# Patient Record
Sex: FEMALE | Race: WHITE | Employment: OTHER | URBAN - METROPOLITAN AREA
[De-identification: names, ages, dates, MRNs, and addresses within clinical notes are randomized per-mention and may not be internally consistent; named-entity substitution may affect disease eponyms.]

---

## 2017-01-25 ENCOUNTER — HOSPITAL ENCOUNTER (OUTPATIENT)
Dept: PREADMISSION TESTING | Age: 72
Discharge: HOME | End: 2017-01-25
Payer: SELF-PAY

## 2017-01-25 VITALS
OXYGEN SATURATION: 100 % | SYSTOLIC BLOOD PRESSURE: 153 MMHG | HEIGHT: 62 IN | TEMPERATURE: 97.6 F | BODY MASS INDEX: 24.66 KG/M2 | DIASTOLIC BLOOD PRESSURE: 83 MMHG | WEIGHT: 134 LBS

## 2017-01-25 LAB
ANION GAP BLD CALC-SCNC: 8 MMOL/L (ref 5–15)
BASOPHILS # BLD AUTO: 0 K/UL (ref 0–0.1)
BASOPHILS # BLD: 0 % (ref 0–1)
BUN SERPL-MCNC: 13 MG/DL (ref 6–20)
BUN/CREAT SERPL: 21 (ref 12–20)
CALCIUM SERPL-MCNC: 8.6 MG/DL (ref 8.5–10.1)
CHLORIDE SERPL-SCNC: 109 MMOL/L (ref 97–108)
CO2 SERPL-SCNC: 27 MMOL/L (ref 21–32)
CREAT SERPL-MCNC: 0.63 MG/DL (ref 0.55–1.02)
EOSINOPHIL # BLD: 0.1 K/UL (ref 0–0.4)
EOSINOPHIL NFR BLD: 2 % (ref 0–7)
ERYTHROCYTE [DISTWIDTH] IN BLOOD BY AUTOMATED COUNT: 12.7 % (ref 11.5–14.5)
GLUCOSE SERPL-MCNC: 83 MG/DL (ref 65–100)
HCT VFR BLD AUTO: 41.1 % (ref 35–47)
HGB BLD-MCNC: 13.7 G/DL (ref 11.5–16)
LYMPHOCYTES # BLD AUTO: 29 % (ref 12–49)
LYMPHOCYTES # BLD: 1.3 K/UL (ref 0.8–3.5)
MCH RBC QN AUTO: 31.1 PG (ref 26–34)
MCHC RBC AUTO-ENTMCNC: 33.3 G/DL (ref 30–36.5)
MCV RBC AUTO: 93.2 FL (ref 80–99)
MONOCYTES # BLD: 0.4 K/UL (ref 0–1)
MONOCYTES NFR BLD AUTO: 9 % (ref 5–13)
NEUTS SEG # BLD: 2.7 K/UL (ref 1.8–8)
NEUTS SEG NFR BLD AUTO: 60 % (ref 32–75)
PLATELET # BLD AUTO: 171 K/UL (ref 150–400)
POTASSIUM SERPL-SCNC: 4 MMOL/L (ref 3.5–5.1)
RBC # BLD AUTO: 4.41 M/UL (ref 3.8–5.2)
SODIUM SERPL-SCNC: 144 MMOL/L (ref 136–145)
WBC # BLD AUTO: 4.5 K/UL (ref 3.6–11)

## 2017-01-25 PROCEDURE — 36415 COLL VENOUS BLD VENIPUNCTURE: CPT | Performed by: SURGERY

## 2017-01-25 PROCEDURE — 80048 BASIC METABOLIC PNL TOTAL CA: CPT | Performed by: SURGERY

## 2017-01-25 PROCEDURE — 85025 COMPLETE CBC W/AUTO DIFF WBC: CPT | Performed by: SURGERY

## 2017-01-25 RX ORDER — BENZOCAINE .13; .15; .5; 2 G/100G; G/100G; G/100G; G/100G
1 GEL ORAL DAILY
COMMUNITY
End: 2020-06-09

## 2017-01-25 RX ORDER — PANTOPRAZOLE SODIUM 40 MG/1
40 TABLET, DELAYED RELEASE ORAL
COMMUNITY
End: 2017-06-21

## 2017-01-26 ENCOUNTER — ANESTHESIA EVENT (OUTPATIENT)
Dept: MEDSURG UNIT | Age: 72
End: 2017-01-26
Payer: SELF-PAY

## 2017-01-26 NOTE — H&P
Katja Garcia  : 1945  DOS: 2017  Chief Complaint: Consultation       Allergies: codeine, morphine, penicillin       Medications: Micardis, VESIcare       Medical History: Height: 5' 2\", Weight: 138 lbs    Pulmonary System: Negative  Cardiac System: Hypertension  Blood and Liver Systems: Negative  Neurologic and Endocrine Systems: Negative  GI,  and Reproductive Systems: Negative  Cancer: Negative     Surgical History: Face Lift       Family History: High Blood Pressure , Cancer       Social History: Smoking Status: Unknown if ever smoked            Mrs. Mckayla Landis is a 70-year-old female who is a previous cosmetic patient with non-surgical facial rejuvenation and injectable fillers, who also sees our Deonte Kim. She presents today for a cosmetic consultation with a desire to undergo surgical facial rejuvenation to address changes that have occurred to her face over time. She feels as though she looks more tired and aged, and is interested in a surgical correction to provide a more youthful refreshed appearance. She does have a history of a primary facelift over 20 years ago. Examination demonstrates faintly noticeable well-healed scars in the temporal hair, pre-auricular, and post-auricular and mastoid incision areas. She does not demonstrate any marked facial asymmetries, irregularities, or facial nerve dysfunction. I had a lengthy discussion with Cristal Peter about a facelift. Cristal Peter understands this is performed under a general anesthetic on an outpatient basis. I diagrammed on paper and patients face where the pre-temporal hairline, preauricular, postauricular, mastoid, and submental incisions are made.  We discussed subcutaneous dissection, permanent Mersilene sutures placed in the SMAS fascia to plicate and tighten the tissue layer, redraping the skin in a superior-lateral vector, trimming and excising the excess skin away, and closure of the wounds in layers with sutures and/or staples. A suction drain is left in place for couple of days and a compression garment is used with activity restrictions while healing which can extend beyond 4 weeks. There is no exercise for 4 weeks. The risks and complications include but are not limited to infection, pain, bleeding, hematoma's requiring re-operation, skin sensitivity changes, vascular compromise to tissues resulting in partial or complete loss of tissues, resultant open wounds, the need for long term wound care and dressing changes and scarring, irregularities and asymmetries requiring touch-up and revision surgery, an unacceptable cosmetic result, and other things including cardiac and pulmonary risks that include death. Additional risks include facial nerve injury which can be temporary or permanent, partial or complete, and require additional surgery for correction. Her questions were answered and pictures were taken. She will meet with Gadiel Moore to discuss the fees for a conservative subcutaneous secondary face lift that includes pre-temporal hairline, pre-auricular, post-auricular, mastoid, and submental incisions. She may also benefit from a limited amount of autologous fat grafting as an adjunct procedure with her facelift.

## 2017-01-27 ENCOUNTER — ANESTHESIA (OUTPATIENT)
Dept: MEDSURG UNIT | Age: 72
End: 2017-01-27
Payer: SELF-PAY

## 2017-01-27 ENCOUNTER — HOSPITAL ENCOUNTER (OUTPATIENT)
Age: 72
Setting detail: OUTPATIENT SURGERY
Discharge: HOME | End: 2017-01-27
Attending: SURGERY | Admitting: SURGERY
Payer: SELF-PAY

## 2017-01-27 VITALS
HEART RATE: 69 BPM | BODY MASS INDEX: 26.27 KG/M2 | SYSTOLIC BLOOD PRESSURE: 124 MMHG | WEIGHT: 133.82 LBS | RESPIRATION RATE: 11 BRPM | HEIGHT: 60 IN | OXYGEN SATURATION: 92 % | DIASTOLIC BLOOD PRESSURE: 66 MMHG | TEMPERATURE: 98.1 F

## 2017-01-27 PROBLEM — Z41.1 ENCOUNTER FOR COSMETIC SURGERY: Status: ACTIVE | Noted: 2017-01-27

## 2017-01-27 PROCEDURE — 74011000250 HC RX REV CODE- 250: Performed by: SURGERY

## 2017-01-27 PROCEDURE — 76210000047 HC AMBSU PH I REC 4 TO 4.5 HR: Performed by: SURGERY

## 2017-01-27 PROCEDURE — 77030011640 HC PAD GRND REM COVD -A: Performed by: SURGERY

## 2017-01-27 PROCEDURE — 77030008684 HC TU ET CUF COVD -B: Performed by: ANESTHESIOLOGY

## 2017-01-27 PROCEDURE — 77030018846 HC SOL IRR STRL H20 ICUM -A: Performed by: SURGERY

## 2017-01-27 PROCEDURE — 77030013079 HC BLNKT BAIR HGGR 3M -A: Performed by: ANESTHESIOLOGY

## 2017-01-27 PROCEDURE — 74011250636 HC RX REV CODE- 250/636: Performed by: ANESTHESIOLOGY

## 2017-01-27 PROCEDURE — 77030008467 HC STPLR SKN COVD -B: Performed by: SURGERY

## 2017-01-27 PROCEDURE — 77030012406 HC DRN WND PENRS BARD -A: Performed by: SURGERY

## 2017-01-27 PROCEDURE — 74011250636 HC RX REV CODE- 250/636

## 2017-01-27 PROCEDURE — 77030002986 HC SUT PROL J&J -A: Performed by: SURGERY

## 2017-01-27 PROCEDURE — 76060000067 HC AMB SURG ANES 3.5 TO 4 HR: Performed by: SURGERY

## 2017-01-27 PROCEDURE — 77030026438 HC STYL ET INTUB CARD -A: Performed by: ANESTHESIOLOGY

## 2017-01-27 PROCEDURE — 74011250637 HC RX REV CODE- 250/637: Performed by: ANESTHESIOLOGY

## 2017-01-27 PROCEDURE — 74011000250 HC RX REV CODE- 250

## 2017-01-27 PROCEDURE — 77030005538 HC CATH URETH FOL44 BARD -B: Performed by: SURGERY

## 2017-01-27 PROCEDURE — 77030002936 HC SUT MERS J&J -A: Performed by: SURGERY

## 2017-01-27 PROCEDURE — 77030019702 HC WRP THER MENM -C: Performed by: SURGERY

## 2017-01-27 PROCEDURE — 77030002916 HC SUT ETHLN J&J -A: Performed by: SURGERY

## 2017-01-27 PROCEDURE — 76030000007 HC AMB SURG OR TIME 3.5 TO 4: Performed by: SURGERY

## 2017-01-27 PROCEDURE — 74011000258 HC RX REV CODE- 258: Performed by: SURGERY

## 2017-01-27 PROCEDURE — 74011250636 HC RX REV CODE- 250/636: Performed by: SURGERY

## 2017-01-27 PROCEDURE — 77030020782 HC GWN BAIR PAWS FLX 3M -B

## 2017-01-27 PROCEDURE — 74011000250 HC RX REV CODE- 250: Performed by: ANESTHESIOLOGY

## 2017-01-27 PROCEDURE — 74011250637 HC RX REV CODE- 250/637: Performed by: SURGERY

## 2017-01-27 PROCEDURE — 77030013567 HC DRN WND RESERV BARD -A: Performed by: SURGERY

## 2017-01-27 PROCEDURE — 77030011264 HC ELECTRD BLD EXT COVD -A: Performed by: SURGERY

## 2017-01-27 PROCEDURE — 77030032490 HC SLV COMPR SCD KNE COVD -B: Performed by: SURGERY

## 2017-01-27 PROCEDURE — 77030031139 HC SUT VCRL2 J&J -A: Performed by: SURGERY

## 2017-01-27 RX ORDER — MIDAZOLAM HYDROCHLORIDE 1 MG/ML
0.5 INJECTION, SOLUTION INTRAMUSCULAR; INTRAVENOUS
Status: DISCONTINUED | OUTPATIENT
Start: 2017-01-27 | End: 2017-01-27 | Stop reason: HOSPADM

## 2017-01-27 RX ORDER — OXYMETAZOLINE HCL 0.05 %
2 SPRAY, NON-AEROSOL (ML) NASAL
Status: DISCONTINUED | OUTPATIENT
Start: 2017-01-27 | End: 2017-01-27 | Stop reason: HOSPADM

## 2017-01-27 RX ORDER — MIDAZOLAM HYDROCHLORIDE 1 MG/ML
1 INJECTION, SOLUTION INTRAMUSCULAR; INTRAVENOUS AS NEEDED
Status: DISCONTINUED | OUTPATIENT
Start: 2017-01-27 | End: 2017-01-27 | Stop reason: HOSPADM

## 2017-01-27 RX ORDER — FENTANYL CITRATE 50 UG/ML
25 INJECTION, SOLUTION INTRAMUSCULAR; INTRAVENOUS
Status: DISCONTINUED | OUTPATIENT
Start: 2017-01-27 | End: 2017-01-27 | Stop reason: HOSPADM

## 2017-01-27 RX ORDER — MIDAZOLAM HYDROCHLORIDE 1 MG/ML
INJECTION, SOLUTION INTRAMUSCULAR; INTRAVENOUS AS NEEDED
Status: DISCONTINUED | OUTPATIENT
Start: 2017-01-27 | End: 2017-01-27 | Stop reason: HOSPADM

## 2017-01-27 RX ORDER — SODIUM CHLORIDE 9 MG/ML
25 INJECTION, SOLUTION INTRAVENOUS CONTINUOUS
Status: DISCONTINUED | OUTPATIENT
Start: 2017-01-27 | End: 2017-01-27 | Stop reason: HOSPADM

## 2017-01-27 RX ORDER — SODIUM CHLORIDE, SODIUM LACTATE, POTASSIUM CHLORIDE, CALCIUM CHLORIDE 600; 310; 30; 20 MG/100ML; MG/100ML; MG/100ML; MG/100ML
INJECTION, SOLUTION INTRAVENOUS
Status: DISCONTINUED | OUTPATIENT
Start: 2017-01-27 | End: 2017-01-27 | Stop reason: HOSPADM

## 2017-01-27 RX ORDER — SODIUM CHLORIDE 0.9 % (FLUSH) 0.9 %
5-10 SYRINGE (ML) INJECTION EVERY 8 HOURS
Status: DISCONTINUED | OUTPATIENT
Start: 2017-01-27 | End: 2017-01-27 | Stop reason: HOSPADM

## 2017-01-27 RX ORDER — SODIUM CHLORIDE 0.9 % (FLUSH) 0.9 %
5-10 SYRINGE (ML) INJECTION AS NEEDED
Status: DISCONTINUED | OUTPATIENT
Start: 2017-01-27 | End: 2017-01-27 | Stop reason: HOSPADM

## 2017-01-27 RX ORDER — DIPHENHYDRAMINE HYDROCHLORIDE 50 MG/ML
12.5 INJECTION, SOLUTION INTRAMUSCULAR; INTRAVENOUS AS NEEDED
Status: DISCONTINUED | OUTPATIENT
Start: 2017-01-27 | End: 2017-01-27 | Stop reason: HOSPADM

## 2017-01-27 RX ORDER — FENTANYL CITRATE 50 UG/ML
50 INJECTION, SOLUTION INTRAMUSCULAR; INTRAVENOUS AS NEEDED
Status: DISCONTINUED | OUTPATIENT
Start: 2017-01-27 | End: 2017-01-27 | Stop reason: HOSPADM

## 2017-01-27 RX ORDER — MORPHINE SULFATE 10 MG/ML
2 INJECTION, SOLUTION INTRAMUSCULAR; INTRAVENOUS
Status: DISCONTINUED | OUTPATIENT
Start: 2017-01-27 | End: 2017-01-27 | Stop reason: HOSPADM

## 2017-01-27 RX ORDER — SODIUM CHLORIDE, SODIUM LACTATE, POTASSIUM CHLORIDE, CALCIUM CHLORIDE 600; 310; 30; 20 MG/100ML; MG/100ML; MG/100ML; MG/100ML
125 INJECTION, SOLUTION INTRAVENOUS CONTINUOUS
Status: DISCONTINUED | OUTPATIENT
Start: 2017-01-27 | End: 2017-01-27 | Stop reason: HOSPADM

## 2017-01-27 RX ORDER — CLINDAMYCIN PHOSPHATE 600 MG/50ML
600 INJECTION INTRAVENOUS ONCE
Status: COMPLETED | OUTPATIENT
Start: 2017-01-27 | End: 2017-01-27

## 2017-01-27 RX ORDER — ONDANSETRON 2 MG/ML
INJECTION INTRAMUSCULAR; INTRAVENOUS AS NEEDED
Status: DISCONTINUED | OUTPATIENT
Start: 2017-01-27 | End: 2017-01-27 | Stop reason: HOSPADM

## 2017-01-27 RX ORDER — FENTANYL CITRATE 50 UG/ML
INJECTION, SOLUTION INTRAMUSCULAR; INTRAVENOUS AS NEEDED
Status: DISCONTINUED | OUTPATIENT
Start: 2017-01-27 | End: 2017-01-27 | Stop reason: HOSPADM

## 2017-01-27 RX ORDER — HYDROMORPHONE HYDROCHLORIDE 2 MG/ML
INJECTION, SOLUTION INTRAMUSCULAR; INTRAVENOUS; SUBCUTANEOUS AS NEEDED
Status: DISCONTINUED | OUTPATIENT
Start: 2017-01-27 | End: 2017-01-27 | Stop reason: HOSPADM

## 2017-01-27 RX ORDER — DEXAMETHASONE SODIUM PHOSPHATE 4 MG/ML
INJECTION, SOLUTION INTRA-ARTICULAR; INTRALESIONAL; INTRAMUSCULAR; INTRAVENOUS; SOFT TISSUE AS NEEDED
Status: DISCONTINUED | OUTPATIENT
Start: 2017-01-27 | End: 2017-01-27 | Stop reason: HOSPADM

## 2017-01-27 RX ORDER — LIDOCAINE HYDROCHLORIDE 10 MG/ML
0.1 INJECTION, SOLUTION EPIDURAL; INFILTRATION; INTRACAUDAL; PERINEURAL AS NEEDED
Status: DISCONTINUED | OUTPATIENT
Start: 2017-01-27 | End: 2017-01-27 | Stop reason: HOSPADM

## 2017-01-27 RX ORDER — ROCURONIUM BROMIDE 10 MG/ML
INJECTION, SOLUTION INTRAVENOUS AS NEEDED
Status: DISCONTINUED | OUTPATIENT
Start: 2017-01-27 | End: 2017-01-27 | Stop reason: HOSPADM

## 2017-01-27 RX ORDER — SCOLOPAMINE TRANSDERMAL SYSTEM 1 MG/1
1.5 PATCH, EXTENDED RELEASE TRANSDERMAL ONCE
Status: DISCONTINUED | OUTPATIENT
Start: 2017-01-27 | End: 2017-01-27 | Stop reason: HOSPADM

## 2017-01-27 RX ORDER — OXYCODONE AND ACETAMINOPHEN 5; 325 MG/1; MG/1
1 TABLET ORAL AS NEEDED
Status: DISCONTINUED | OUTPATIENT
Start: 2017-01-27 | End: 2017-01-27 | Stop reason: HOSPADM

## 2017-01-27 RX ORDER — SODIUM CHLORIDE, SODIUM LACTATE, POTASSIUM CHLORIDE, CALCIUM CHLORIDE 600; 310; 30; 20 MG/100ML; MG/100ML; MG/100ML; MG/100ML
25 INJECTION, SOLUTION INTRAVENOUS CONTINUOUS
Status: DISCONTINUED | OUTPATIENT
Start: 2017-01-27 | End: 2017-01-27 | Stop reason: HOSPADM

## 2017-01-27 RX ORDER — GLYCOPYRROLATE 0.2 MG/ML
INJECTION INTRAMUSCULAR; INTRAVENOUS AS NEEDED
Status: DISCONTINUED | OUTPATIENT
Start: 2017-01-27 | End: 2017-01-27 | Stop reason: HOSPADM

## 2017-01-27 RX ORDER — OXYCODONE HYDROCHLORIDE 5 MG/1
5 TABLET ORAL AS NEEDED
Status: DISCONTINUED | OUTPATIENT
Start: 2017-01-27 | End: 2017-01-27 | Stop reason: HOSPADM

## 2017-01-27 RX ORDER — HYDROMORPHONE HYDROCHLORIDE 1 MG/ML
0.2 INJECTION, SOLUTION INTRAMUSCULAR; INTRAVENOUS; SUBCUTANEOUS
Status: DISCONTINUED | OUTPATIENT
Start: 2017-01-27 | End: 2017-01-27 | Stop reason: HOSPADM

## 2017-01-27 RX ORDER — ONDANSETRON 2 MG/ML
4 INJECTION INTRAMUSCULAR; INTRAVENOUS AS NEEDED
Status: DISCONTINUED | OUTPATIENT
Start: 2017-01-27 | End: 2017-01-27 | Stop reason: HOSPADM

## 2017-01-27 RX ORDER — ACETAMINOPHEN 325 MG/1
650 TABLET ORAL ONCE
Status: COMPLETED | OUTPATIENT
Start: 2017-01-27 | End: 2017-01-27

## 2017-01-27 RX ORDER — PROPOFOL 10 MG/ML
INJECTION, EMULSION INTRAVENOUS AS NEEDED
Status: DISCONTINUED | OUTPATIENT
Start: 2017-01-27 | End: 2017-01-27 | Stop reason: HOSPADM

## 2017-01-27 RX ORDER — LIDOCAINE HYDROCHLORIDE 20 MG/ML
INJECTION, SOLUTION EPIDURAL; INFILTRATION; INTRACAUDAL; PERINEURAL AS NEEDED
Status: DISCONTINUED | OUTPATIENT
Start: 2017-01-27 | End: 2017-01-27 | Stop reason: HOSPADM

## 2017-01-27 RX ORDER — SUCCINYLCHOLINE CHLORIDE 20 MG/ML
INJECTION INTRAMUSCULAR; INTRAVENOUS AS NEEDED
Status: DISCONTINUED | OUTPATIENT
Start: 2017-01-27 | End: 2017-01-27 | Stop reason: HOSPADM

## 2017-01-27 RX ADMIN — FENTANYL CITRATE 100 MCG: 50 INJECTION, SOLUTION INTRAMUSCULAR; INTRAVENOUS at 07:42

## 2017-01-27 RX ADMIN — HYDROMORPHONE HYDROCHLORIDE 0.2 MG: 2 INJECTION, SOLUTION INTRAMUSCULAR; INTRAVENOUS; SUBCUTANEOUS at 11:35

## 2017-01-27 RX ADMIN — GLYCOPYRROLATE 0.1 MG: 0.2 INJECTION INTRAMUSCULAR; INTRAVENOUS at 07:31

## 2017-01-27 RX ADMIN — PROCHLORPERAZINE EDISYLATE 5 MG: 5 INJECTION INTRAMUSCULAR; INTRAVENOUS at 12:28

## 2017-01-27 RX ADMIN — MIDAZOLAM HYDROCHLORIDE 0.5 MG: 1 INJECTION, SOLUTION INTRAMUSCULAR; INTRAVENOUS at 12:26

## 2017-01-27 RX ADMIN — FENTANYL CITRATE 100 MCG: 50 INJECTION, SOLUTION INTRAMUSCULAR; INTRAVENOUS at 07:45

## 2017-01-27 RX ADMIN — ROCURONIUM BROMIDE 25 MG: 10 INJECTION, SOLUTION INTRAVENOUS at 07:52

## 2017-01-27 RX ADMIN — OXYMETAZOLINE HYDROCHLORIDE 2 SPRAY: 5 SPRAY NASAL at 14:53

## 2017-01-27 RX ADMIN — ONDANSETRON 4 MG: 2 INJECTION INTRAMUSCULAR; INTRAVENOUS at 11:23

## 2017-01-27 RX ADMIN — MIDAZOLAM HYDROCHLORIDE 2 MG: 1 INJECTION, SOLUTION INTRAMUSCULAR; INTRAVENOUS at 07:31

## 2017-01-27 RX ADMIN — ACETAMINOPHEN 650 MG: 325 TABLET, FILM COATED ORAL at 07:23

## 2017-01-27 RX ADMIN — SUCCINYLCHOLINE CHLORIDE 140 MG: 20 INJECTION INTRAMUSCULAR; INTRAVENOUS at 07:42

## 2017-01-27 RX ADMIN — ROCURONIUM BROMIDE 5 MG: 10 INJECTION, SOLUTION INTRAVENOUS at 07:42

## 2017-01-27 RX ADMIN — HYDROMORPHONE HYDROCHLORIDE 0.6 MG: 2 INJECTION, SOLUTION INTRAMUSCULAR; INTRAVENOUS; SUBCUTANEOUS at 07:48

## 2017-01-27 RX ADMIN — CLINDAMYCIN PHOSPHATE 600 MG: 12 INJECTION, SOLUTION INTRAVENOUS at 08:00

## 2017-01-27 RX ADMIN — DEXAMETHASONE SODIUM PHOSPHATE 8 MG: 4 INJECTION, SOLUTION INTRA-ARTICULAR; INTRALESIONAL; INTRAMUSCULAR; INTRAVENOUS; SOFT TISSUE at 08:10

## 2017-01-27 RX ADMIN — FENTANYL CITRATE 50 MCG: 50 INJECTION, SOLUTION INTRAMUSCULAR; INTRAVENOUS at 07:31

## 2017-01-27 RX ADMIN — SODIUM CHLORIDE, SODIUM LACTATE, POTASSIUM CHLORIDE, CALCIUM CHLORIDE: 600; 310; 30; 20 INJECTION, SOLUTION INTRAVENOUS at 07:31

## 2017-01-27 RX ADMIN — ONDANSETRON 4 MG: 2 INJECTION INTRAMUSCULAR; INTRAVENOUS at 12:24

## 2017-01-27 RX ADMIN — SODIUM CHLORIDE, SODIUM LACTATE, POTASSIUM CHLORIDE, CALCIUM CHLORIDE: 600; 310; 30; 20 INJECTION, SOLUTION INTRAVENOUS at 11:23

## 2017-01-27 RX ADMIN — PROPOFOL 150 MG: 10 INJECTION, EMULSION INTRAVENOUS at 07:42

## 2017-01-27 RX ADMIN — BUPIVACAINE HYDROCHLORIDE AND EPINEPHRINE: 2.5; 5 INJECTION, SOLUTION EPIDURAL; INFILTRATION; INTRACAUDAL; PERINEURAL at 07:00

## 2017-01-27 RX ADMIN — SODIUM CHLORIDE, SODIUM LACTATE, POTASSIUM CHLORIDE, AND CALCIUM CHLORIDE 125 ML/HR: 600; 310; 30; 20 INJECTION, SOLUTION INTRAVENOUS at 06:45

## 2017-01-27 RX ADMIN — LIDOCAINE HYDROCHLORIDE 60 MG: 20 INJECTION, SOLUTION EPIDURAL; INFILTRATION; INTRACAUDAL; PERINEURAL at 07:42

## 2017-01-27 NOTE — INTERVAL H&P NOTE
H&P Update:  Ad Quintana was seen and examined. History and physical has been reviewed. The patient has been examined.  There have been no significant clinical changes since the completion of the originally dated History and Physical.    Signed By: Sarah Henning MD     January 27, 2017 6:12 AM

## 2017-01-27 NOTE — ANESTHESIA POSTPROCEDURE EVALUATION
Post-Anesthesia Evaluation and Assessment    Patient: Sandy Collins MRN: [id number]  SSN: [id number]    YOB: 1945  Age: 70 y.o. Sex: female       Cardiovascular Function/Vital Signs  Visit Vitals    /47    Pulse (!) 106    Temp 36.7 °C (98.1 °F)    Resp 13    Ht 5' (1.524 m)    Wt 60.7 kg (133 lb 13.1 oz)    SpO2 97%    BMI 26.13 kg/m2       Patient is status post general anesthesia for Procedure(s):  FACELIFT. Nausea/Vomiting: None    Postoperative hydration reviewed and adequate. Pain:  Pain Scale 1: Numeric (0 - 10) (01/27/17 0700)  Pain Intensity 1: 0 (01/27/17 0700)   Managed    Neurological Status:   Neuro (WDL): Within Defined Limits (01/27/17 0731)   At baseline    Mental Status and Level of Consciousness: Arousable    Pulmonary Status:   O2 Device: Nasal cannula (01/27/17 1141)   Adequate oxygenation and airway patent    Complications related to anesthesia: None    Post-anesthesia assessment completed.  No concerns    Signed By: Yoshi Alvarez MD     January 27, 2017

## 2017-01-27 NOTE — PERIOP NOTES
Patient discharged from Phase 1 of care. Dr. López Martinez instilled a second dose of eye drops and ointment after patient complained of more discomfort. Patient cleared for discharge as per MD. Received ointment and eye drops for home use. Patient had no other complaints. Friend received a copy of discharge instructions, drain care sheet and measuring container.

## 2017-01-27 NOTE — OP NOTES
[address] [facility] [place] [address], [address]   OP NOTE       Name:  Kendra Smith   MR#:  [id number]   :  1945   Account #:  [de-identified]    Surgery Date:  2017   Date of Adm:  2017       PREOPERATIVE DIAGNOSIS: Desires facial rejuvenation. POSTOPERATIVE DIAGNOSIS: Desires facial rejuvenation. PROCEDURES PERFORMED: Subcutaneous secondary facelift   with superficial musculoaponeurotic system plication. SURGEON: Arnulfo Vicente MD    ANESTHESIA: General.    INDICATIONS FOR PROCEDURE: The patient is a pleasant 67-year-  old female who was seen in the plastic surgery clinic for an evaluation   regarding changes that have occurred to her face over time and   environmental factors. She was an otherwise healthy woman, very   active, also was seeing a  for skin care. She also   has a history of undergoing a primary elective cosmetic facelift   approximately 20 years ago. On exam, she had the usual changes   associated with time and well-healed scars, with a slightly posteriorly   displaced hairline in the temporal region secondary to her first   procedure. She was felt to be an acceptable candidate for conservative   secondary subcutaneous facelift, and comes in today for that   procedure. OPERATIVE PROCEDURE: After appropriate consent was obtained,   preop markers were placed. She was taken to the operating room,   placed on the operating table in supine position. Satisfactory general   endotracheal anesthesia was obtained. SCD devices were placed per   routine. A Stanford catheter was also placed to help facilitate fluid   management. The table was rotated 180 degrees for exposure, and   her face and neck were sterilely prepped and draped. All skin incisions,   to include submental, pretemporal hairline, preauricular, postauricular   and mastoid incisions were made with a #10 blade.  In the submental   space, the subcutaneous dissection was performed to expose the   undersurface of the platysmal muscle and the medial borders. Any   superficial fat on the surface of the platysma was removed with a   spatula, liposuction cannula and wall suction. Once we identified the   medial borders, they were plicated with a 3-0 Mersilene suture in   buried interrupted fashion from the mentum to approximately the level   of the hyoid bone. Each hemifacial dissection from the pretemporal   hairline and preauricular incisions were then dissected in a medial   direction towards a curvilinear vector drawn from the lateral canthus of   the eye to the lateral commissure of the mouth, with care taken to   performed with no dissection medial to that line. The dissection of the   submental space from the left neck to the right neck was also   communicated through the submental space. The SMAS fascia was then   plicated with 3-0 Mersilenes, also in a direct vertical vector medially,   and as we extended towards the root of the helix in a more oblique   orientation in an interrupted buried fashion. The plication was also   carried in front of the ear, and down around the lobule, and then back   over the sternocleidomastoid, plicating the sternocleidomastoid fascia   with the platysma fascia. Once we were satisfied, any hemostasis was   achieved with electrocautery. The wounds were irrigated with sterile   saline. The skin was then conservatively pulled in a superolateral   vector, trimmed and cut away, and the wounds were closed in layers,   including 3-0 Vicryl and 6-0 nylon in the pretemporal hairline and   preauricular wounds, 3-0 Vicryl in the postauricular wound, 3-0 Vicryl   and staples over the mastoid, and 3-0 Vicryl and 6-0 nylon in the   submental wound. Sterile dressings, including Xeroform gauze, 4 x 4   gauze and Kerlix rolls were placed. At the end of the procedure,   sponge and needle counts were reported as correct.  Estimated blood   loss was approximately 50 mL.    ESTIMATED BLOOD LOSS: 50 mL. INTRAVENOUS FLUIDS: 1300 mL. SPECIMENS REMOVED: None. COMPLICATIONS: None. She was awakened, extubated and transferred to the recovery room in   satisfactory and stable condition. She will be discharged home today in   care of her family or friends with prescriptions and instructions, and will   follow up with me within 1 day for a postop check and dressing   change.         Brandie Royal MD      [facility]   D:  01/27/2017   11:40   T:  01/27/2017   12:36   Job #:  [id number]

## 2017-01-27 NOTE — ROUTINE PROCESS
Patient: Lory Celis MRN: [id number]  SSN: [id number]   YOB: 1945  Age: 70 y.o. Sex: female     Patient is status post Procedure(s):  FACELIFT. Surgeon(s) and Role:     * Janes Velez MD - Primary    Local/Dose/Irrigation:  See [facility]                  Peripheral IV 01/27/17 Left Hand (Active)   Site Assessment Clean, dry, & intact 1/27/2017  7:00 AM   Dressing Status Occlusive 1/27/2017  7:00 AM          Murali-Sarah Drain 01/27/17 Anterior Neck (Active)   Site Assessment Clean, dry, & intact 1/27/2017  8:00 AM   Dressing Status Clean, dry, & intact 1/27/2017  8:00 AM   Status Patent; Charged;Draining 1/27/2017  8:00 AM   Drainage Color Serosanguinous 1/27/2017  8:00 AM      Airway - Endotracheal Tube 01/27/17 Oral (Active)                   Dressing/Packing:  Wound Face Anterior; Other (comment)-DRESSING TYPE: 4 x 4;Gauze wrap (dinah); Xeroform (01/27/17 0700)  Splint/Cast:  ]    Other:

## 2017-01-27 NOTE — DISCHARGE INSTRUCTIONS
Leave the surgical dressing ON and DRY until follow up appointment tomorrow  Resume any and all pre op medications and diet  Keep head elevated at night and at all times at least 35 degrees, do not lay flat  Walk every 1-2 hours during the day in house for 5-10- minutes  NO strenuous activity or exercise for 4 weeks  Use stool softeners to prevent constipation  Do not take pain medications on an empty stomach  JOAQUINA drain (1) measure and record daily output, recharge as needed, strip tube 2-3 times per day  Call DR Yuly Zuniga on his cell phone at [phone] for questions and to arrange follow up time tomorrow morning for dressing change at his office. Anticipate a phone call tonight between 8 and 9 PM      DISCHARGE SUMMARY from Nurse    The following personal items are in your possession at time of discharge:    Dental Appliances: None              Clothing:  (clothes in bag)                PATIENT INSTRUCTIONS:    After general anesthesia or intravenous sedation, for 24 hours or while taking prescription Narcotics:  · Limit your activities  · Do not drive and operate hazardous machinery  · Do not make important personal or business decisions  · Do  not drink alcoholic beverages  · If you have not urinated within 8 hours after discharge, please contact your surgeon on call. Report the following to your surgeon:  · Excessive pain, swelling, redness or odor of or around the surgical area  · Temperature over 100.5  · Nausea and vomiting lasting longer than 4 hours or if unable to take medications  · Any signs of decreased circulation or nerve impairment to extremity: change in color, persistent  numbness, tingling, coldness or increase pain  · Any questions        What to do at Home:  Recommended activity: Activity as tolerated, as stated above    If you experience any of the following symptoms as stated above, please follow up with Dr. Yuly Zuniga.       *  Please give a list of your current medications to your Primary Care Provider. *  Please update this list whenever your medications are discontinued, doses are      changed, or new medications (including over-the-counter products) are added. *  Please carry medication information at all times in case of emergency situations. These are general instructions for a healthy lifestyle:    No smoking/ No tobacco products/ Avoid exposure to second hand smoke    Surgeon General's Warning:  Quitting smoking now greatly reduces serious risk to your health. Obesity, smoking, and sedentary lifestyle greatly increases your risk for illness    A healthy diet, regular physical exercise & weight monitoring are important for maintaining a healthy lifestyle    You may be retaining fluid if you have a history of heart failure or if you experience any of the following symptoms:  Weight gain of 3 pounds or more overnight or 5 pounds in a week, increased swelling in our hands or feet or shortness of breath while lying flat in bed. Please call your doctor as soon as you notice any of these symptoms; do not wait until your next office visit. Recognize signs and symptoms of STROKE:    F-face looks uneven    A-arms unable to move or move unevenly    S-speech slurred or non-existent    T-time-call 911 as soon as signs and symptoms begin-DO NOT go       Back to bed or wait to see if you get better-TIME IS BRAIN. Warning Signs of HEART ATTACK     Call 911 if you have these symptoms:   Chest discomfort. Most heart attacks involve discomfort in the center of the chest that lasts more than a few minutes, or that goes away and comes back. It can feel like uncomfortable pressure, squeezing, fullness, or pain.  Discomfort in other areas of the upper body. Symptoms can include pain or discomfort in one or both arms, the back, neck, jaw, or stomach.  Shortness of breath with or without chest discomfort.    Other signs may include breaking out in a cold sweat, nausea, or lightheadedness. Don't wait more than five minutes to call 911 - MINUTES MATTER! Fast action can save your life. Calling 911 is almost always the fastest way to get lifesaving treatment. Emergency Medical Services staff can begin treatment when they arrive -- up to an hour sooner than if someone gets to the hospital by car. The discharge information has been reviewed with the patient, caregiver. The patient and caregiver verbalized understanding. Discharge medications reviewed with the patient and caregiver and appropriate educational materials and side effects teaching were provided.

## 2017-01-27 NOTE — BRIEF OP NOTE
BRIEF OPERATIVE NOTE    Date of Procedure: 1/27/2017   Preoperative Diagnosis: COSMETIC  Postoperative Diagnosis: COSMETIC    Procedure(s):  FACELIFT  Surgeon(s) and Role:     * Randell Dalton MD - Primary            Surgical Staff:  Circ-1: Liz Dunbar RN  Circ-Relief: Chris Salcido RN  Registered Nurse Assistant: Sanjeev Boyle RN  Scrub Tech-1: Luigi   Surg Asst-Relief: Thad Acuna RN  Event Time In   Incision Start 0820   Incision Close 1112     Anesthesia: General   Estimated Blood Loss: 50  Specimens: * No specimens in log *   Findings: thin skin and subcutaneous tissues   Complications: none  Implants: * No implants in log *

## 2017-01-27 NOTE — ANESTHESIA PREPROCEDURE EVALUATION
Anesthetic History   No history of anesthetic complications            Review of Systems / Medical History  Patient summary reviewed, nursing notes reviewed and pertinent labs reviewed    Pulmonary  Within defined limits                 Neuro/Psych         TIA     Cardiovascular  Within defined limits                Exercise tolerance: >4 METS     GI/Hepatic/Renal     GERD: well controlled           Endo/Other        Arthritis     Other Findings              Physical Exam    Airway  Mallampati: II  TM Distance: > 6 cm  Neck ROM: normal range of motion   Mouth opening: Normal     Cardiovascular  Regular rate and rhythm,  S1 and S2 normal,  no murmur, click, rub, or gallop             Dental    Dentition: Caps/crowns     Pulmonary  Breath sounds clear to auscultation               Abdominal  GI exam deferred       Other Findings            Anesthetic Plan    ASA: 2  Anesthesia type: general          Induction: Intravenous  Anesthetic plan and risks discussed with: Patient

## 2017-01-27 NOTE — PERIOP NOTES
Patient complaining of right eye discomfort. Dr. Shirin Brice informed and has given orders for medications to instill into eye.

## 2017-01-27 NOTE — IP AVS SNAPSHOT
[address] [facility] [address] [place] [address] 
[phone] Patient: Patrick Cooper MRN: [id number] :1945 You are allergic to the following Allergen Reactions Codeine Nausea and Vomiting Morphine Hives Pcn (Penicillins) Rash Shellfish Derived Hives Angioedema Recent Documentation Height Weight BMI OB Status Smoking Status 1.524 m 60.7 kg 26.13 kg/m2 Postmenopausal Never Smoker Emergency Contacts Name Discharge Info Relation Home Work Mobile Jorge Shaw DISCHARGE CAREGIVER [3] Child [2] [phone] About your hospitalization You were admitted on:  2017 You last received care in the:  [facility] PACU You were discharged on:  2017 Unit phone number:  [phone] Why you were hospitalized Your primary diagnosis was:  Encounter For Cosmetic Surgery Providers Seen During Your Hospitalizations Provider Role Specialty Primary office phone Wilmer Parsons MD Attending Provider Plastic Surgery [phone] Your Primary Care Physician (PCP) Primary Care Physician Office Phone Office Fax [place] [phone] Follow-up Information Follow up With Details Comments Contact Info Wilmer Parsons MD In 1 day  [place] [address] 
[phone] Olivia Barakat MD   [address] [facility] [address] [place] [address] 
[phone] Current Discharge Medication List  
  
CONTINUE these medications which have NOT CHANGED Dose & Instructions Dispensing Information Comments Morning Noon Evening Bedtime  
 budesonide 32 mcg/actuation nasal spray Commonly known as:  RHINOCORT AQUA Your next dose is: Today, Tomorrow Other:  _________ Dose:  1 Spray 1 [place] by Both Nostrils route daily. Refills:  0 MYRBETRIQ 50 mg ER tablet Generic drug:  mirabegron ER Your next dose is: Today, Tomorrow Other:  _________ Dose:  50 mg Take 50 mg by mouth daily. Refills:  0 PROTONIX 40 mg tablet Generic drug:  pantoprazole Your next dose is: Today, Tomorrow Other:  _________ Dose:  40 mg Take 40 mg by mouth. TAKES ABOUT EVERY 3 DAYS Refills:  0 Discharge Instructions Leave the surgical dressing ON and DRY until follow up appointment tomorrow Resume any and all pre op medications and diet Keep head elevated at night and at all times at least 35 degrees, do not lay flat Walk every 1-2 hours during the day in house for 5-1- minutes NO strenuous activity or exercise for 4 weeks Use stool softeners to prevent constipation Do not take pain medications on an empty stomach JOAQUINA drain (1) measure and record daily output, recharge as needed, strip tube 2-3 times per day Call DR Phylicia Bobo on his cell phone at [phone] for questions and to arrange follow up time tomorrow morning for dressing change at his office. Anticipate a phone call tonight between 8 and 9 PM 
 
 
DISCHARGE SUMMARY from Nurse The following personal items are in your possession at time of discharge: 
 
Dental Appliances: None Clothing:  (clothes in bag) PATIENT INSTRUCTIONS: 
 
 
F-face looks uneven A-arms unable to move or move unevenly S-speech slurred or non-existent T-time-call 911 as soon as signs and symptoms begin-DO NOT go  
 Back to bed or wait to see if you get better-TIME IS BRAIN. Warning Signs of HEART ATTACK Call 911 if you have these symptoms: 
? Chest discomfort. Most heart attacks involve discomfort in the center of the chest that lasts more than a few minutes, or that goes away and comes back. It can feel like uncomfortable pressure, squeezing, fullness, or pain. ? Discomfort in other areas of the upper body. Symptoms can include pain or discomfort in one or both arms, the back, neck, jaw, or stomach. ? Shortness of breath with or without chest discomfort. ? Other signs may include breaking out in a cold sweat, nausea, or lightheadedness. Don't wait more than five minutes to call [address]! Fast action can save your life. Calling 911 is almost always the fastest way to get lifesaving treatment. Emergency Medical Services staff can begin treatment when they arrive  up to an hour sooner than if someone gets to the hospital by car. The discharge information has been reviewed with the patient, caregiver. The patient and caregiver verbalized understanding. Discharge medications reviewed with the patient and caregiver and appropriate educational materials and side effects teaching were provided. Discharge Orders None Introducing [facility]! Ian Ventura introduces [employer] patient portal. Now you can access parts of your medical record, email your doctor's office, and request medication refills online. 1. In your internet browser, go to [email or web address] 2. Click on the First Time User? Click Here link in the Sign In box. You will see the New Member Sign Up page. 3. Enter your [employer] Access Code exactly as it appears below. You will not need to use this code after youve completed the sign-up process. If you do not sign up before the expiration date, you must request a new code.  
 
· [employer] Access Code: [email or web address] 
 Expires: 4/24/2017 12:39 PM 
 
4. Enter the last four digits of your Social Security Number (xxxx) and Date of Birth (mm/dd/yyyy) as indicated and click Submit. You will be taken to the next sign-up page. 5. Create a [employer] ID. This will be your [employer] login ID and cannot be changed, so think of one that is secure and easy to remember. 6. Create a [employer] password. You can change your password at any time. 7. Enter your Password Reset Question and Answer. This can be used at a later time if you forget your password. 8. Enter your e-mail address. You will receive e-mail notification when new information is available in [address] 9. Click Sign Up. You can now view and download portions of your medical record. 10. Click the Download Summary menu link to download a portable copy of your medical information. If you have questions, please visit the Frequently Asked Questions section of the [employer] website. Remember, [employer] is NOT to be used for urgent needs. For medical emergencies, dial 911. Now available from your iPhone and Android! General Information Please provide this summary of care documentation to your next provider. Patient Signature:  ____________________________________________________________ Date:  ____________________________________________________________  
  
[facility] Provider Signature:  ____________________________________________________________ Date:  ____________________________________________________________

## 2017-06-20 ENCOUNTER — CLINICAL SUPPORT (OUTPATIENT)
Dept: CARDIOLOGY CLINIC | Age: 72
End: 2017-06-20

## 2017-06-20 ENCOUNTER — OFFICE VISIT (OUTPATIENT)
Dept: CARDIOLOGY CLINIC | Age: 72
End: 2017-06-20

## 2017-06-20 VITALS
OXYGEN SATURATION: 100 % | WEIGHT: 137.4 LBS | DIASTOLIC BLOOD PRESSURE: 70 MMHG | SYSTOLIC BLOOD PRESSURE: 120 MMHG | BODY MASS INDEX: 26.97 KG/M2 | HEIGHT: 60 IN | HEART RATE: 67 BPM | RESPIRATION RATE: 16 BRPM

## 2017-06-20 DIAGNOSIS — R06.02 SOB (SHORTNESS OF BREATH): Primary | ICD-10-CM

## 2017-06-20 DIAGNOSIS — E78.5 DYSLIPIDEMIA: ICD-10-CM

## 2017-06-20 DIAGNOSIS — R06.02 SHORTNESS OF BREATH: ICD-10-CM

## 2017-06-20 DIAGNOSIS — G45.9 TRANSIENT CEREBRAL ISCHEMIA, UNSPECIFIED TYPE: ICD-10-CM

## 2017-06-20 DIAGNOSIS — R07.9 CHEST PAIN, UNSPECIFIED TYPE: Primary | ICD-10-CM

## 2017-06-20 RX ORDER — CLOPIDOGREL BISULFATE 75 MG/1
TABLET ORAL
Refills: 3 | COMMUNITY
Start: 2017-05-07 | End: 2018-12-13

## 2017-06-20 RX ORDER — ROSUVASTATIN CALCIUM 10 MG/1
10 TABLET, COATED ORAL
Qty: 90 TAB | Refills: 3 | Status: ON HOLD | OUTPATIENT
Start: 2017-06-20 | End: 2017-07-05

## 2017-06-20 NOTE — PROGRESS NOTES
Explained procedure to patient, monitoring EKG/HR/BP, walking on treadmill, and risks/discomforts. All concerns and questions addressed. Consent obtained. Pt complained of chest pain 5/10 at peak exercise which subsided in recovery. See scanned report.  ordered and Dr. Eleanor Blankenship read study. ID verified per protocol. Pt  reported no symptoms at completion of protocol.

## 2017-06-20 NOTE — PROGRESS NOTES
Mike Larios MD. [facility] [place]              Patient: Comfort Child  : 1945      Today's Date: 2017      Referred by Adina Hanna (NP)      HISTORY OF PRESENT ILLNESS:     History of Present Illness:  Ms. Shalonda Ledbetter is referred here for chest pain. She has been having deep chest pain on left side - comes on with activity - better with rest.  Worse with a deep breath but also when twisting her body and also when she presses the area. No SOB. She walks/exercises without problems. She denies a history of heart problems. Did have a TIA last year thought to be from hormone therapy. Prior records obtained from PCP and chart updated. PAST MEDICAL HISTORY:     Past Medical History:   Diagnosis Date    Arthritis     Diverticulitis     HAS HAD NO EPISODES SINCE COLOSTOMY REVERSAL    GERD (gastroesophageal reflux disease)     Hypertension     not currently on meds for HTN, was in past     Stroke [facility])     TIA AUG. 2016 possible association to prempro. Started on Plavix afterwards. Past Surgical History:   Procedure Laterality Date    HX COLONOSCOPY      HX DILATION AND CURETTAGE      MISCARRIAGE IN HER 20s    HX GI  ? colostomy for diverticultis and then a reversal    HX GYN      tubes tied    HX HEENT      right eye cataract removed    HX HEENT      tonsillectomy    HX OTHER SURGICAL      cyst removed from wrist             MEDICATIONS:     Current Outpatient Prescriptions   Medication Sig Dispense Refill    clopidogrel (PLAVIX) 75 mg tab TAKE 1 TABLET BY MOUTH ONCE DAILY  3    pantoprazole (PROTONIX) 40 mg tablet Take 40 mg by mouth. TAKES ABOUT EVERY 3 DAYS      budesonide (RHINOCORT AQUA) 32 mcg/actuation nasal spray 1 [place] by Both Nostrils route daily.            Allergies   Allergen Reactions    Codeine Nausea and Vomiting    Morphine Hives    Pcn [Penicillins] Rash    Shellfish Derived Hives and Angioedema             SOCIAL HISTORY: Social History   Substance Use Topics    Smoking status: Never Smoker    Smokeless tobacco: Never Used    Alcohol use 1.8 oz/week     3 Glasses of wine per week      Comment: Occ           FAMILY HISTORY:     Family History   Problem Relation Age of Onset    Cancer Mother      lymphoma    Cancer Father      bladder    Hypertension Father     Hypertension Paternal Uncle     Hypertension Paternal Grandfather     Anesth Problems Neg Hx              REVIEW OF SYMPTOMS:     Review of Symptoms:  Constitutional: Negative for fever, chills  HEENT: Negative for nosebleeds, tinnitus, and vision changes. Respiratory: Negative for cough, wheezing  Cardiovascular: Negative for orthopnea, claudication, syncope, and PND. Gastrointestinal: Negative for abdominal pain, diarrhea, melena. Genitourinary: Negative for dysuria  Musculoskeletal: Negative for myalgias. Skin: Negative for rash  Heme: No problems bleeding. Neurological: Negative for speech change and focal weakness. PHYSICAL EXAM:     Physical Exam:  Visit Vitals    /70    Pulse 67    Resp 16    Ht 5' (1.524 m)    Wt 137 lb 6.4 oz (62.3 kg)    SpO2 100%    BMI 26.83 kg/m2     Patient appears generally well, mood and affect are appropriate and pleasant. HEENT:  Hearing intact, non-icteric, normocephalic, atraumatic. Neck Exam: Supple, No JVD or carotid bruits. Lung Exam: Clear to auscultation, even breath sounds. Cardiac Exam: Regular rate and rhythm with no murmur. + chest wall tenderness on palpation. Abdomen: Soft, non-tender, normal bowel sounds. No bruits or masses. Extremities: Moves all ext well. No lower extremity edema. Vascular: 2+ dorsalis pedis pulses bilaterally.   Psych: Appropriate affect  Neuro - Grossly intact          LABS / OTHER STUDIES:     Lab Results   Component Value Date/Time    Sodium 144 01/25/2017 03:57 PM    Potassium 4.0 01/25/2017 03:57 PM    Chloride 109 01/25/2017 03:57 PM    CO2 27 01/25/2017 03:57 PM    Anion gap 8 01/25/2017 03:57 PM    Glucose 83 01/25/2017 03:57 PM    BUN 13 01/25/2017 03:57 PM    Creatinine 0.63 01/25/2017 03:57 PM    BUN/Creatinine ratio 21 01/25/2017 03:57 PM    GFR est AA >60 01/25/2017 03:57 PM    GFR est non-AA >60 01/25/2017 03:57 PM    Calcium 8.6 01/25/2017 03:57 PM    Bilirubin, total 0.3 09/17/2015 09:15 PM    AST (SGOT) 17 09/17/2015 09:15 PM    Alk. phosphatase 84 09/17/2015 09:15 PM    Protein, total 6.9 09/17/2015 09:15 PM    Albumin 3.8 09/17/2015 09:15 PM    Globulin 3.1 09/17/2015 09:15 PM    A-G Ratio 1.2 09/17/2015 09:15 PM    ALT (SGPT) 28 09/17/2015 09:15 PM     Lab Results   Component Value Date/Time    WBC 4.5 01/25/2017 03:57 PM    HGB 13.7 01/25/2017 03:57 PM    HCT 41.1 01/25/2017 03:57 PM    PLATELET 941 33/04/3537 03:57 PM    MCV 93.2 01/25/2017 03:57 PM     Labs 6/1/17 - chol 219, TG 56, HDL 69, , CMP OK, CBC OK, D-dimer 0.49, BNP 19, Trop < 0.01,         CARDIAC DIAGNOSTICS:     Cardiac Evaluation Includes:    EKG 6/20/17 - NSR, normal     Treadmill Stress test 6/20/17 - walked 6:31 (8.5 METS), normal stress test         ASSESSMENT AND PLAN:     Assessment and Plan:  1) Chest pain   - She has been having deep chest pain on left side. Worse with a deep breath but also when twisting her body and also when she presses the area. Area is tender on palpation.   - Since she also said she had pain with activity, we proceeded with a treadmill stress test today (6/20/17) which was normal.   - Her symptoms are probably msktl in nature    2) TIA at [facility] in 2016  - Apparently this was though to be related to hormone therapy   - Will try and get prior records to review. She also said she had [facility] screening (echo, carotids) last year and I asked her to drop off copy of results for us. - She is on plavix since her TIA.   I think some anti-platelet is reasonable (ASA or plavix) for secondary prevention.   - Given her increased CV risk (see below) and prior TIA, I think a statin is reasonable for further stroke/TIA prevention and she agreed to start one (see below). 3) CV risk assessment   - Her 10 years ASCVD risk is 9.3%  - I discussed options (no change in treatment, CT heart scan, statin) and she agrees to start a statin  - Will start crestor 10 mg daily (reviewed risks) and repeat labs in 3 months (can be done with PCP)      4) See me back in one year. Patient expressed understanding of the plan - questions were answered. She has business selling peanuts. Vandana Anton MD, [address], [address] [place] [address]  Ph: [phone]   Ph [phone]      .

## 2017-06-20 NOTE — PROGRESS NOTES
Chief Complaint   Patient presents with    New Patient     Referred by Dr. Bere Lewis    Chest Pain     c/o chest pain upon excertion or period of feeling tired    Shortness of Breath   \  Visit Vitals    /70    Pulse 67    Resp 16    Ht 5' (1.524 m)    Wt 137 lb 6.4 oz (62.3 kg)    SpO2 100%    BMI 26.83 kg/m2

## 2017-06-21 ENCOUNTER — HOSPITAL ENCOUNTER (OUTPATIENT)
Dept: PREADMISSION TESTING | Age: 72
Discharge: HOME | End: 2017-06-21
Payer: SELF-PAY

## 2017-06-21 VITALS
BODY MASS INDEX: 24.84 KG/M2 | OXYGEN SATURATION: 99 % | TEMPERATURE: 98 F | SYSTOLIC BLOOD PRESSURE: 151 MMHG | WEIGHT: 135 LBS | HEIGHT: 62 IN | DIASTOLIC BLOOD PRESSURE: 76 MMHG

## 2017-06-21 LAB
ANION GAP BLD CALC-SCNC: 7 MMOL/L (ref 5–15)
BASOPHILS # BLD AUTO: 0 K/UL (ref 0–0.1)
BASOPHILS # BLD: 0 % (ref 0–1)
BUN SERPL-MCNC: 18 MG/DL (ref 6–20)
BUN/CREAT SERPL: 24 (ref 12–20)
CALCIUM SERPL-MCNC: 8.9 MG/DL (ref 8.5–10.1)
CHLORIDE SERPL-SCNC: 106 MMOL/L (ref 97–108)
CO2 SERPL-SCNC: 27 MMOL/L (ref 21–32)
CREAT SERPL-MCNC: 0.75 MG/DL (ref 0.55–1.02)
EOSINOPHIL # BLD: 0.1 K/UL (ref 0–0.4)
EOSINOPHIL NFR BLD: 2 % (ref 0–7)
ERYTHROCYTE [DISTWIDTH] IN BLOOD BY AUTOMATED COUNT: 13.1 % (ref 11.5–14.5)
GLUCOSE SERPL-MCNC: 93 MG/DL (ref 65–100)
HCT VFR BLD AUTO: 43.3 % (ref 35–47)
HGB BLD-MCNC: 14.4 G/DL (ref 11.5–16)
LYMPHOCYTES # BLD AUTO: 29 % (ref 12–49)
LYMPHOCYTES # BLD: 1.2 K/UL (ref 0.8–3.5)
MCH RBC QN AUTO: 30.8 PG (ref 26–34)
MCHC RBC AUTO-ENTMCNC: 33.3 G/DL (ref 30–36.5)
MCV RBC AUTO: 92.7 FL (ref 80–99)
MONOCYTES # BLD: 0.4 K/UL (ref 0–1)
MONOCYTES NFR BLD AUTO: 10 % (ref 5–13)
NEUTS SEG # BLD: 2.4 K/UL (ref 1.8–8)
NEUTS SEG NFR BLD AUTO: 59 % (ref 32–75)
PLATELET # BLD AUTO: 170 K/UL (ref 150–400)
POTASSIUM SERPL-SCNC: 4.2 MMOL/L (ref 3.5–5.1)
RBC # BLD AUTO: 4.67 M/UL (ref 3.8–5.2)
SODIUM SERPL-SCNC: 140 MMOL/L (ref 136–145)
WBC # BLD AUTO: 4 K/UL (ref 3.6–11)

## 2017-06-21 PROCEDURE — 85025 COMPLETE CBC W/AUTO DIFF WBC: CPT | Performed by: SURGERY

## 2017-06-21 PROCEDURE — 36415 COLL VENOUS BLD VENIPUNCTURE: CPT | Performed by: SURGERY

## 2017-06-21 PROCEDURE — 80048 BASIC METABOLIC PNL TOTAL CA: CPT | Performed by: SURGERY

## 2017-06-21 RX ORDER — OMEPRAZOLE 40 MG/1
40 CAPSULE, DELAYED RELEASE ORAL DAILY
COMMUNITY
End: 2018-12-13

## 2017-07-03 NOTE — H&P
Danna Garcia  : 1945  DOS: 2017  Chief Complaint: Consultation       Allergies: codeine, morphine, penicillin       Medications: Demerol HCl 50 mg oral tablet, Micardis, VESIcare       Medical History: Height: 5' 2\", Weight: 138 lbs    Pulmonary System: Negative  Cardiac System: Hypertension  Blood and Liver Systems: Negative  Neurologic and Endocrine Systems: TIA  GI,  and Reproductive Systems: Negative  Cancer: Negative     Surgical History: Facelift       Family History: High Blood Pressure , Cancer       Social History: Smoking Status: Unknown if ever smoked     Mrs. Cory Vicente is now at least 1 year or more postop from her facial rejuvenation surgery, and presents today with a desire to discuss liposuction to address areas of unwanted fat on her abdominal wall. She has undergone SculpSure treatments ×2 on the abdomen but did not appreciate the results she was hoping for. She is interested in addressing unwanted fat on her abdominal wall but is not interested in the abdominoplasty procedure, with the large incision and the longer recovery phase. She was hoping to obtain the best results possible through liposuction only. She also has a large vertical scar on her lower abdomen from a previous laparotomy and temporary colostomy from diverticular disease that she was interested in having improved through scar revision procedure. Examination demonstrates age-appropriate skin tone and a moderate degree of subcutaneous lipodystrophy in the subcostal upper abdomen and in the infraumbilical abdomen. She also has a moderate degree of lipodystrophy in the posterior flank or love handle region. She has a long vertical scar from the umbilicus and a periumbilical scar that has reduced the size of the umbilicus. In addition, there is another left sided paramedian scar where the actual temporary colostomy came through the abdominal wall.     I had a lengthy discussion with Nica Carbone regarding body contouring with liposuction. Tiffani Millard understands this is performed under a general anesthetic and in most cases as an outpatient. We also discussed the tumescent technique and the differences between standard suction-assisted and ultrasonic-assisted liposuction. Patient understands there is an access incision made, tumescent fluid is injected into the areas of fat to be treated, ultrasonic technology is applied first with our [employer] 3000 generator, standard suction cannulas are then used to aspirate the emulsified liquefied fat, and the access incision is closed with a dissolvable suture, and compression garments with TopiFoam are placed and used for at least 6 weeks with associated activity restrictions. There is no exercise for 4 weeks. The risks and complications include but are not limited to infection, pain, bleeding, hematoma's requiring re-operation, skin sensitivity changes, vascular compromise to tissues resulting in partial or complete loss of tissues, resultant open wounds, the need for long term wound care and dressing changes and scarring, irregularities and asymmetries requiring touch-up and revision surgery, an unacceptable cosmetic result, and other things including cardiac and pulmonary risks that include death. To address her scars, they would be directly excised and the excision wound would be closed in layers with sutures. A compression garment would be used with associated activity restrictions while she is healing which can be extended beyond 6 weeks. There is no exercise for 4 weeks.  The risks and complications include but are not limited to infection, pain, bleeding, hematomas requiring re-operation, skin sensitivity changes, vascular compromise to tissues resulting in partial or complete loss of tissues, resultant open wounds, the need for long term wound care and dressing changes and scarring, irregularities and asymmetries requiring touch-up and revision surgery, an unacceptable cosmetic result, and other things including cardiac and pulmonary risks that include death. Her questions were answered, pictures are taken, and she will meet with Chris to discuss the fees for liposuction to her flank and abdomen with an associated scar revision procedure.

## 2017-07-04 ENCOUNTER — ANESTHESIA EVENT (OUTPATIENT)
Dept: MEDSURG UNIT | Age: 72
End: 2017-07-04
Payer: SELF-PAY

## 2017-07-05 ENCOUNTER — HOSPITAL ENCOUNTER (OUTPATIENT)
Age: 72
Setting detail: OUTPATIENT SURGERY
Discharge: HOME | End: 2017-07-05
Attending: SURGERY | Admitting: SURGERY
Payer: SELF-PAY

## 2017-07-05 ENCOUNTER — ANESTHESIA (OUTPATIENT)
Dept: MEDSURG UNIT | Age: 72
End: 2017-07-05
Payer: SELF-PAY

## 2017-07-05 VITALS
WEIGHT: 135 LBS | TEMPERATURE: 98.7 F | DIASTOLIC BLOOD PRESSURE: 75 MMHG | OXYGEN SATURATION: 95 % | HEIGHT: 62 IN | HEART RATE: 83 BPM | BODY MASS INDEX: 24.84 KG/M2 | SYSTOLIC BLOOD PRESSURE: 121 MMHG | RESPIRATION RATE: 16 BRPM

## 2017-07-05 PROCEDURE — 76210000037 HC AMBSU PH I REC 2 TO 2.5 HR: Performed by: SURGERY

## 2017-07-05 PROCEDURE — 77030008467 HC STPLR SKN COVD -B: Performed by: SURGERY

## 2017-07-05 PROCEDURE — 77030019908 HC STETH ESOPH SIMS -A: Performed by: ANESTHESIOLOGY

## 2017-07-05 PROCEDURE — 76210000046 HC AMBSU PH II REC FIRST 0.5 HR: Performed by: SURGERY

## 2017-07-05 PROCEDURE — 74011250636 HC RX REV CODE- 250/636: Performed by: SURGERY

## 2017-07-05 PROCEDURE — 77030032490 HC SLV COMPR SCD KNE COVD -B: Performed by: SURGERY

## 2017-07-05 PROCEDURE — 77030026438 HC STYL ET INTUB CARD -A: Performed by: ANESTHESIOLOGY

## 2017-07-05 PROCEDURE — 74011250637 HC RX REV CODE- 250/637: Performed by: ANESTHESIOLOGY

## 2017-07-05 PROCEDURE — 77030019702 HC WRP THER MENM -C: Performed by: SURGERY

## 2017-07-05 PROCEDURE — 77030008538 HC TBNG LIPO SUC WELL -B: Performed by: SURGERY

## 2017-07-05 PROCEDURE — 77030031139 HC SUT VCRL2 J&J -A: Performed by: SURGERY

## 2017-07-05 PROCEDURE — 77030002933 HC SUT MCRYL J&J -A: Performed by: SURGERY

## 2017-07-05 PROCEDURE — 77030008534 HC TBNG LIPOSUC BYRO -B: Performed by: SURGERY

## 2017-07-05 PROCEDURE — 74011250636 HC RX REV CODE- 250/636

## 2017-07-05 PROCEDURE — 77030034850: Performed by: SURGERY

## 2017-07-05 PROCEDURE — 76030000005 HC AMB SURG OR TIME 2.5 TO 3: Performed by: SURGERY

## 2017-07-05 PROCEDURE — 77030018846 HC SOL IRR STRL H20 ICUM -A: Performed by: SURGERY

## 2017-07-05 PROCEDURE — 77030018836 HC SOL IRR NACL ICUM -A: Performed by: SURGERY

## 2017-07-05 PROCEDURE — 77030011640 HC PAD GRND REM COVD -A: Performed by: SURGERY

## 2017-07-05 PROCEDURE — 77030020782 HC GWN BAIR PAWS FLX 3M -B

## 2017-07-05 PROCEDURE — 77030008684 HC TU ET CUF COVD -B: Performed by: ANESTHESIOLOGY

## 2017-07-05 PROCEDURE — 74011000250 HC RX REV CODE- 250: Performed by: SURGERY

## 2017-07-05 PROCEDURE — 74011000250 HC RX REV CODE- 250

## 2017-07-05 PROCEDURE — 74011250636 HC RX REV CODE- 250/636: Performed by: ANESTHESIOLOGY

## 2017-07-05 PROCEDURE — 76060000065 HC AMB SURG ANES 2.5 TO 3 HR: Performed by: SURGERY

## 2017-07-05 PROCEDURE — 77030013079 HC BLNKT BAIR HGGR 3M -A: Performed by: ANESTHESIOLOGY

## 2017-07-05 RX ORDER — FENTANYL CITRATE 50 UG/ML
INJECTION, SOLUTION INTRAMUSCULAR; INTRAVENOUS AS NEEDED
Status: DISCONTINUED | OUTPATIENT
Start: 2017-07-05 | End: 2017-07-05 | Stop reason: HOSPADM

## 2017-07-05 RX ORDER — LIDOCAINE HYDROCHLORIDE 20 MG/ML
INJECTION, SOLUTION EPIDURAL; INFILTRATION; INTRACAUDAL; PERINEURAL AS NEEDED
Status: DISCONTINUED | OUTPATIENT
Start: 2017-07-05 | End: 2017-07-05 | Stop reason: HOSPADM

## 2017-07-05 RX ORDER — SUCCINYLCHOLINE CHLORIDE 20 MG/ML
INJECTION INTRAMUSCULAR; INTRAVENOUS AS NEEDED
Status: DISCONTINUED | OUTPATIENT
Start: 2017-07-05 | End: 2017-07-05 | Stop reason: HOSPADM

## 2017-07-05 RX ORDER — FENTANYL CITRATE 50 UG/ML
50 INJECTION, SOLUTION INTRAMUSCULAR; INTRAVENOUS AS NEEDED
Status: DISCONTINUED | OUTPATIENT
Start: 2017-07-05 | End: 2017-07-05 | Stop reason: HOSPADM

## 2017-07-05 RX ORDER — SODIUM CHLORIDE, SODIUM LACTATE, POTASSIUM CHLORIDE, CALCIUM CHLORIDE 600; 310; 30; 20 MG/100ML; MG/100ML; MG/100ML; MG/100ML
INJECTION, SOLUTION INTRAVENOUS
Status: DISCONTINUED | OUTPATIENT
Start: 2017-07-05 | End: 2017-07-05 | Stop reason: HOSPADM

## 2017-07-05 RX ORDER — SODIUM CHLORIDE 0.9 % (FLUSH) 0.9 %
5-10 SYRINGE (ML) INJECTION AS NEEDED
Status: DISCONTINUED | OUTPATIENT
Start: 2017-07-05 | End: 2017-07-05 | Stop reason: HOSPADM

## 2017-07-05 RX ORDER — HYDROMORPHONE HYDROCHLORIDE 1 MG/ML
INJECTION, SOLUTION INTRAMUSCULAR; INTRAVENOUS; SUBCUTANEOUS AS NEEDED
Status: DISCONTINUED | OUTPATIENT
Start: 2017-07-05 | End: 2017-07-05 | Stop reason: HOSPADM

## 2017-07-05 RX ORDER — PROPOFOL 10 MG/ML
INJECTION, EMULSION INTRAVENOUS AS NEEDED
Status: DISCONTINUED | OUTPATIENT
Start: 2017-07-05 | End: 2017-07-05 | Stop reason: HOSPADM

## 2017-07-05 RX ORDER — FENTANYL CITRATE 50 UG/ML
25 INJECTION, SOLUTION INTRAMUSCULAR; INTRAVENOUS
Status: DISCONTINUED | OUTPATIENT
Start: 2017-07-05 | End: 2017-07-05 | Stop reason: HOSPADM

## 2017-07-05 RX ORDER — DEXAMETHASONE SODIUM PHOSPHATE 4 MG/ML
INJECTION, SOLUTION INTRA-ARTICULAR; INTRALESIONAL; INTRAMUSCULAR; INTRAVENOUS; SOFT TISSUE AS NEEDED
Status: DISCONTINUED | OUTPATIENT
Start: 2017-07-05 | End: 2017-07-05 | Stop reason: HOSPADM

## 2017-07-05 RX ORDER — DIPHENHYDRAMINE HYDROCHLORIDE 50 MG/ML
12.5 INJECTION, SOLUTION INTRAMUSCULAR; INTRAVENOUS AS NEEDED
Status: DISCONTINUED | OUTPATIENT
Start: 2017-07-05 | End: 2017-07-05 | Stop reason: HOSPADM

## 2017-07-05 RX ORDER — KETAMINE HYDROCHLORIDE 10 MG/ML
INJECTION, SOLUTION INTRAMUSCULAR; INTRAVENOUS AS NEEDED
Status: DISCONTINUED | OUTPATIENT
Start: 2017-07-05 | End: 2017-07-05 | Stop reason: HOSPADM

## 2017-07-05 RX ORDER — DEXTROSE, SODIUM CHLORIDE, SODIUM LACTATE, POTASSIUM CHLORIDE, AND CALCIUM CHLORIDE 5; .6; .31; .03; .02 G/100ML; G/100ML; G/100ML; G/100ML; G/100ML
125 INJECTION, SOLUTION INTRAVENOUS CONTINUOUS
Status: DISCONTINUED | OUTPATIENT
Start: 2017-07-05 | End: 2017-07-05 | Stop reason: HOSPADM

## 2017-07-05 RX ORDER — SODIUM CHLORIDE, SODIUM LACTATE, POTASSIUM CHLORIDE, CALCIUM CHLORIDE 600; 310; 30; 20 MG/100ML; MG/100ML; MG/100ML; MG/100ML
125 INJECTION, SOLUTION INTRAVENOUS CONTINUOUS
Status: DISCONTINUED | OUTPATIENT
Start: 2017-07-05 | End: 2017-07-05 | Stop reason: HOSPADM

## 2017-07-05 RX ORDER — ROCURONIUM BROMIDE 10 MG/ML
INJECTION, SOLUTION INTRAVENOUS AS NEEDED
Status: DISCONTINUED | OUTPATIENT
Start: 2017-07-05 | End: 2017-07-05 | Stop reason: HOSPADM

## 2017-07-05 RX ORDER — HYDROMORPHONE HYDROCHLORIDE 1 MG/ML
0.2 INJECTION, SOLUTION INTRAMUSCULAR; INTRAVENOUS; SUBCUTANEOUS
Status: DISCONTINUED | OUTPATIENT
Start: 2017-07-05 | End: 2017-07-05 | Stop reason: HOSPADM

## 2017-07-05 RX ORDER — CLINDAMYCIN PHOSPHATE 900 MG/50ML
900 INJECTION INTRAVENOUS ONCE
Status: COMPLETED | OUTPATIENT
Start: 2017-07-05 | End: 2017-07-05

## 2017-07-05 RX ORDER — MIDAZOLAM HYDROCHLORIDE 1 MG/ML
INJECTION, SOLUTION INTRAMUSCULAR; INTRAVENOUS AS NEEDED
Status: DISCONTINUED | OUTPATIENT
Start: 2017-07-05 | End: 2017-07-05 | Stop reason: HOSPADM

## 2017-07-05 RX ORDER — ONDANSETRON 2 MG/ML
4 INJECTION INTRAMUSCULAR; INTRAVENOUS AS NEEDED
Status: DISCONTINUED | OUTPATIENT
Start: 2017-07-05 | End: 2017-07-05 | Stop reason: HOSPADM

## 2017-07-05 RX ORDER — OXYCODONE HYDROCHLORIDE 5 MG/1
5 TABLET ORAL AS NEEDED
Status: DISCONTINUED | OUTPATIENT
Start: 2017-07-05 | End: 2017-07-05 | Stop reason: HOSPADM

## 2017-07-05 RX ORDER — ONDANSETRON 2 MG/ML
INJECTION INTRAMUSCULAR; INTRAVENOUS AS NEEDED
Status: DISCONTINUED | OUTPATIENT
Start: 2017-07-05 | End: 2017-07-05 | Stop reason: HOSPADM

## 2017-07-05 RX ORDER — MIDAZOLAM HYDROCHLORIDE 1 MG/ML
1 INJECTION, SOLUTION INTRAMUSCULAR; INTRAVENOUS AS NEEDED
Status: DISCONTINUED | OUTPATIENT
Start: 2017-07-05 | End: 2017-07-05 | Stop reason: HOSPADM

## 2017-07-05 RX ORDER — SODIUM CHLORIDE 0.9 % (FLUSH) 0.9 %
5-10 SYRINGE (ML) INJECTION EVERY 8 HOURS
Status: DISCONTINUED | OUTPATIENT
Start: 2017-07-05 | End: 2017-07-05 | Stop reason: HOSPADM

## 2017-07-05 RX ORDER — SCOLOPAMINE TRANSDERMAL SYSTEM 1 MG/1
1.5 PATCH, EXTENDED RELEASE TRANSDERMAL ONCE
Status: DISCONTINUED | OUTPATIENT
Start: 2017-07-05 | End: 2017-07-05 | Stop reason: HOSPADM

## 2017-07-05 RX ORDER — MIDAZOLAM HYDROCHLORIDE 1 MG/ML
1 INJECTION, SOLUTION INTRAMUSCULAR; INTRAVENOUS
Status: DISCONTINUED | OUTPATIENT
Start: 2017-07-05 | End: 2017-07-05 | Stop reason: HOSPADM

## 2017-07-05 RX ORDER — LIDOCAINE HYDROCHLORIDE 10 MG/ML
0.5 INJECTION, SOLUTION EPIDURAL; INFILTRATION; INTRACAUDAL; PERINEURAL AS NEEDED
Status: DISCONTINUED | OUTPATIENT
Start: 2017-07-05 | End: 2017-07-05 | Stop reason: HOSPADM

## 2017-07-05 RX ORDER — CEFAZOLIN SODIUM IN 0.9 % NACL 2 G/100 ML
PLASTIC BAG, INJECTION (ML) INTRAVENOUS AS NEEDED
Status: DISCONTINUED | OUTPATIENT
Start: 2017-07-05 | End: 2017-07-05

## 2017-07-05 RX ADMIN — MIDAZOLAM HYDROCHLORIDE 2 MG: 1 INJECTION, SOLUTION INTRAMUSCULAR; INTRAVENOUS at 08:52

## 2017-07-05 RX ADMIN — PROPOFOL 200 MG: 10 INJECTION, EMULSION INTRAVENOUS at 08:57

## 2017-07-05 RX ADMIN — FENTANYL CITRATE 100 MCG: 50 INJECTION, SOLUTION INTRAMUSCULAR; INTRAVENOUS at 08:57

## 2017-07-05 RX ADMIN — HYDROMORPHONE HYDROCHLORIDE 0.1 MG: 1 INJECTION, SOLUTION INTRAMUSCULAR; INTRAVENOUS; SUBCUTANEOUS at 10:33

## 2017-07-05 RX ADMIN — LIDOCAINE HYDROCHLORIDE 50 MG: 20 INJECTION, SOLUTION EPIDURAL; INFILTRATION; INTRACAUDAL; PERINEURAL at 08:57

## 2017-07-05 RX ADMIN — MIDAZOLAM HYDROCHLORIDE 3 MG: 1 INJECTION, SOLUTION INTRAMUSCULAR; INTRAVENOUS at 08:49

## 2017-07-05 RX ADMIN — ROCURONIUM BROMIDE 10 MG: 10 INJECTION, SOLUTION INTRAVENOUS at 08:57

## 2017-07-05 RX ADMIN — SODIUM CHLORIDE, SODIUM LACTATE, POTASSIUM CHLORIDE, AND CALCIUM CHLORIDE 125 ML/HR: 600; 310; 30; 20 INJECTION, SOLUTION INTRAVENOUS at 08:05

## 2017-07-05 RX ADMIN — CLINDAMYCIN PHOSPHATE 600 MG: 18 INJECTION, SOLUTION INTRAVENOUS at 09:04

## 2017-07-05 RX ADMIN — SUCCINYLCHOLINE CHLORIDE 140 MG: 20 INJECTION INTRAMUSCULAR; INTRAVENOUS at 08:57

## 2017-07-05 RX ADMIN — DEXAMETHASONE SODIUM PHOSPHATE 8 MG: 4 INJECTION, SOLUTION INTRA-ARTICULAR; INTRALESIONAL; INTRAMUSCULAR; INTRAVENOUS; SOFT TISSUE at 09:12

## 2017-07-05 RX ADMIN — KETAMINE HYDROCHLORIDE 25 MG: 10 INJECTION, SOLUTION INTRAMUSCULAR; INTRAVENOUS at 09:04

## 2017-07-05 RX ADMIN — HYDROMORPHONE HYDROCHLORIDE 0.2 MG: 1 INJECTION, SOLUTION INTRAMUSCULAR; INTRAVENOUS; SUBCUTANEOUS at 10:42

## 2017-07-05 RX ADMIN — ONDANSETRON 4 MG: 2 INJECTION INTRAMUSCULAR; INTRAVENOUS at 09:12

## 2017-07-05 RX ADMIN — SODIUM CHLORIDE, SODIUM LACTATE, POTASSIUM CHLORIDE, CALCIUM CHLORIDE: 600; 310; 30; 20 INJECTION, SOLUTION INTRAVENOUS at 08:48

## 2017-07-05 NOTE — PROGRESS NOTES
Pt came into PACU at 11:35 unable to get a valid temp with thermometer. Used a skin dot to obtain temp of 94.0. Hooked pt up to bear hugger and made charge nurse aware. Dr. Diana Collins in to see the patient. Temp continue to increase as noted in the chart.

## 2017-07-05 NOTE — DISCHARGE INSTRUCTIONS
Leave surgical garment and dressing ON for 48 hours. Expect and anticipate bloody watery drainage from liposuction incisions for about 2-3 days so use plastic trash bags and towels to protect furniture and bedding. Resume any and all pre op medications and diet but use sport drinks like gator aid or power aid instead of water for 3 days and extra protein in diet helps wounds heal  Walk every 1-2 hours during the day in house for 5-10 minutes for first week  Keep head elevated at night about 35 degrees  Use stool softeners to prevent constipation  When you shower, remove the garment and pay attention to the white foam under the garment, save it, shower, dry skin and replace the foam as you found it and use every day for about 10-14 days then discard. Place the second clean garment on and use for 6 weeks. You may launder garments in between use. Call Dr. Jed Ramos at [phone] for follow up appointment in two weeks      DISCHARGE SUMMARY from Nurse    The following personal items are in your possession at time of discharge:    Dental Appliances: None              Clothing:  (clothes in locker)                PATIENT INSTRUCTIONS:    After general anesthesia or intravenous sedation, for 24 hours or while taking prescription Narcotics:  · Limit your activities  · Do not drive and operate hazardous machinery  · Do not make important personal or business decisions  · Do  not drink alcoholic beverages  · If you have not urinated within 8 hours after discharge, please contact your surgeon on call.     Report the following to your surgeon:  · Excessive pain, swelling, redness or odor of or around the surgical area  · Temperature over 100.5  · Nausea and vomiting lasting longer than 4 hours or if unable to take medications  · Any signs of decreased circulation or nerve impairment to extremity: change in color, persistent  numbness, tingling, coldness or increase pain  · Any questions        What to do at Home:  Recommended activity: See surgical instructions. If you experience any of the following symptoms as noted above, please follow up with Dr. Adelina Segal. *  Please give a list of your current medications to your Primary Care Provider. *  Please update this list whenever your medications are discontinued, doses are      changed, or new medications (including over-the-counter products) are added. *  Please carry medication information at all times in case of emergency situations. These are general instructions for a healthy lifestyle:    No smoking/ No tobacco products/ Avoid exposure to second hand smoke    Surgeon General's Warning:  Quitting smoking now greatly reduces serious risk to your health. Obesity, smoking, and sedentary lifestyle greatly increases your risk for illness    A healthy diet, regular physical exercise & weight monitoring are important for maintaining a healthy lifestyle    You may be retaining fluid if you have a history of heart failure or if you experience any of the following symptoms:  Weight gain of 3 pounds or more overnight or 5 pounds in a week, increased swelling in our hands or feet or shortness of breath while lying flat in bed. Please call your doctor as soon as you notice any of these symptoms; do not wait until your next office visit. Recognize signs and symptoms of STROKE:    F-face looks uneven    A-arms unable to move or move unevenly    S-speech slurred or non-existent    T-time-call 911 as soon as signs and symptoms begin-DO NOT go       Back to bed or wait to see if you get better-TIME IS BRAIN. Warning Signs of HEART ATTACK     Call 911 if you have these symptoms:   Chest discomfort. Most heart attacks involve discomfort in the center of the chest that lasts more than a few minutes, or that goes away and comes back. It can feel like uncomfortable pressure, squeezing, fullness, or pain.  Discomfort in other areas of the upper body.  Symptoms can include pain or discomfort in one or both arms, the back, neck, jaw, or stomach.  Shortness of breath with or without chest discomfort.  Other signs may include breaking out in a cold sweat, nausea, or lightheadedness. Don't wait more than five minutes to call 911 - MINUTES MATTER! Fast action can save your life. Calling 911 is almost always the fastest way to get lifesaving treatment. Emergency Medical Services staff can begin treatment when they arrive -- up to an hour sooner than if someone gets to the hospital by car. The discharge information has been reviewed with the patient and caregiver. The patient and caregiver verbalized understanding. Discharge medications reviewed with the patient and caregiver and appropriate educational materials and side effects teaching were provided.

## 2017-07-05 NOTE — PERIOP NOTES
Discharge instructions reviewed with patient and caregiver. Opportunity for questions and clarification provided. Emphasis placed on post-operative pain management, and prevention of post-operative nausea by ensuring patient has eaten prior to taking narcotic pain medication. Patient and caregiver verbalized understanding of instructions and had no additional questions or concerns.

## 2017-07-05 NOTE — ANESTHESIA POSTPROCEDURE EVALUATION
Post-Anesthesia Evaluation and Assessment    Patient: Brantley Dancer MRN: [id number]  SSN: [id number]    YOB: 1945  Age: 70 y.o. Sex: female       Cardiovascular Function/Vital Signs  Visit Vitals    /75    Pulse 78    Temp (!) 34.4 °C (94 °F)    Resp 12    Ht 5' 2.25\" (1.581 m)    Wt 61.2 kg (135 lb)    SpO2 97%    BMI 24.49 kg/m2       Patient is status post general anesthesia for Procedure(s):  LIPOSUCTION TWO SITES ABDOMEN AND BILATERAL FLANKS, SCAR REVISION ABDOMEN  SCAR REVISION TRUNK. Nausea/Vomiting: None    Postoperative hydration reviewed and adequate. Pain:  Pain Scale 1: Numeric (0 - 10) (07/05/17 0754)  Pain Intensity 1: 0 (07/05/17 0754)   Managed    Neurological Status:   Neuro (WDL): Within Defined Limits (tia) (07/05/17 0824)   At baseline    Mental Status and Level of Consciousness: Arousable    Pulmonary Status:   O2 Device: Nasal cannula (07/05/17 1141)   Adequate oxygenation and airway patent    Complications related to anesthesia: None    Post-anesthesia assessment completed.  No concerns    Signed By: Aleyda Arreola MD     July 5, 2017

## 2017-07-05 NOTE — IP AVS SNAPSHOT
[address] [facility] [address] [place] [address] 
[phone] Patient: Donny Real MRN: [id number] :1945 You are allergic to the following Allergen Reactions Codeine Nausea and Vomiting Morphine Hives Pcn (Penicillins) Rash Shellfish Derived Hives Angioedema Topical iodine ok on skin Recent Documentation Height Weight BMI OB Status Smoking Status 1.581 m 61.2 kg 24.49 kg/m2 Postmenopausal Never Smoker Emergency Contacts Name Discharge Info Relation Home Work Mobile Raine Casey CAREGIVER [3] Child [2] [phone] About your hospitalization You were admitted on:  2017 You last received care in the:  [facility] ASU PACU You were discharged on:  2017 Unit phone number:  [phone] Why you were hospitalized Your primary diagnosis was:  Not on File Your diagnoses also included:  Encounter For Cosmetic Surgery Providers Seen During Your Hospitalizations Provider Role Specialty Primary office phone Montez Walsh MD Attending Provider Plastic Surgery [phone] Your Primary Care Physician (PCP) Primary Care Physician Office Phone Office Fax [facility]er  [phone] Follow-up Information Follow up With Details Comments Contact Info Montez Walsh MD In 2 weeks  [facility] [address] 
[phone] Timothy Shin MD   [place] [address] [address] [facility] 
[phone] Current Discharge Medication List  
  
CONTINUE these medications which have NOT CHANGED Dose & Instructions Dispensing Information Comments Morning Noon Evening Bedtime  
 budesonide 32 mcg/actuation nasal spray Commonly known as:  RHINOCORT AQUA Your last dose was: Your next dose is: Dose:  1 Spray 1 Oral by Both Nostrils route daily. Refills:  0  
     
   
   
   
  
 clopidogrel 75 mg Tab Commonly known as:  PLAVIX Your last dose was: Your next dose is: TAKE 1 TABLET BY MOUTH ONCE DAILY Refills:  3 MULTIVITAMIN PO Your last dose was: Your next dose is: Take  by mouth daily. Refills:  0 PriLOSEC 40 mg capsule Generic drug:  omeprazole Your last dose was: Your next dose is:    
   
   
 Dose:  40 mg Take 40 mg by mouth daily. Refills:  0 PROBIOTIC PO Your last dose was: Your next dose is: Take  by mouth daily. Refills:  0  
     
   
   
   
  
 VITAMIN B-12 PO Your last dose was: Your next dose is: Take  by mouth daily. Refills:  0 Discharge Instructions Leave surgical garment and dressing ON for 48 hours. Expect and anticipate bloody watery drainage from liposuction incisions for about 2-3 days so use plastic trash bags and towels to protect furniture and bedding. Resume any and all pre op medications and diet but use sport drinks like gator aid or power aid instead of water for 3 days and extra protein in diet helps wounds heal 
Walk every 1-2 hours during the day in house for 5-10 minutes for first week Keep head elevated at night about 35 degrees Use stool softeners to prevent constipation When you shower, remove the garment and pay attention to the white foam under the garment, save it, shower, dry skin and replace the foam as you found it and use every day for about 10-14 days then discard. Place the second clean garment on and use for 6 weeks. You may launder garments in between use. Call Dr. Gildardo Green at [phone] for follow up appointment in two weeks DISCHARGE SUMMARY from Nurse The following personal items are in your possession at time of discharge: 
 
Dental Appliances: None Clothing:  (clothes in locker) PATIENT INSTRUCTIONS: 
 
 
F-face looks uneven A-arms unable to move or move unevenly S-speech slurred or non-existent T-time-call 911 as soon as signs and symptoms begin-DO NOT go Back to bed or wait to see if you get better-TIME IS BRAIN. Warning Signs of HEART ATTACK Call 911 if you have these symptoms: 
? Chest discomfort. Most heart attacks involve discomfort in the center of the chest that lasts more than a few minutes, or that goes away and comes back. It can feel like uncomfortable pressure, squeezing, fullness, or pain. ? Discomfort in other areas of the upper body. Symptoms can include pain or discomfort in one or both arms, the back, neck, jaw, or stomach. ? Shortness of breath with or without chest discomfort. ? Other signs may include breaking out in a cold sweat, nausea, or lightheadedness. Don't wait more than five minutes to call [address]! Fast action can save your life. Calling 911 is almost always the fastest way to get lifesaving treatment. Emergency Medical Services staff can begin treatment when they arrive  up to an hour sooner than if someone gets to the hospital by car. The discharge information has been reviewed with the patient and caregiver. The patient and caregiver verbalized understanding. Discharge medications reviewed with the patient and caregiver and appropriate educational materials and side effects teaching were provided. Discharge Orders None Introducing [address]! [facility] introduces 9facts patient portal. Now you can access parts of your medical record, email your doctor's office, and request medication refills online. 1. In your internet browser, go to [email or web address] 2. Click on the First Time User? Click Here link in the Sign In box. You will see the New Member Sign Up page. 3. Enter your 9facts Access Code exactly as it appears below. You will not need to use this code after youve completed the sign-up process. If you do not sign up before the expiration date, you must request a new code. · 9facts Access Code: [email or web address] Expires: 9/18/2017 11:02 AM 
 
4. Enter the last four digits of your Social Security Number (xxxx) and Date of Birth (mm/dd/yyyy) as indicated and click Submit. You will be taken to the next sign-up page. 5. Create a 9facts ID. This will be your 9facts login ID and cannot be changed, so think of one that is secure and easy to remember. 6. Create a 9facts password. You can change your password at any time. 7. Enter your Password Reset Question and Answer. This can be used at a later time if you forget your password. 8. Enter your e-mail address. You will receive e-mail notification when new information is available in [address] 9. Click Sign Up. You can now view and download portions of your medical record. 10. Click the Download Summary menu link to download a portable copy of your medical information. If you have questions, please visit the Frequently Asked Questions section of the 9facts website. Remember, 9facts is NOT to be used for urgent needs. For medical emergencies, dial 911. Now available from your iPhone and Android! General Information Please provide this summary of care documentation to your next provider. Patient Signature:  ____________________________________________________________ Date:  ____________________________________________________________  
  
Oscar Snipe Provider Signature:  ____________________________________________________________ Date:  ____________________________________________________________

## 2017-07-05 NOTE — OP NOTES
[address] [facility] [address], [address]   OP NOTE       Name:  Pavan Long   MR#:  [id number]   :  1945   Account #:  [de-identified]    Surgery Date:  2017   Date of Adm:  2017       PREOPERATIVE DIAGNOSIS: Cosmetic, desire an elective body   contour surgery and scar revision. POSTOPERATIVE DIAGNOSIS: Cosmetic, desire an elective body   contour surgery and scar revision. PROCEDURES PERFORMED: Liposuction to posterior flank or love   handle, liposuction to anterior abdominal wall and lateral flank, and   scar revision to abdominal wall. SURGEON: Airam Goode MD    ANESTHESIA: General.     INDICATIONS FOR PROCEDURE: The patient is a pleasant 67-year-  old female who had previously undergone facial rejuvenation and other   elective body contouring procedures. She also has some existing scars   in the abdominal wall midline and left upper quadrant from previous   unrelated surgical procedures. She was interested in addressing areas   of unwanted fat on her posterior flank or love handle region and the   intra-abdominal wall, as she was unable to obtain the look she desired   on her own through diet and exercise. She was felt to be an acceptable   candidate for liposuction to these areas. In addition, she had an   existing vertical scar from the umbilicus down to the suprapubic region   that measured approximately 18 cm and a transverse scar that   measured almost 12-13 cm and a scar in the left upper quadrant that   was approximately 6 cm. All of these scars will be directly excised and   revised today as part of her body contouring surgery. DESCRIPTION OF PROCEDURE: After appropriate consent was   obtained, preoperative markings were placed. She was taken to the   operating room where she underwent general anesthesia. SCDs   devices were placed and she was rotated in the prone position with   appropriate padding and support.      Her posterior trunk was sterilely prepped and draped and small access   incisions were made in the paramedian lower lumbar region and   standard Klein tumescent fluid x1000 mL was injected into the   posterior flanks bilaterally. After allowing 5 minutes of observation,   standard suction-assisted lipectomy was performed with a   #5 Mercedes-tipped cannula off of each posterior flank or love handle   region. Total amount of fat aspirated from the posterior flank was   approximately 1150 mL. The drapes were removed. The patient was   then transferred back to a transfer gurney and then back to the   operating room table in the supine position with her arms externally   rotated on padded arm boards. The anterior trunk was also sterilely   prepped and draped and similar suction-assisted lipectomy with a   tumescent technique and 1000 mL of Klein tumescent fluid injected   into the anterior abdomen was also performed. We also used the same   #5 Mercedes-tipped cannula. Once we were satisfied with liposuction, the scars on the anterior   abdomen were directly excised in a yarelis de lis pattern resulting in a   vertical incision from the umbilicus down to the suprapubic mons and a   small horizontal component. This again was approximately 18 cm in   length vertically and about 14-16 cm horizontally. This was then closed   in 3 layers, reapproximating the fascia with 3-0 Vicryl, the dermis with   3-0 Vicryl, and the skin with a 3-0 Monocryl. The small 6 cm transverse   scar in the left upper quadrant was also directly excised with a #10   scalpel blade and closed in a similar fashion. One half inch Steri-Strips   with Mastisol were placed in all the incisions, Topifoam was placed on   the posterior flank or love handle region in addition to the anterior   abdomen, and a liposuction garment was placed. At the end of the   procedure, sponge and needle counts were reported as correct. ESTIMATED BLOOD LOSS: 100 mL.     INTRAVENOUS FLUIDS: Approximately 900 mL. SPECIMENS REMOVED: Fat aspirated. A total of 2200 mL with 1150   from the posterior trunk and 1150 from the anterior trunk. COMPLICATIONS: None. DISPOSITION: She was awakened, extubated and transferred to the   recovery room in satisfactory and stable condition. She will be   discharged home today in care of her family with prescriptions and   instructions and will follow up with me within 2 weeks.          Mery Boston MD      [address] / Radha Conroy   D:  07/05/2017   11:41   T:  07/05/2017   13:11   Job #:  [id number]

## 2017-07-05 NOTE — BRIEF OP NOTE
BRIEF OPERATIVE NOTE    Date of Procedure: 7/5/2017   Preoperative Diagnosis: COSMETIC  Postoperative Diagnosis: COSMETIC    Procedure(s):  LIPOSUCTION TWO SITES ABDOMEN AND BILATERAL FLANKS, SCAR REVISION ABDOMEN  SCAR REVISION TRUNK  Surgeon(s) and Role:     * Edelmira Sanabria MD - Primary         Assistant Staff:       Surgical Staff:  Circ-1: Ben Bernal RN  Circ-Relief: Sesar Posada RN  Circ-Intern: Isabel Roy RN  Scrub Tech-1: EnGeneIC  Event Time In   Incision Start 0309   Incision Close 1118     Anesthesia: General   Estimated Blood Loss: 100  Specimens: * No specimens in log *   Findings: normal fat and scar   Complications: none  Implants: * No implants in log *

## 2017-07-05 NOTE — INTERVAL H&P NOTE
H&P Update:  Bharath Blanco was seen and examined. History and physical has been reviewed. The patient has been examined.  There have been no significant clinical changes since the completion of the originally dated History and Physical.    Signed By: Christian Jean MD     July 5, 2017 6:23 AM

## 2017-08-30 ENCOUNTER — HOSPITAL ENCOUNTER (OUTPATIENT)
Dept: CT IMAGING | Age: 72
Discharge: HOME | End: 2017-08-30
Attending: FAMILY MEDICINE
Payer: MEDICARE

## 2017-08-30 DIAGNOSIS — J84.10 PULMONARY FIBROSIS (HCC): ICD-10-CM

## 2017-08-30 DIAGNOSIS — R06.02 SOB (SHORTNESS OF BREATH): ICD-10-CM

## 2017-08-30 PROCEDURE — 71260 CT THORAX DX C+: CPT

## 2017-08-30 PROCEDURE — 74011636320 HC RX REV CODE- 636/320: Performed by: FAMILY MEDICINE

## 2017-08-30 PROCEDURE — 74011000258 HC RX REV CODE- 258: Performed by: FAMILY MEDICINE

## 2017-08-30 RX ORDER — SODIUM CHLORIDE 0.9 % (FLUSH) 0.9 %
10 SYRINGE (ML) INJECTION
Status: COMPLETED | OUTPATIENT
Start: 2017-08-30 | End: 2017-08-30

## 2017-08-30 RX ADMIN — Medication 10 ML: at 10:52

## 2017-08-30 RX ADMIN — IOPAMIDOL 100 ML: 612 INJECTION, SOLUTION INTRAVENOUS at 10:52

## 2017-08-30 RX ADMIN — SODIUM CHLORIDE 100 ML: 900 INJECTION, SOLUTION INTRAVENOUS at 10:52

## 2018-08-14 ENCOUNTER — HOSPITAL ENCOUNTER (OUTPATIENT)
Dept: MRI IMAGING | Age: 73
Discharge: HOME | End: 2018-08-14
Attending: ORTHOPAEDIC SURGERY
Payer: MEDICARE

## 2018-08-14 DIAGNOSIS — S83.242A ACUTE MEDIAL MENISCUS TEAR OF LEFT KNEE: ICD-10-CM

## 2018-08-14 PROCEDURE — 73721 MRI JNT OF LWR EXTRE W/O DYE: CPT

## 2018-12-13 ENCOUNTER — HOSPITAL ENCOUNTER (OUTPATIENT)
Dept: PREADMISSION TESTING | Age: 73
Discharge: HOME | End: 2018-12-13
Payer: MEDICARE

## 2018-12-13 VITALS
BODY MASS INDEX: 26.31 KG/M2 | SYSTOLIC BLOOD PRESSURE: 157 MMHG | HEART RATE: 73 BPM | WEIGHT: 143 LBS | DIASTOLIC BLOOD PRESSURE: 83 MMHG | HEIGHT: 62 IN | TEMPERATURE: 98.2 F

## 2018-12-13 LAB
ATRIAL RATE: 61 BPM
CALCULATED P AXIS, ECG09: 28 DEGREES
CALCULATED R AXIS, ECG10: 34 DEGREES
CALCULATED T AXIS, ECG11: 50 DEGREES
DIAGNOSIS, 93000: NORMAL
P-R INTERVAL, ECG05: 148 MS
Q-T INTERVAL, ECG07: 390 MS
QRS DURATION, ECG06: 80 MS
QTC CALCULATION (BEZET), ECG08: 392 MS
VENTRICULAR RATE, ECG03: 61 BPM

## 2018-12-13 PROCEDURE — 93005 ELECTROCARDIOGRAM TRACING: CPT

## 2018-12-19 ENCOUNTER — ANESTHESIA EVENT (OUTPATIENT)
Dept: MEDSURG UNIT | Age: 73
End: 2018-12-19
Payer: MEDICARE

## 2018-12-19 NOTE — ANESTHESIA PREPROCEDURE EVALUATION
Anesthetic History     PONV          Review of Systems / Medical History  Patient summary reviewed, nursing notes reviewed and pertinent labs reviewed    Pulmonary  Within defined limits                 Neuro/Psych         TIA     Cardiovascular    Hypertension                   GI/Hepatic/Renal     GERD           Endo/Other        Arthritis     Other Findings              Physical Exam    Airway  Mallampati: II  TM Distance: > 6 cm  Neck ROM: normal range of motion   Mouth opening: Normal     Cardiovascular  Regular rate and rhythm,  S1 and S2 normal,  no murmur, click, rub, or gallop             Dental  No notable dental hx       Pulmonary  Breath sounds clear to auscultation               Abdominal  GI exam deferred       Other Findings            Anesthetic Plan    ASA: 2  Anesthesia type: general          Induction: Intravenous  Anesthetic plan and risks discussed with: Patient

## 2018-12-19 NOTE — H&P
Progress Notes       Expand All Collapse All            []Hide copied text    []Danyver for details      Subjective:   Patient ID: Maribell Boss is a 68 y.o. female. Pain Score:   3  Chief Complaint: Follow-up of the Left Knee        77-year-old female comes back today in follow-up for the left knee. She has been having pain for several months and it caused significant swelling. She went to urgent care and even had a Doppler done which showed no clot but she has continued to have significant discomfort in the knee. The injection we gave her last time really help with her swelling but she persisted having symptoms of pain. Pain is mostly medially with also in the medial retinaculum. She is very frustrated has not improved comes back for evaluation. Pain level is a 3 but when she tries to walk on gets much worse. Not improving it is very sharp particularly the medial aspect the knee.        Medical History        Past Medical History:   Diagnosis Date    TIA (transient ischemic attack)           Surgical History         Past Surgical History:   Procedure Laterality Date    NO RELEVANT ORTHOPAEDIC SURGERIES        NO RELEVANT SURGERIES                   Family History   Problem Relation Age of Onset    No Known Problems Mother      No Known Problems Father      No Known Problems Brother      No Known Problems Sister      No Known Problems Son      No Known Problems Daughter      Diabetes Neg Hx      Coronary artery disease Neg Hx      Clotting disorder Neg Hx      Anesthesia problems Neg Hx        Social History   Social History            Socioeconomic History    Marital status:         Spouse name: None    Number of children: None    Years of education: None    Highest education level: None   Social Needs    Financial resource strain: None    Food insecurity - worry: None    Food insecurity - inability: None    Transportation needs - medical: None    Transportation needs - non-medical: None   Occupational History    None   Tobacco Use    Smoking status: Never Smoker    Smokeless tobacco: Never Used   Substance and Sexual Activity    Alcohol use: Yes    Drug use: No    Sexual activity: None   Other Topics Concern    None   Social History Narrative    None         Review of Systems   11/27/2018     Constitutional: Unexplained: Negative  Genitourinary: Frequent Urination: Negative  HEENT: Vision Loss: Negative  Neurological: Memory Loss: Negative  Integumentary: Rash: Negative  Cardiovascular: Palpatations: Negative  Hematologic: Bruises/Bleeds Easily: Negative  Gastrointestinal: Constipation: Negative  Immunological: Seasonal Allergies: Negative  Musculoskeletal: Joint Pain: Positive  Objective:      Vitals       Vitals:     11/27/18 1018   Weight: 140 lb   Height: 5' 2\"         Constitutional:  No acute distress. Well nourished. Well developed. Psychiatric: Alert and oriented x3. Skin:  No marked skin ulcers. Neurological:  No apparent deficits       Patient Active Problem List    Diagnosis Date Noted    Transient cerebral ischemia 06/20/2017    Chest pain 06/20/2017    Dyslipidemia 06/20/2017    Encounter for cosmetic surgery 01/27/2017     Past Medical History:   Diagnosis Date    Arthritis     Diverticulitis 2009    HAS HAD NO EPISODES SINCE COLOSTOMY REVERSAL    GERD (gastroesophageal reflux disease)     Hypertension     not currently on meds for HTN, was in past     Nausea & vomiting     motion sickness issues    Stroke ([facility] [address] 08/2016    TIA ;possible association to prempro. Past Surgical History:   Procedure Laterality Date    ABDOMEN SURGERY PROC UNLISTED  07/05/2017    LIPOSUCTION TWO SITES ABDOMEN AND BILATERAL FLANKS, SCAR REVISION ABDOMEN  SCAR REVISION TRUNK     HX COLONOSCOPY      HX DILATION AND CURETTAGE      MISCARRIAGE IN HER 20s    HX GI  2009?     colostomy for diverticultis and then a reversal    HX HEENT      right eye cataract removed W/ IOL    HX HEENT      tonsillectomy    HX OTHER SURGICAL      cyst removed from wrist    HX OTHER SURGICAL  02/2017    LOWER FACE LIFT    HX TUBAL LIGATION        Prior to Admission medications    Medication Sig Start Date End Date Taking? Authorizing Provider   famotidine (PEPCID RPD PO) Take  by mouth as needed. Yes Provider, Historical   MULTIVITAMIN PO Take  by mouth daily. Yes Provider, Historical   LACTOBACILLUS ACIDOPHILUS (PROBIOTIC PO) Take  by mouth daily. Yes Provider, Historical   CYANOCOBALAMIN, VITAMIN B-12, (VITAMIN B-12 PO) Take  by mouth daily. Yes Provider, Historical   budesonide (RHINOCORT AQUA) 32 mcg/actuation nasal spray 1 [facility] by Both Nostrils route daily. Yes Provider, Historical     Allergies   Allergen Reactions    Codeine Nausea and Vomiting    Morphine Hives    Pcn [Penicillins] Rash    Shellfish Derived Hives and Angioedema     Topical iodine ok on skin      Social History     Tobacco Use    Smoking status: Never Smoker    Smokeless tobacco: Never Used   Substance Use Topics    Alcohol use: Yes     Alcohol/week: 1.8 oz     Types: 3 Glasses of wine per week      Family History   Problem Relation Age of Onset    Cancer Mother         lymphoma    Cancer Father         bladder    Hypertension Father     Hypertension Paternal Uncle     Hypertension Paternal Grandfather     Anesth Problems Neg Hx             Patient Vitals for the past 8 hrs:   BP Temp Pulse Resp SpO2 Height Weight   12/20/18 0741 175/76 98.2 °F (36.8 °C) 79 20 100 % 5' 2\" (1.575 m) 64.8 kg (142 lb 13.7 oz)     General appearance: alert, cooperative, no distress, appears stated age  Head: Normocephalic, without obvious abnormality, atraumatic  Lungs: clear to auscultation bilaterally  Heart: regular rate and rhythm, S1, S2 normal, no murmur  Abdomen: soft, non-tender. Bowel sounds normal. No masses,  no organomegaly  Extremities: She walks with a normal gait today.   Left knee:  No skin changes, rashes, erythema, deformity, or bruising. Minimal to moderate effusion. Tenderness in the medial joint line. Relatively neutral alignment. No real significant opening on valgus stress testing. No opening on varus or anterior-posterior stress testing. No crepitance along the medial joint line. Negative Lachman's negative drawer. Normal patellar tracking. Good quad and hamstring strength good pulses good sensation good cap refill and no edema distally. Range of motion is little tight in extension. Right knee exam has normal alignment and range of motion with no pain. There is good stability in all planes and no crepitance and no effusion. The Lachman's and drawer are negative. The knee is stable to varus and valgus stress testing. The patella tracks well. There is no joint line tenderness. The leg is neurovascular intact distally with no skin changes rashes erythema deformity or bruising about the leg. There is no edema and good pulses distally. Pulses: 2+ and symmetric  Neurologic: Grossly normal           Radiographs:           No imaging obtained       Assessment:      1. Other tear of medial meniscus of left knee as current injury, initial encounter    2. Primary osteoarthritis of right knee           Patient Active Problem List   Diagnosis    Dyslipidemia    Transient cerebral ischemia      Plan:      Last injection helped with her swelling but not with her pain. She has persisted having pretty significant medial sided discomfort. She has some meniscal changes well as some cartilage damage there. She also has some changes behind the patella. We are going to go ahead and set her up for arthroscopy this point because she has not respond to conservative management. Be a left knee arthroscopy with partial medial meniscectomy and medial femoral condyle patellar chondroplasty. Talked about risks and benefits and recovery and she understands all this.   We had extended discussion today about the treatment of this problem. I explained that it is in general a surgical problem. Meniscus will not heal.  There are times for occasionally they will become asymptomatic but for the most part they do not get better. The surgery involves arthroscopic procedure where small incisions were used along with the camera to visualize the meniscus and resect the torn portion. Because copious fluid is used for joint distention the infection rate is low but not 0. If there are problems beyond the meniscus they will also be dealt with as is indicated. All this was explained the patient and the understood and wished to proceed. Will set this up for her right before Livia when she is out of school.         Orders Placed This Encounter    BMI >=25 PATIENT INSTRUCTIONS & EDUCATION      Return in about 2 weeks (around 12/11/2018) for surgery. Marcy Levy MD               Patient was seen and examined. There have been no significant clinical changes since the completion of the above History and Physical.  Patient identified by surgeon; surgical site was confirmed by patient and surgeon.

## 2018-12-20 ENCOUNTER — HOSPITAL ENCOUNTER (OUTPATIENT)
Age: 73
Setting detail: OUTPATIENT SURGERY
Discharge: HOME | End: 2018-12-20
Attending: ORTHOPAEDIC SURGERY | Admitting: ORTHOPAEDIC SURGERY
Payer: MEDICARE

## 2018-12-20 ENCOUNTER — ANESTHESIA (OUTPATIENT)
Dept: MEDSURG UNIT | Age: 73
End: 2018-12-20
Payer: MEDICARE

## 2018-12-20 VITALS
SYSTOLIC BLOOD PRESSURE: 140 MMHG | BODY MASS INDEX: 26.29 KG/M2 | WEIGHT: 142.86 LBS | RESPIRATION RATE: 14 BRPM | TEMPERATURE: 97.4 F | HEIGHT: 62 IN | OXYGEN SATURATION: 97 % | HEART RATE: 53 BPM | DIASTOLIC BLOOD PRESSURE: 75 MMHG

## 2018-12-20 DIAGNOSIS — S83.242A ACUTE MEDIAL MENISCUS TEAR, LEFT, INITIAL ENCOUNTER: Primary | ICD-10-CM

## 2018-12-20 PROCEDURE — 77030008753 HC TU IRR IN/OUT FLO S&N -B: Performed by: ORTHOPAEDIC SURGERY

## 2018-12-20 PROCEDURE — 74011250636 HC RX REV CODE- 250/636: Performed by: ANESTHESIOLOGY

## 2018-12-20 PROCEDURE — 74011250636 HC RX REV CODE- 250/636: Performed by: ORTHOPAEDIC SURGERY

## 2018-12-20 PROCEDURE — 77030002916 HC SUT ETHLN J&J -A: Performed by: ORTHOPAEDIC SURGERY

## 2018-12-20 PROCEDURE — 74011000250 HC RX REV CODE- 250: Performed by: ORTHOPAEDIC SURGERY

## 2018-12-20 PROCEDURE — 74011250636 HC RX REV CODE- 250/636

## 2018-12-20 PROCEDURE — 77030032490 HC SLV COMPR SCD KNE COVD -B: Performed by: ORTHOPAEDIC SURGERY

## 2018-12-20 PROCEDURE — 77030020753 HC CUF TRNQT 1BLA STRY -B: Performed by: ORTHOPAEDIC SURGERY

## 2018-12-20 PROCEDURE — 77030010509 HC AIRWY LMA MSK TELE -A: Performed by: ANESTHESIOLOGY

## 2018-12-20 PROCEDURE — 76060000061 HC AMB SURG ANES 0.5 TO 1 HR: Performed by: ORTHOPAEDIC SURGERY

## 2018-12-20 PROCEDURE — 77030020782 HC GWN BAIR PAWS FLX 3M -B

## 2018-12-20 PROCEDURE — 76210000036 HC AMBSU PH I REC 1.5 TO 2 HR: Performed by: ORTHOPAEDIC SURGERY

## 2018-12-20 PROCEDURE — 74011000250 HC RX REV CODE- 250

## 2018-12-20 PROCEDURE — 77030006884 HC BLD SHV INCIS S&N -B: Performed by: ORTHOPAEDIC SURGERY

## 2018-12-20 PROCEDURE — 76210000057 HC AMBSU PH II REC 1 TO 1.5 HR: Performed by: ORTHOPAEDIC SURGERY

## 2018-12-20 PROCEDURE — 76030000000 HC AMB SURG OR TIME 0.5 TO 1: Performed by: ORTHOPAEDIC SURGERY

## 2018-12-20 PROCEDURE — 77030018835 HC SOL IRR LR ICUM -A: Performed by: ORTHOPAEDIC SURGERY

## 2018-12-20 RX ORDER — SODIUM CHLORIDE 9 MG/ML
25 INJECTION, SOLUTION INTRAVENOUS CONTINUOUS
Status: DISCONTINUED | OUTPATIENT
Start: 2018-12-20 | End: 2018-12-20 | Stop reason: HOSPADM

## 2018-12-20 RX ORDER — LIDOCAINE HYDROCHLORIDE 20 MG/ML
INJECTION, SOLUTION EPIDURAL; INFILTRATION; INTRACAUDAL; PERINEURAL AS NEEDED
Status: DISCONTINUED | OUTPATIENT
Start: 2018-12-20 | End: 2018-12-20 | Stop reason: HOSPADM

## 2018-12-20 RX ORDER — MIDAZOLAM HYDROCHLORIDE 1 MG/ML
INJECTION, SOLUTION INTRAMUSCULAR; INTRAVENOUS AS NEEDED
Status: DISCONTINUED | OUTPATIENT
Start: 2018-12-20 | End: 2018-12-20 | Stop reason: HOSPADM

## 2018-12-20 RX ORDER — PROPOFOL 10 MG/ML
INJECTION, EMULSION INTRAVENOUS AS NEEDED
Status: DISCONTINUED | OUTPATIENT
Start: 2018-12-20 | End: 2018-12-20 | Stop reason: HOSPADM

## 2018-12-20 RX ORDER — SCOLOPAMINE TRANSDERMAL SYSTEM 1 MG/1
1 PATCH, EXTENDED RELEASE TRANSDERMAL ONCE
Status: DISCONTINUED | OUTPATIENT
Start: 2018-12-20 | End: 2018-12-20 | Stop reason: HOSPADM

## 2018-12-20 RX ORDER — FENTANYL CITRATE 50 UG/ML
INJECTION, SOLUTION INTRAMUSCULAR; INTRAVENOUS AS NEEDED
Status: DISCONTINUED | OUTPATIENT
Start: 2018-12-20 | End: 2018-12-20 | Stop reason: HOSPADM

## 2018-12-20 RX ORDER — SODIUM CHLORIDE, SODIUM LACTATE, POTASSIUM CHLORIDE, AND CALCIUM CHLORIDE .6; .31; .03; .02 G/100ML; G/100ML; G/100ML; G/100ML
3000 IRRIGANT IRRIGATION ONCE
Status: COMPLETED | OUTPATIENT
Start: 2018-12-20 | End: 2018-12-20

## 2018-12-20 RX ORDER — DEXAMETHASONE SODIUM PHOSPHATE 4 MG/ML
INJECTION, SOLUTION INTRA-ARTICULAR; INTRALESIONAL; INTRAMUSCULAR; INTRAVENOUS; SOFT TISSUE AS NEEDED
Status: DISCONTINUED | OUTPATIENT
Start: 2018-12-20 | End: 2018-12-20 | Stop reason: HOSPADM

## 2018-12-20 RX ORDER — ONDANSETRON 2 MG/ML
4 INJECTION INTRAMUSCULAR; INTRAVENOUS AS NEEDED
Status: DISCONTINUED | OUTPATIENT
Start: 2018-12-20 | End: 2018-12-20 | Stop reason: HOSPADM

## 2018-12-20 RX ORDER — OXYCODONE AND ACETAMINOPHEN 5; 325 MG/1; MG/1
1 TABLET ORAL
Qty: 30 TAB | Refills: 0 | Status: SHIPPED | OUTPATIENT
Start: 2018-12-20 | End: 2019-04-08

## 2018-12-20 RX ORDER — MIDAZOLAM HYDROCHLORIDE 1 MG/ML
1 INJECTION, SOLUTION INTRAMUSCULAR; INTRAVENOUS AS NEEDED
Status: DISCONTINUED | OUTPATIENT
Start: 2018-12-20 | End: 2018-12-20 | Stop reason: HOSPADM

## 2018-12-20 RX ORDER — LIDOCAINE HYDROCHLORIDE 10 MG/ML
0.1 INJECTION, SOLUTION EPIDURAL; INFILTRATION; INTRACAUDAL; PERINEURAL AS NEEDED
Status: DISCONTINUED | OUTPATIENT
Start: 2018-12-20 | End: 2018-12-20 | Stop reason: HOSPADM

## 2018-12-20 RX ORDER — MIDAZOLAM HYDROCHLORIDE 1 MG/ML
0.5 INJECTION, SOLUTION INTRAMUSCULAR; INTRAVENOUS
Status: DISCONTINUED | OUTPATIENT
Start: 2018-12-20 | End: 2018-12-20 | Stop reason: HOSPADM

## 2018-12-20 RX ORDER — ROPIVACAINE HYDROCHLORIDE 5 MG/ML
30 INJECTION, SOLUTION EPIDURAL; INFILTRATION; PERINEURAL ONCE
Status: COMPLETED | OUTPATIENT
Start: 2018-12-20 | End: 2018-12-20

## 2018-12-20 RX ORDER — PROMETHAZINE HYDROCHLORIDE 12.5 MG/1
25 TABLET ORAL
Qty: 15 TAB | Refills: 0 | Status: SHIPPED | OUTPATIENT
Start: 2018-12-20 | End: 2019-04-08

## 2018-12-20 RX ORDER — PROPOFOL 10 MG/ML
INJECTION, EMULSION INTRAVENOUS
Status: DISCONTINUED | OUTPATIENT
Start: 2018-12-20 | End: 2018-12-20 | Stop reason: HOSPADM

## 2018-12-20 RX ORDER — OXYCODONE HYDROCHLORIDE 5 MG/1
5 TABLET ORAL AS NEEDED
Status: DISCONTINUED | OUTPATIENT
Start: 2018-12-20 | End: 2018-12-20 | Stop reason: HOSPADM

## 2018-12-20 RX ORDER — FENTANYL CITRATE 50 UG/ML
25 INJECTION, SOLUTION INTRAMUSCULAR; INTRAVENOUS
Status: DISCONTINUED | OUTPATIENT
Start: 2018-12-20 | End: 2018-12-20 | Stop reason: HOSPADM

## 2018-12-20 RX ORDER — ONDANSETRON 2 MG/ML
INJECTION INTRAMUSCULAR; INTRAVENOUS AS NEEDED
Status: DISCONTINUED | OUTPATIENT
Start: 2018-12-20 | End: 2018-12-20 | Stop reason: HOSPADM

## 2018-12-20 RX ORDER — SODIUM CHLORIDE, SODIUM LACTATE, POTASSIUM CHLORIDE, CALCIUM CHLORIDE 600; 310; 30; 20 MG/100ML; MG/100ML; MG/100ML; MG/100ML
INJECTION, SOLUTION INTRAVENOUS
Status: DISCONTINUED | OUTPATIENT
Start: 2018-12-20 | End: 2018-12-20 | Stop reason: HOSPADM

## 2018-12-20 RX ORDER — ACETAMINOPHEN 10 MG/ML
INJECTION, SOLUTION INTRAVENOUS AS NEEDED
Status: DISCONTINUED | OUTPATIENT
Start: 2018-12-20 | End: 2018-12-20 | Stop reason: HOSPADM

## 2018-12-20 RX ORDER — SODIUM CHLORIDE, SODIUM LACTATE, POTASSIUM CHLORIDE, CALCIUM CHLORIDE 600; 310; 30; 20 MG/100ML; MG/100ML; MG/100ML; MG/100ML
1000 INJECTION, SOLUTION INTRAVENOUS CONTINUOUS
Status: DISCONTINUED | OUTPATIENT
Start: 2018-12-20 | End: 2018-12-20 | Stop reason: HOSPADM

## 2018-12-20 RX ORDER — DEXMEDETOMIDINE HYDROCHLORIDE 4 UG/ML
INJECTION, SOLUTION INTRAVENOUS AS NEEDED
Status: DISCONTINUED | OUTPATIENT
Start: 2018-12-20 | End: 2018-12-20 | Stop reason: HOSPADM

## 2018-12-20 RX ORDER — FENTANYL CITRATE 50 UG/ML
50 INJECTION, SOLUTION INTRAMUSCULAR; INTRAVENOUS AS NEEDED
Status: DISCONTINUED | OUTPATIENT
Start: 2018-12-20 | End: 2018-12-20 | Stop reason: HOSPADM

## 2018-12-20 RX ORDER — SODIUM CHLORIDE, SODIUM LACTATE, POTASSIUM CHLORIDE, CALCIUM CHLORIDE 600; 310; 30; 20 MG/100ML; MG/100ML; MG/100ML; MG/100ML
100 INJECTION, SOLUTION INTRAVENOUS CONTINUOUS
Status: DISCONTINUED | OUTPATIENT
Start: 2018-12-20 | End: 2018-12-20 | Stop reason: HOSPADM

## 2018-12-20 RX ORDER — ROPIVACAINE HYDROCHLORIDE 5 MG/ML
150 INJECTION, SOLUTION EPIDURAL; INFILTRATION; PERINEURAL AS NEEDED
Status: DISCONTINUED | OUTPATIENT
Start: 2018-12-20 | End: 2018-12-20 | Stop reason: HOSPADM

## 2018-12-20 RX ADMIN — SODIUM CHLORIDE, SODIUM LACTATE, POTASSIUM CHLORIDE, CALCIUM CHLORIDE: 600; 310; 30; 20 INJECTION, SOLUTION INTRAVENOUS at 08:08

## 2018-12-20 RX ADMIN — ACETAMINOPHEN 1000 MG: 10 INJECTION, SOLUTION INTRAVENOUS at 08:21

## 2018-12-20 RX ADMIN — FENTANYL CITRATE 25 MCG: 50 INJECTION, SOLUTION INTRAMUSCULAR; INTRAVENOUS at 08:18

## 2018-12-20 RX ADMIN — FENTANYL CITRATE 25 MCG: 50 INJECTION, SOLUTION INTRAMUSCULAR; INTRAVENOUS at 08:59

## 2018-12-20 RX ADMIN — DEXMEDETOMIDINE HYDROCHLORIDE 20 MCG: 4 INJECTION, SOLUTION INTRAVENOUS at 08:21

## 2018-12-20 RX ADMIN — ONDANSETRON 4 MG: 2 INJECTION INTRAMUSCULAR; INTRAVENOUS at 11:09

## 2018-12-20 RX ADMIN — FENTANYL CITRATE 25 MCG: 50 INJECTION, SOLUTION INTRAMUSCULAR; INTRAVENOUS at 08:27

## 2018-12-20 RX ADMIN — FENTANYL CITRATE 25 MCG: 50 INJECTION, SOLUTION INTRAMUSCULAR; INTRAVENOUS at 09:55

## 2018-12-20 RX ADMIN — PROPOFOL 200 MCG/KG/MIN: 10 INJECTION, EMULSION INTRAVENOUS at 08:19

## 2018-12-20 RX ADMIN — LIDOCAINE HYDROCHLORIDE 40 MG: 20 INJECTION, SOLUTION EPIDURAL; INFILTRATION; INTRACAUDAL; PERINEURAL at 08:18

## 2018-12-20 RX ADMIN — FENTANYL CITRATE 25 MCG: 50 INJECTION, SOLUTION INTRAMUSCULAR; INTRAVENOUS at 11:17

## 2018-12-20 RX ADMIN — FENTANYL CITRATE 25 MCG: 50 INJECTION, SOLUTION INTRAMUSCULAR; INTRAVENOUS at 08:35

## 2018-12-20 RX ADMIN — SODIUM CHLORIDE, SODIUM LACTATE, POTASSIUM CHLORIDE, AND CALCIUM CHLORIDE 1000 ML: 600; 310; 30; 20 INJECTION, SOLUTION INTRAVENOUS at 07:15

## 2018-12-20 RX ADMIN — PROPOFOL 50 MG: 10 INJECTION, EMULSION INTRAVENOUS at 08:23

## 2018-12-20 RX ADMIN — DEXAMETHASONE SODIUM PHOSPHATE 4 MG: 4 INJECTION, SOLUTION INTRA-ARTICULAR; INTRALESIONAL; INTRAMUSCULAR; INTRAVENOUS; SOFT TISSUE at 08:24

## 2018-12-20 RX ADMIN — ONDANSETRON 4 MG: 2 INJECTION INTRAMUSCULAR; INTRAVENOUS at 08:24

## 2018-12-20 RX ADMIN — PROPOFOL 150 MG: 10 INJECTION, EMULSION INTRAVENOUS at 08:18

## 2018-12-20 RX ADMIN — MIDAZOLAM HYDROCHLORIDE 2 MG: 1 INJECTION, SOLUTION INTRAMUSCULAR; INTRAVENOUS at 08:13

## 2018-12-20 NOTE — DISCHARGE INSTRUCTIONS
IV Tylenol given at 8:25am, next dose at 2:25pm.     You have a scopolamine patch behind your left ear. This patch is to help prevent nausea and vomiting and can be worn for 3 days, no longer  The most common side effects are dry mouth/dry eyes. If you are not experiencing nausea and are experiencing side affects you may remove the patch sooner. Please remove the patch no later than 12/22. Be sure to dispose of patch where children or pets cannot access it, wash your hands thoroughly, and do not touch your eyes. POST OPERATIVE INSTRUCTIONS  Knee Arthroscopy   MD Francis Zuleta PA-C      First meal at home should be clear liquids. Progress to regular diet as tolerated. Apply an ice bag or use cold therapy device for 20-30 minutes 4 times a day for the first week to reduce swelling and pain and then use as desired. You may remove the bulky dressing 24-48 hours after surgery and at that time it is ok to shower. Apply band aids over the small incisions and change daily after showers. Elevate your surgical leg when sitting or sleeping to reduce swelling. Do not place a pillow directly under the knee, place it under your ankle. Exercises- practice quadricep tightening, straight leg raising exercises and ankle pumps several times a day. Gradually work on bending your knee as tolerated. See below    Crutches will be provided if you do not already have them. Use crutches for the first 2 days and then you may fully weight bear with 2 crutches and progress off of them as you tolerate.     Let pain be your guide, too much pain, too much activity      Medication Instructions are listed below:    Oxycodone           PLEASE TAKE 1 ASPIRIN (325mg ) TWICE  A DAY FOR 10 DAYS   All pain medication can cause constipation, please drink plenty of water and suggest using over the counter stool softener such as Miralax      If you develop a fever greater than 101, unexpected redness, or swelling, in your leg, please call the office . Some bleeding and or drainage can be expected the first few days after surgery. A post- op appointment has already been made for you.____1/2/19 at 1:30pm with maldonado Blunt PA-C   __________________________________  Please call the office if you need change the date or time of your appointment. ¨ Thank you for following the above instructions. If you have any questions, please call the office and the  will put you in touch with one of my assistants. :                                                                        Post Operative Exercises      Quad sets    1. Sit with your leg straight and supported on the floor or a firm bed. (If you feel discomfort in the front or back of your knee, place a small towel roll under your knee.)  2. Tighten the muscles on top of your thigh by pressing the back of your knee flat down to the floor. (If you feel discomfort under your kneecap, place a small towel roll under your knee.)  3. Hold for about 6 seconds, then rest up to 10 seconds. 4. Do 8 to 12 repetitions several times a day. Straight-leg raises to the front    1. Lie on your back with your good knee bent so that your foot rests flat on the floor. Your injured leg should be straight. Make sure that your low back has a normal curve. You should be able to slip your flat hand in between the floor and the small of your back, with your palm touching the floor and your back touching the back of your hand. 2. Tighten the thigh muscles in the injured leg by pressing the back of your knee flat down to the floor. Hold your knee straight. 3. Keeping the thigh muscles tight, lift your injured leg up so that your heel is about 12 inches off the floor. Hold for 5 seconds and then lower slowly. 4. Do 8 to 12 repetitions. Advance to doing this exercise from a sitting position. Heel Slides :   ¨  Lie down on the floor or the bed with your leg flat.  Slowly begin to slide your heel toward your rear end (buttocks), keeping your heel on the floor. Your knee will begin to bend. Slide your heel and bend your knee until it becomes a little sore and you can feel a small amount of pressure inside your knee. Hold this position for 10 seconds. Slide your heel back down until your leg is straight on the floor. Relax for 10 seconds. Repeat this exercise 20 times. DISCHARGE SUMMARY from Nurse    PATIENT INSTRUCTIONS:    After general anesthesia or intravenous sedation, for 24 hours or while taking prescription Narcotics:  · Limit your activities  · Do not drive and operate hazardous machinery  · Do not make important personal or business decisions  · Do  not drink alcoholic beverages  · If you have not urinated within 8 hours after discharge, please contact your surgeon on call. Report the following to your surgeon:  · Excessive pain, swelling, redness or odor of or around the surgical area  · Temperature over 100.5  · Nausea and vomiting lasting longer than 4 hours or if unable to take medications  · Any signs of decreased circulation or nerve impairment to extremity: change in color, persistent  numbness, tingling, coldness or increase pain  · Any questions    What to do at Home:  Recommended activity: See surgical instructions. If you experience any of the following symptoms as noted above, please follow up with Dr. Elgin Ch. *  Please give a list of your current medications to your Primary Care Provider. *  Please update this list whenever your medications are discontinued, doses are      changed, or new medications (including over-the-counter products) are added. *  Please carry medication information at all times in case of emergency situations.     These are general instructions for a healthy lifestyle:    No smoking/ No tobacco products/ Avoid exposure to second hand smoke  Surgeon General's Warning:  Quitting smoking now greatly reduces serious risk to your health. Obesity, smoking, and sedentary lifestyle greatly increases your risk for illness    A healthy diet, regular physical exercise & weight monitoring are important for maintaining a healthy lifestyle    You may be retaining fluid if you have a history of heart failure or if you experience any of the following symptoms:  Weight gain of 3 pounds or more overnight or 5 pounds in a week, increased swelling in our hands or feet or shortness of breath while lying flat in bed. Please call your doctor as soon as you notice any of these symptoms; do not wait until your next office visit. Recognize signs and symptoms of STROKE:    F-face looks uneven    A-arms unable to move or move unevenly    S-speech slurred or non-existent    T-time-call 911 as soon as signs and symptoms begin-DO NOT go       Back to bed or wait to see if you get better-TIME IS BRAIN. Warning Signs of HEART ATTACK     Call 911 if you have these symptoms:   Chest discomfort. Most heart attacks involve discomfort in the center of the chest that lasts more than a few minutes, or that goes away and comes back. It can feel like uncomfortable pressure, squeezing, fullness, or pain.  Discomfort in other areas of the upper body. Symptoms can include pain or discomfort in one or both arms, the back, neck, jaw, or stomach.  Shortness of breath with or without chest discomfort.  Other signs may include breaking out in a cold sweat, nausea, or lightheadedness. Don't wait more than five minutes to call 911 - MINUTES MATTER! Fast action can save your life. Calling 911 is almost always the fastest way to get lifesaving treatment. Emergency Medical Services staff can begin treatment when they arrive -- up to an hour sooner than if someone gets to the hospital by car. The discharge information has been reviewed with the caregiver. The caregiver verbalized understanding.   Discharge medications reviewed with the caregiver and appropriate educational materials and side effects teaching were provided.   ___________________________________________________________________________________________________________________________________

## 2018-12-20 NOTE — ROUTINE PROCESS
Patient: Clive Coello MRN: [id number]  SSN: [id number]   YOB: 1945  Age: 68 y.o. Sex: female     Patient is status post Procedure(s):  LEFT KNEE ARTHROSCOPY AND PARTIAL MEDIAL MENISECTOMY WITH CHONDROPLASTY OF PATELLA AND MEDIAL FEMORAL CONDYLE.     Surgeon(s) and Role:     Cari Allen MD (Jody) - Primary     * Leticia Fitzpatrick MD - Fellow    Local/Dose/Irrigation:  See [facility]                  Peripheral IV 12/20/18 Left Forearm (Active)   Site Assessment Clean, dry, & intact 12/20/2018  7:29 AM   Phlebitis Assessment 0 12/20/2018  7:29 AM   Infiltration Assessment 0 12/20/2018  7:29 AM   Dressing Status Clean, dry, & intact 12/20/2018  7:29 AM   Alcohol Cap Used Yes 12/20/2018  7:29 AM            Airway - Endotracheal Tube 12/20/18 (Active)                   Dressing/Packing:  Wound Knee Left-DRESSING TYPE: 4 x 4;Cast padding;Elastic bandage (12/20/18 0700)  Splint/Cast:  ]    Other:

## 2018-12-20 NOTE — ANESTHESIA POSTPROCEDURE EVALUATION
Procedure(s):  LEFT KNEE ARTHROSCOPY AND PARTIAL MEDIAL MENISECTOMY WITH CHONDROPLASTY OF PATELLA AND MEDIAL FEMORAL CONDYLE.    <BSHSIANPOST>    Visit Vitals  BP (!) 73/38   Pulse 75   Temp 36.3 °C (97.4 °F)   Resp 11   Ht 5' 2\" (1.575 m)   Wt 64.8 kg (142 lb 13.7 oz)   SpO2 98%   BMI 26.13 kg/m²

## 2019-01-02 NOTE — OP NOTES
Operative Report      Patient: Lorraine Veronica MRN: [id number]  SSN: [id number]    YOB: 1945  Age: 68 y.o. Sex: female      DATE OF SURGERY:  12/20/2018    Date of Surgery: 12/20/2018     Preoperative Diagnosis:    LEFT MEDIAL MENISCAL TEAR, LEFT KNEE DEGENERATIVE JOINT DISEASE    Postoperative Diagnosis:  LEFT MEDIAL MENISCAL TEAR, LEFT KNEE DEGENERATIVE JOINT DISEASE    Procedures: LEFT KNEE ARTHROSCOPY AND PARTIAL MEDIAL MENISECTOMY WITH CHONDROPLASTY OF PATELLA AND MEDIAL FEMORAL CONDYLE    Surgeon(s) and Role:     Cari Frazier MD (Jody) - Primary     First assistant:  Jalyn Du PA-C    2nd assistant:  Marlen Cabrera MD    Anesthesia: General with local    Estimated Blood Loss:  Minimal    Pathology: DJD and medial meniscus tear    Specimens: * No specimens in log *     Complications: None     Hospital Problems  Date Reviewed: 7/5/2017    None          Indications: The patient is a 68 y.o. female with a known history of medial meniscus tear in the left knee. Preoperative physical examination, radiographs, and magnetic resonance imaging demonstrated Medial Meniscus Tear in Her left knee. She is now electively admitted for knee arthroscopy. Procedure in Detail: After the procedure was explained to the patient, including the risks, benefits and possible complications, the patient signed the informed consent. Following identification, the patient was taken to the operating suite. Following induction of general anesthesia and administration of antibiotic, the patient was positioned on the operating table in the supine fashion. The left knee was then examined under anesthesia. The patient was noted to have a negative Lachman and pivot shift. At this point, the left leg was then prepped and draped in sterile fashion. The left leg was then elevated and exsanguinated with an Esmarch bandage. The tourniquet was elevated to 300 mmHg.  At this point, a lateral portal was created and the joint was distended and fully inspected. The scope was introduced into the knee. Diagnostic arthroscopy commenced. Suprapatellar pouch and medial and lateral gutters were visualized. The undersurface of the patella and trochlea groove were well visualized. 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 Both were debrided using a sucker shaver down to stable tissue particularly the patella. Most of the wear was on the superior portion of patella. The scope was then advanced into the medial compartment. The patient's leg was flexed 30 degrees. The medial  compartment was visualized in its entirety and a medial portal was created from outside in using a spinal needle for localization. The medial compartment was thoroughly evaluated and the meniscus visualized. There was a medial meniscal tear. The medial tibial plateau was   Involved with grade 3 and 4 change particularly in the area in the anterior lateral aspect of the plateau. The medial femoral condyle was  Involved with grade 2 and 3 change with a small area of grade 4. All these were carefully debrided using a sucker shaver back to stable tissue. Using a punch and a sucker shaver the meniscus tear was excised . It was a complex type tear at the junction of the posterior horn and medial meniscus body. Approximately a 1/3 the width of the meniscus was removed , mostly on the inferior portion of the meniscus. The scope was then advanced to the notch. The anterior cruciate ligament was intact. The posterior cruciate ligament was intact. The scope was then introduced to the lateral compartment. The patient's legs were put in the figure-of four position. Lateral meniscus was visualized in its entirety, both above and below the popliteal hiatus. The lateral meniscus was  normal. . The articular surfaces were  normal.  The knee was then drained and the portals closed with 2-0 nylon sutures.  The knee was then injected with 30cc's of Ropivicaine. The knee was dressed with a sterile dressing and an ACE bandage and the patient was awakened and taken to the PACU in stable condition. The patient tolerated the procedure well, there were no complications, no drains were placed. Signed: Cari Aly MD (12/20/2018 at 6:35 AM)

## 2019-04-08 RX ORDER — CELECOXIB 200 MG/1
200 CAPSULE ORAL DAILY
COMMUNITY
End: 2020-03-05

## 2019-04-09 ENCOUNTER — ANESTHESIA (OUTPATIENT)
Dept: ENDOSCOPY | Age: 74
End: 2019-04-09
Payer: MEDICARE

## 2019-04-09 ENCOUNTER — ANESTHESIA EVENT (OUTPATIENT)
Dept: ENDOSCOPY | Age: 74
End: 2019-04-09
Payer: MEDICARE

## 2019-04-09 ENCOUNTER — HOSPITAL ENCOUNTER (OUTPATIENT)
Age: 74
Setting detail: OUTPATIENT SURGERY
Discharge: HOME | End: 2019-04-09
Attending: SPECIALIST | Admitting: SPECIALIST
Payer: MEDICARE

## 2019-04-09 VITALS
OXYGEN SATURATION: 100 % | RESPIRATION RATE: 12 BRPM | BODY MASS INDEX: 26.13 KG/M2 | HEART RATE: 65 BPM | WEIGHT: 142 LBS | HEIGHT: 62 IN | SYSTOLIC BLOOD PRESSURE: 152 MMHG | TEMPERATURE: 97.5 F | DIASTOLIC BLOOD PRESSURE: 89 MMHG

## 2019-04-09 PROCEDURE — 74011250636 HC RX REV CODE- 250/636

## 2019-04-09 PROCEDURE — 76040000007: Performed by: SPECIALIST

## 2019-04-09 PROCEDURE — 88305 TISSUE EXAM BY PATHOLOGIST: CPT

## 2019-04-09 PROCEDURE — 77030009426 HC FCPS BIOP ENDOSC BSC -B: Performed by: SPECIALIST

## 2019-04-09 PROCEDURE — 76060000032 HC ANESTHESIA 0.5 TO 1 HR: Performed by: SPECIALIST

## 2019-04-09 PROCEDURE — 88342 IMHCHEM/IMCYTCHM 1ST ANTB: CPT

## 2019-04-09 RX ORDER — MIDAZOLAM HYDROCHLORIDE 1 MG/ML
.25-1 INJECTION, SOLUTION INTRAMUSCULAR; INTRAVENOUS
Status: DISCONTINUED | OUTPATIENT
Start: 2019-04-09 | End: 2019-04-09 | Stop reason: HOSPADM

## 2019-04-09 RX ORDER — PROPOFOL 10 MG/ML
INJECTION, EMULSION INTRAVENOUS AS NEEDED
Status: DISCONTINUED | OUTPATIENT
Start: 2019-04-09 | End: 2019-04-09 | Stop reason: HOSPADM

## 2019-04-09 RX ORDER — ATROPINE SULFATE 0.1 MG/ML
0.5 INJECTION INTRAVENOUS
Status: DISCONTINUED | OUTPATIENT
Start: 2019-04-09 | End: 2019-04-09 | Stop reason: HOSPADM

## 2019-04-09 RX ORDER — FLUMAZENIL 0.1 MG/ML
0.2 INJECTION INTRAVENOUS
Status: DISCONTINUED | OUTPATIENT
Start: 2019-04-09 | End: 2019-04-09 | Stop reason: HOSPADM

## 2019-04-09 RX ORDER — FENTANYL CITRATE 50 UG/ML
200 INJECTION, SOLUTION INTRAMUSCULAR; INTRAVENOUS
Status: DISCONTINUED | OUTPATIENT
Start: 2019-04-09 | End: 2019-04-09 | Stop reason: HOSPADM

## 2019-04-09 RX ORDER — SODIUM CHLORIDE 0.9 % (FLUSH) 0.9 %
5-40 SYRINGE (ML) INJECTION EVERY 8 HOURS
Status: DISCONTINUED | OUTPATIENT
Start: 2019-04-09 | End: 2019-04-09 | Stop reason: HOSPADM

## 2019-04-09 RX ORDER — DEXTROMETHORPHAN/PSEUDOEPHED 2.5-7.5/.8
1.2 DROPS ORAL
Status: DISCONTINUED | OUTPATIENT
Start: 2019-04-09 | End: 2019-04-09 | Stop reason: HOSPADM

## 2019-04-09 RX ORDER — SODIUM CHLORIDE 0.9 % (FLUSH) 0.9 %
5-40 SYRINGE (ML) INJECTION AS NEEDED
Status: DISCONTINUED | OUTPATIENT
Start: 2019-04-09 | End: 2019-04-09 | Stop reason: HOSPADM

## 2019-04-09 RX ORDER — LIDOCAINE HYDROCHLORIDE 20 MG/ML
INJECTION, SOLUTION EPIDURAL; INFILTRATION; INTRACAUDAL; PERINEURAL AS NEEDED
Status: DISCONTINUED | OUTPATIENT
Start: 2019-04-09 | End: 2019-04-09 | Stop reason: HOSPADM

## 2019-04-09 RX ORDER — SODIUM CHLORIDE 9 MG/ML
INJECTION, SOLUTION INTRAVENOUS
Status: DISCONTINUED | OUTPATIENT
Start: 2019-04-09 | End: 2019-04-09 | Stop reason: HOSPADM

## 2019-04-09 RX ORDER — OMEPRAZOLE 20 MG/1
20 CAPSULE, DELAYED RELEASE ORAL DAILY
COMMUNITY
End: 2019-04-09

## 2019-04-09 RX ORDER — SODIUM CHLORIDE 9 MG/ML
50 INJECTION, SOLUTION INTRAVENOUS CONTINUOUS
Status: DISCONTINUED | OUTPATIENT
Start: 2019-04-09 | End: 2019-04-09 | Stop reason: SDUPTHER

## 2019-04-09 RX ORDER — NALOXONE HYDROCHLORIDE 0.4 MG/ML
0.4 INJECTION, SOLUTION INTRAMUSCULAR; INTRAVENOUS; SUBCUTANEOUS
Status: DISCONTINUED | OUTPATIENT
Start: 2019-04-09 | End: 2019-04-09 | Stop reason: HOSPADM

## 2019-04-09 RX ORDER — SODIUM CHLORIDE 9 MG/ML
50 INJECTION, SOLUTION INTRAVENOUS CONTINUOUS
Status: DISCONTINUED | OUTPATIENT
Start: 2019-04-09 | End: 2019-04-09 | Stop reason: HOSPADM

## 2019-04-09 RX ORDER — EPINEPHRINE 0.1 MG/ML
1 INJECTION INTRACARDIAC; INTRAVENOUS
Status: DISCONTINUED | OUTPATIENT
Start: 2019-04-09 | End: 2019-04-09 | Stop reason: HOSPADM

## 2019-04-09 RX ADMIN — PROPOFOL 10 MG: 10 INJECTION, EMULSION INTRAVENOUS at 08:25

## 2019-04-09 RX ADMIN — PROPOFOL 50 MG: 10 INJECTION, EMULSION INTRAVENOUS at 08:21

## 2019-04-09 RX ADMIN — PROPOFOL 50 MG: 10 INJECTION, EMULSION INTRAVENOUS at 08:23

## 2019-04-09 RX ADMIN — LIDOCAINE HYDROCHLORIDE 50 MG: 20 INJECTION, SOLUTION EPIDURAL; INFILTRATION; INTRACAUDAL; PERINEURAL at 08:21

## 2019-04-09 RX ADMIN — LIDOCAINE HYDROCHLORIDE 50 MG: 20 INJECTION, SOLUTION EPIDURAL; INFILTRATION; INTRACAUDAL; PERINEURAL at 08:18

## 2019-04-09 RX ADMIN — SODIUM CHLORIDE: 9 INJECTION, SOLUTION INTRAVENOUS at 07:35

## 2019-04-09 NOTE — PERIOP NOTES
51F savory  dilatation of the esophagus No subcutaneous crepitus of the chest or cervical region was noted post dilatation.

## 2019-04-09 NOTE — PROGRESS NOTES
Tio Fleming 1945 
[id number] Situation: 
Verbal report received from: Celia 
Procedure: Procedure(s): ESOPHAGOGASTRODUODENOSCOPY (EGD) ESOPHAGOGASTRODUODENAL (EGD) BIOPSY 
ESOPHAGEAL DILATION Background: 
 
Preoperative diagnosis: EPIGASTRIC PAIN, GASTROESOPHAGEAL REFLUX Postoperative diagnosis: Schatkzi's ring, hiatal hernia, gastritis :  Dr. Chuckie Randle Assistant(s): Endoscopy Technician-1: Alix García Endoscopy RN-1: Lillie Peña RN Specimens:  
ID Type Source Tests Collected by Time Destination 1 : antral bx Preservative   Roxie Perez MD 4/9/2019 0877 Pathology H. Pylori  no Assessment: 
Intra-procedure medications Anesthesia gave intra-procedure sedation and medications, see anesthesia flow sheet yes Intravenous fluids: NS@ Merleen Innocent Vital signs stable Abdominal assessment: round and soft Recommendation: 
Discharge patient per MD orde Family or Friend Permission to share finding with family or friend yes

## 2019-04-09 NOTE — PROCEDURES
[address]                 NAME:  Parul Florian   :   1945   MRN:   [id number]     Date/Time:  2019 8:36 AM    Esophagogastroduodenoscopy (EGD) Procedure Note    :  Chere Dance, MD    Referring Provider:  None    Anethesia/Sedation:  MAC anesthesia Propofol    Preoperative diagnosis: EPIGASTRIC PAIN, GASTROESOPHAGEAL REFLUX    Postoperative diagnosis: Schatkzi's ring, hiatal hernia, gastritis    Procedure Details     After infom consent was obtained for the procedure, with all risks and benefits of procedure explained the patient was taken to the endoscopy suite and placed in the left lateral decubitus position. Following sequential administration of sedation as per above, the [id number] gastroscope was inserted into the mouth and advanced under direct vision to second portion of the duodenum. A careful inspection was made as the gastroscope was withdrawn, including a retroflexed view of the proximal stomach; findings and interventions are described below. Findings:  Esophagus: Moderate hiatal hernia with Z line at 34 cm. Schatzki ring dilated with 51 F Savary dilator. Stomach:Patchy erythema antrum, biopsies done. Duodenum/jejunum:normal      Therapies:  esophageal dilation with savary sizes 46 F    Specimens: Antral biopsies           EBL: None    Complications:   None; patient tolerated the procedure well. Impression:    See Postoperative diagnosis above    Recommendations:  -Acid suppression with a proton pump inhibitor. , -Await pathology.     Discharge disposition:  Home in the company of  when able to ambulate    Chere Dance, MD

## 2019-04-09 NOTE — DISCHARGE INSTRUCTIONS
Lory Celis  [id number]  1945    EGD DISCHARGE INSTRUCTIONS  Discomfort:  Sore throat- throat lozenges or warm salt water gargle  redness at IV site- apply warm compress to area; if redness or soreness persist- contact your physician  Gaseous discomfort- walking, belching will help relieve any discomfort  You may not operate a vehicle for 12 hours  You may not engage in an occupation involving machinery or appliances for rest of today. You may not drink alcoholic beverages for at least 12 hours  Avoid making any critical decisions for at least 24 hour  DIET  You may resume your regular diet - however -  remember your colon is empty and a heavy meal will produce gas. Avoid these foods:  vegetables, fried / greasy foods, carbonated drinks  MEDICATIONS   Regarding Aspirin or Nonsteroidal medications specifically, please see below. ACTIVITY  You may resume your normal daily activities. Spend the remainder of the day resting -  avoid any strenuous activity. CALL M.D. ANY SIGN OF   Increasing pain, nausea, vomiting  Abdominal distension (swelling)  New increased bleeding (oral or rectal)  Fever (chills)  Pain in chest area  Bloody discharge from nose or mouth  Shortness of breath    You may not  take any Advil, Aspirin, Ibuprofen, Motrin, Aleve, or Goodys for 10 days, ONLY  Tylenol as needed for pain. Post procedure diagnosis: Schatkzi's ring, hiatal hernia, gastritis      Follow-up Instructions:   Call Dr. Lois Mijares  Results of procedure / biopsy in 10 days  Telephone #  [phone]        DISCHARGE SUMMARY from Nurse    The following personal items collected during your admission are returned to you:   Dental Appliance: Dental Appliances: None  Vision: Visual Aid: None  Hearing Aid:    Jewelry:    Clothing:    Other Valuables:    Valuables sent to safe:      Patient Education   Patient Education   Patient Education        Learning About Schatzki's Ring  What is Schatzki's ring?     A Schatzki's ring is a ring of tissue that forms inside the esophagus, the tube that carries food and liquid to your stomach. This ring makes the esophagus narrow in one area, close to where it meets the stomach. It can make it hard to swallow. You may feel like food gets stuck in your esophagus. Doctors aren't sure exactly what causes these rings. The ring is also something you can be born with. How is Schatzki's ring diagnosed? Your doctor may check your esophagus if you are having trouble swallowing or if you feel like food is getting stuck. The doctor will use a tool called an endoscope, or scope. It's a thin, flexible, lighted viewing tool. It goes into the mouth and down the throat. Your doctor can use it to check for any problems. The scope can also be used to take a sample of tissue to test (biopsy). You might need an X-ray. For the X-ray, you may need to swallow a substance, such as barium, that makes it easier to see what happens in your esophagus. How is Schatzki's ring treated? A Schatzki's ring is usually treated with a procedure called esophageal dilation. Dilation can open up narrow areas of the esophagus. Before the procedure, you will get medicines through a needle in your vein (IV) in your arm or hand. These medicines reduce pain and will make you feel relaxed and drowsy. Your throat will also be numbed. You may not remember much about the treatment. The doctor will guide a balloon or a plastic tool for widening (dilator) down your throat and into your esophagus. The dilator is used to widen any narrow area. To guide the dilator, the doctor may use a scope. Or he or she may use a thin wire as a guide. After the procedure, you will be observed for 1 to 2 hours until the medicines wear off. If your throat was numbed before the test, you should not eat or drink until your throat is no longer numb. When you are fully recovered, you can go home.   You will not be able to drive or operate machinery for 12 hours after the test. Your doctor will tell you when you can go back to your usual diet and activities. Do not drink alcohol for 12 to 24 hours after the test.  You may still need to treat some symptoms of GERD. Your doctor may give you information about that. Follow-up care is a key part of your treatment and safety. Be sure to make and go to all appointments, and call your doctor if you are having problems. It's also a good idea to know your test results and keep a list of the medicines you take. Where can you learn more? Go to http://javier-charly.info/. Enter [id number] in the search box to learn more about \"Learning About Schatzki's Ring. \"  Current as of: March 27, 2018  Content Version: 11.9  © 0069-8475 [employer]. Care instructions adapted under license by [employer] (which disclaims liability or warranty for this information). If you have questions about a medical condition or this instruction, always ask your healthcare professional. [place] [address] any warranty or liability for your use of this information. Hiatal Hernia: Care Instructions  Your Care Instructions  A hiatal hernia occurs when part of the stomach bulges into the chest cavity. A hiatal hernia may allow stomach acid and juices to back up into the esophagus (acid reflux). This can cause a feeling of burning, warmth, heat, or pain behind the breastbone. This feeling may often occur after you eat, soon after you lie down, or when you bend forward, and it may come and go. You also may have a sour taste in your mouth. These symptoms are commonly known as heartburn or reflux. But not all hiatal hernias cause symptoms. Follow-up care is a key part of your treatment and safety. Be sure to make and go to all appointments, and call your doctor if you are having problems. It's also a good idea to know your test results and keep a list of the medicines you take.   How can you care for yourself at home? · Take your medicines exactly as prescribed. Call your doctor if you think you are having a problem with your medicine. · Do not take aspirin or other nonsteroidal anti-inflammatory drugs (NSAIDs), such as ibuprofen (Advil, Motrin) or naproxen (Aleve), unless your doctor says it is okay. Ask your doctor what you can take for pain. · Your doctor may recommend over-the-counter medicine. For mild or occasional indigestion, antacids such as Tums, Gaviscon, Maalox, or Mylanta may help. Your doctor also may recommend over-the-counter acid reducers, such as famotidine (Pepcid AC), cimetidine (Tagamet HB), ranitidine (Zantac 75 and Zantac 150), or omeprazole (Prilosec). Read and follow all instructions on the label. If you use these medicines often, talk with your doctor. · Change your eating habits. ? It's best to eat several small meals instead of two or three large meals. ? After you eat, wait 2 to 3 hours before you lie down. Late-night snacks aren't a good idea. ? Chocolate, mint, and alcohol can make heartburn worse. They relax the valve between the esophagus and the stomach. ? Spicy foods, foods that have a lot of acid (like tomatoes and oranges), and coffee can make heartburn symptoms worse in some people. If your symptoms are worse after you eat a certain food, you may want to stop eating that food to see if your symptoms get better. · Do not smoke or chew tobacco.  · If you get heartburn at night, raise the head of your bed 6 to 8 inches by putting the frame on blocks or placing a foam wedge under the head of your mattress. (Adding extra pillows does not work.)  · Do not wear tight clothing around your middle. · Lose weight if you need to. Losing just 5 to 10 pounds can help. When should you call for help?   Call your doctor now or seek immediate medical care if:    · You have new or worse belly pain.     · You are vomiting.    Watch closely for changes in your health, and be sure to contact your doctor if:    · You have new or worse symptoms of indigestion.     · You have trouble or pain swallowing.     · You are losing weight.     · You do not get better as expected. Where can you learn more? Go to http://javier-charly.info/. Enter U609 in the search box to learn more about \"Hiatal Hernia: Care Instructions. \"  Current as of: March 27, 2018  Content Version: 11.9  © 9528-3640 [employer]. Care instructions adapted under license by [employer] (which disclaims liability or warranty for this information). If you have questions about a medical condition or this instruction, always ask your healthcare professional. [address] any warranty or liability for your use of this information. Gastritis: Care Instructions  Your Care Instructions    Gastritis is a sore and upset stomach. It happens when something irritates the stomach lining. Many things can cause it. These include an infection such as the flu or something you ate or drank. Medicines or a sore on the lining of the stomach (ulcer) also can cause it. Your belly may bloat and ache. You may belch, vomit, and feel sick to your stomach. You should be able to relieve the problem by taking medicine. And it may help to change your diet. If gastritis lasts, your doctor may prescribe medicine. Follow-up care is a key part of your treatment and safety. Be sure to make and go to all appointments, and call your doctor if you are having problems. It's also a good idea to know your test results and keep a list of the medicines you take. How can you care for yourself at home? · If your doctor prescribed antibiotics, take them as directed. Do not stop taking them just because you feel better. You need to take the full course of antibiotics. · Be safe with medicines. If your doctor prescribed medicine to decrease stomach acid, take it as directed.  Call your doctor if you think you are having a problem with your medicine. · Do not take any other medicine, including over-the-counter pain relievers, without talking to your doctor first.  · If your doctor recommends over-the-counter medicine to reduce stomach acid, such as Pepcid AC, Prilosec, Tagamet HB, or Zantac 75, follow the directions on the label. · Drink plenty of fluids (enough so that your urine is light yellow or clear like water) to prevent dehydration. Choose water and other caffeine-free clear liquids. If you have kidney, heart, or liver disease and have to limit fluids, talk with your doctor before you increase the amount of fluids you drink. · Limit how much alcohol you drink. · Avoid coffee, tea, cola drinks, chocolate, and other foods with caffeine. They increase stomach acid. When should you call for help? Call 911 anytime you think you may need emergency care. For example, call if:    · You vomit blood or what looks like coffee grounds.     · You pass maroon or very bloody stools.    Call your doctor now or seek immediate medical care if:    · You start breathing fast and have not produced urine in the last 8 hours.     · You cannot keep fluids down.    Watch closely for changes in your health, and be sure to contact your doctor if:    · You do not get better as expected. Where can you learn more? Go to http://javier-charly.info/. Enter [id number] in the search box to learn more about \"Gastritis: Care Instructions. \"  Current as of: March 27, 2018  Content Version: 11.9  © 0923-9388 [employer]. Care instructions adapted under license by [employer] (which disclaims liability or warranty for this information). If you have questions about a medical condition or this instruction, always ask your healthcare professional. [address] any warranty or liability for your use of this information.

## 2019-04-09 NOTE — PERIOP NOTES
TRANSFER - IN REPORT: 
 
Verbal report received from SD(name) on Km Nguyen  being received from for ordered procedure Report consisted of patients Situation, Background, Assessment and  
Recommendations(SBAR). Information from the following report(s) SBAR was reviewed with the receiving nurse. Opportunity for questions and clarification was provided. Assessment completed upon patients arrival to unit and care assumed. .Patient has been evaluated by anesthesia pre-procedure. Patient alert and oriented. Vital signs will not be charted by the Endoscopy nurse. All vitals, airway, and loc are monitored by anesthesia staff throughout procedure.   
 
 
.Endoscope was pre-cleaned at bedside immediately following procedure by YASEMIN

## 2019-04-09 NOTE — ANESTHESIA PREPROCEDURE EVALUATION
Relevant Problems   No relevant active problems       Anesthetic History     PONV          Review of Systems / Medical History  Patient summary reviewed, nursing notes reviewed and pertinent labs reviewed    Pulmonary                   Neuro/Psych       CVA  TIA     Cardiovascular    Hypertension              Exercise tolerance: >4 METS     GI/Hepatic/Renal     GERD           Endo/Other        Arthritis     Other Findings              Physical Exam    Airway  Mallampati: I  TM Distance: > 6 cm  Neck ROM: normal range of motion   Mouth opening: Normal     Cardiovascular    Rhythm: regular  Rate: normal         Dental  No notable dental hx       Pulmonary  Breath sounds clear to auscultation               Abdominal         Other Findings            Anesthetic Plan    ASA: 2  Anesthesia type: MAC          Induction: Intravenous  Anesthetic plan and risks discussed with: Patient

## 2019-04-09 NOTE — H&P
Pre-endoscopy H and P The patient was seen and examined in the endoscopy suite. The airway was assessed and docuemented. The problem list and medications were reviewed. Patient Active Problem List  
Diagnosis Code  Encounter for cosmetic surgery Z41.1  Transient cerebral ischemia G45.9  Chest pain R07.9  Dyslipidemia E78.5 Social History Socioeconomic History  Marital status:  Spouse name: Not on file  Number of children: Not on file  Years of education: Not on file  Highest education level: Not on file Occupational History  Not on file Social Needs  Financial resource strain: Not on file  Food insecurity:  
  Worry: Not on file Inability: Not on file  Transportation needs:  
  Medical: Not on file Non-medical: Not on file Tobacco Use  Smoking status: Never Smoker  Smokeless tobacco: Never Used Substance and Sexual Activity  Alcohol use: Not Currently Alcohol/week: 1.8 oz Types: 3 Glasses of wine per week  Drug use: No  
 Sexual activity: Not on file Lifestyle  Physical activity:  
  Days per week: Not on file Minutes per session: Not on file  Stress: Not on file Relationships  Social connections:  
  Talks on phone: Not on file Gets together: Not on file Attends Taoism service: Not on file Active member of club or organization: Not on file Attends meetings of clubs or organizations: Not on file Relationship status: Not on file  Intimate partner violence:  
  Fear of current or ex partner: Not on file Emotionally abused: Not on file Physically abused: Not on file Forced sexual activity: Not on file Other Topics Concern  Not on file Social History Narrative  Not on file Past Medical History:  
Diagnosis Date  Arthritis  Diverticulitis 2009 HAS HAD NO EPISODES SINCE COLOSTOMY REVERSAL  GERD (gastroesophageal reflux disease)  Hypertension   
 not currently on meds for HTN, was in past   
 Nausea & vomiting   
 motion sickness issues  Stroke [facility]) 2016 TIA ;possible association to prempro. Prior to Admission Medications Prescriptions Last Dose Informant Patient Reported? Taking? CYANOCOBALAMIN, VITAMIN B-12, (VITAMIN B-12 PO) 2019  Yes Yes Sig: Take 6,000 mcg by mouth daily. LACTOBACILLUS ACIDOPHILUS (PROBIOTIC PO) 2019  Yes Yes Sig: Take  by mouth. Chews 2 gummies po once daily. MULTIVITAMIN PO 2019  Yes Yes Sig: Take  by mouth. Chews 2 gummies po once daily. budesonide (RHINOCORT AQUA) 32 mcg/actuation nasal spray 2019 at Unknown time  Yes Yes Si Darling by Both Nostrils route daily. celecoxib (CELEBREX) 200 mg capsule 2019 at Unknown time  Yes Yes Sig: Take 200 mg by mouth daily. omeprazole (PRILOSEC) 20 mg capsule 2019 Self Yes Yes Sig: Take 20 mg by mouth daily. Indications: gastroesophageal reflux disease Facility-Administered Medications: None Chief complaint, history of present illness, and review of systems and Past medical History are positive for: epigastric pain, GERD and dysphagia The heart, lungs and mental status were satisfactory for the administration of sedation and for the procedure. I discussed with the patient the objectives, risks, consequences and alternatives to the procedure. Plan: Endoscopic procedure with sedation Maggie Espinosa MD  
2019 
8:19 AM

## 2019-04-26 ENCOUNTER — HOSPITAL ENCOUNTER (OUTPATIENT)
Dept: PREADMISSION TESTING | Age: 74
Discharge: HOME | End: 2019-04-26
Payer: MEDICARE

## 2019-04-26 VITALS
HEIGHT: 62 IN | TEMPERATURE: 97.5 F | DIASTOLIC BLOOD PRESSURE: 76 MMHG | BODY MASS INDEX: 26.34 KG/M2 | SYSTOLIC BLOOD PRESSURE: 146 MMHG | WEIGHT: 143.13 LBS | HEART RATE: 71 BPM

## 2019-04-26 LAB
ALBUMIN SERPL-MCNC: 3.4 G/DL (ref 3.5–5)
ALBUMIN/GLOB SERPL: 1.2 {RATIO} (ref 1.1–2.2)
ALP SERPL-CCNC: 91 U/L (ref 45–117)
ALT SERPL-CCNC: 25 U/L (ref 12–78)
ANION GAP SERPL CALC-SCNC: 7 MMOL/L (ref 5–15)
APPEARANCE UR: CLEAR
APTT PPP: 25.9 SEC (ref 22.1–32)
AST SERPL-CCNC: 16 U/L (ref 15–37)
BACTERIA URNS QL MICRO: NEGATIVE /HPF
BASOPHILS # BLD: 0.1 K/UL (ref 0–0.1)
BASOPHILS NFR BLD: 1 % (ref 0–1)
BILIRUB SERPL-MCNC: 0.3 MG/DL (ref 0.2–1)
BILIRUB UR QL CFM: NEGATIVE
BUN SERPL-MCNC: 18 MG/DL (ref 6–20)
BUN/CREAT SERPL: 25 (ref 12–20)
CALCIUM SERPL-MCNC: 8.7 MG/DL (ref 8.5–10.1)
CAOX CRY URNS QL MICRO: ABNORMAL
CHLORIDE SERPL-SCNC: 113 MMOL/L (ref 97–108)
CO2 SERPL-SCNC: 26 MMOL/L (ref 21–32)
COLOR UR: ABNORMAL
CREAT SERPL-MCNC: 0.73 MG/DL (ref 0.55–1.02)
DIFFERENTIAL METHOD BLD: NORMAL
EOSINOPHIL # BLD: 0.2 K/UL (ref 0–0.4)
EOSINOPHIL NFR BLD: 4 % (ref 0–7)
EPITH CASTS URNS QL MICRO: ABNORMAL /LPF
ERYTHROCYTE [DISTWIDTH] IN BLOOD BY AUTOMATED COUNT: 12.9 % (ref 11.5–14.5)
GLOBULIN SER CALC-MCNC: 2.8 G/DL (ref 2–4)
GLUCOSE SERPL-MCNC: 97 MG/DL (ref 65–100)
GLUCOSE UR STRIP.AUTO-MCNC: NEGATIVE MG/DL
HCT VFR BLD AUTO: 40.9 % (ref 35–47)
HGB BLD-MCNC: 13 G/DL (ref 11.5–16)
HGB UR QL STRIP: NEGATIVE
IMM GRANULOCYTES # BLD AUTO: 0 K/UL (ref 0–0.04)
IMM GRANULOCYTES NFR BLD AUTO: 0 % (ref 0–0.5)
INR PPP: 1 (ref 0.9–1.1)
KETONES UR QL STRIP.AUTO: NEGATIVE MG/DL
LEUKOCYTE ESTERASE UR QL STRIP.AUTO: NEGATIVE
LYMPHOCYTES # BLD: 1.2 K/UL (ref 0.8–3.5)
LYMPHOCYTES NFR BLD: 29 % (ref 12–49)
MCH RBC QN AUTO: 31 PG (ref 26–34)
MCHC RBC AUTO-ENTMCNC: 31.8 G/DL (ref 30–36.5)
MCV RBC AUTO: 97.4 FL (ref 80–99)
MONOCYTES # BLD: 0.4 K/UL (ref 0–1)
MONOCYTES NFR BLD: 10 % (ref 5–13)
MUCOUS THREADS URNS QL MICRO: ABNORMAL /LPF
NEUTS SEG # BLD: 2.4 K/UL (ref 1.8–8)
NEUTS SEG NFR BLD: 56 % (ref 32–75)
NITRITE UR QL STRIP.AUTO: NEGATIVE
NRBC # BLD: 0 K/UL (ref 0–0.01)
NRBC BLD-RTO: 0 PER 100 WBC
PH UR STRIP: 5.5 [PH] (ref 5–8)
PLATELET # BLD AUTO: 171 K/UL (ref 150–400)
PMV BLD AUTO: 11.3 FL (ref 8.9–12.9)
POTASSIUM SERPL-SCNC: 4.2 MMOL/L (ref 3.5–5.1)
PROT SERPL-MCNC: 6.2 G/DL (ref 6.4–8.2)
PROT UR STRIP-MCNC: NEGATIVE MG/DL
PROTHROMBIN TIME: 10.1 SEC (ref 9–11.1)
RBC # BLD AUTO: 4.2 M/UL (ref 3.8–5.2)
RBC #/AREA URNS HPF: ABNORMAL /HPF (ref 0–5)
SODIUM SERPL-SCNC: 146 MMOL/L (ref 136–145)
SP GR UR REFRACTOMETRY: >1.03 (ref 1–1.03)
THERAPEUTIC RANGE,PTTT: NORMAL SECS (ref 58–77)
UROBILINOGEN UR QL STRIP.AUTO: 1 EU/DL (ref 0.2–1)
WBC # BLD AUTO: 4.2 K/UL (ref 3.6–11)
WBC URNS QL MICRO: ABNORMAL /HPF (ref 0–4)

## 2019-04-26 PROCEDURE — 85610 PROTHROMBIN TIME: CPT

## 2019-04-26 PROCEDURE — 81001 URINALYSIS AUTO W/SCOPE: CPT

## 2019-04-26 PROCEDURE — 80053 COMPREHEN METABOLIC PANEL: CPT

## 2019-04-26 PROCEDURE — 85025 COMPLETE CBC W/AUTO DIFF WBC: CPT

## 2019-04-26 PROCEDURE — 36415 COLL VENOUS BLD VENIPUNCTURE: CPT

## 2019-04-26 PROCEDURE — 85730 THROMBOPLASTIN TIME PARTIAL: CPT

## 2019-04-26 PROCEDURE — 87086 URINE CULTURE/COLONY COUNT: CPT

## 2019-04-26 NOTE — PERIOP NOTES
"Anesthesia Transfer of Care Note    Patient: Susannah Xie    Procedure(s) Performed: Procedure(s) (LRB):  COLONOSCOPY (N/A)    Patient location: PACU    Anesthesia Type: general    Transport from OR: Transported from OR on 6-10 L/min O2 by face mask with adequate spontaneous ventilation    Post pain: adequate analgesia    Post assessment: no apparent anesthetic complications and tolerated procedure well    Post vital signs: stable    Level of consciousness: sedated and responds to stimulation    Nausea/Vomiting: no nausea/vomiting    Complications: none    Transfer of care protocol was followed      Last vitals:   Visit Vitals  BP (!) 98/53 (BP Location: Left arm, Patient Position: Lying)   Pulse 79   Temp 36.7 °C (98.1 °F) (Temporal)   Resp 14   Ht 5' 7" (1.702 m)   Wt 52.6 kg (116 lb)   SpO2 100%   Breastfeeding? No   BMI 18.17 kg/m²     " Pre-Operative Instructions DO NOT EAT OR DRINK ANYTHING AFTER MIDNIGHT THE NIGHT BEFORE SURGERY. Patient given surgical site infection information FAQs handout and hand hygiene tips sheet. Pre-operative instructions reviewed and patient verbalizes understanding of instructions. Patient has been given the opportunity to ask additional questions.

## 2019-04-28 LAB
BACTERIA SPEC CULT: NORMAL
CC UR VC: NORMAL
SERVICE CMNT-IMP: NORMAL

## 2019-05-07 ENCOUNTER — ANESTHESIA EVENT (OUTPATIENT)
Dept: SURGERY | Age: 74
End: 2019-05-07
Payer: MEDICARE

## 2019-05-07 ENCOUNTER — HOSPITAL ENCOUNTER (OUTPATIENT)
Age: 74
Setting detail: OUTPATIENT SURGERY
Discharge: HOME | End: 2019-05-07
Attending: OBSTETRICS & GYNECOLOGY | Admitting: OBSTETRICS & GYNECOLOGY
Payer: MEDICARE

## 2019-05-07 ENCOUNTER — ANESTHESIA (OUTPATIENT)
Dept: SURGERY | Age: 74
End: 2019-05-07
Payer: MEDICARE

## 2019-05-07 VITALS
RESPIRATION RATE: 14 BRPM | HEART RATE: 61 BPM | BODY MASS INDEX: 26.31 KG/M2 | DIASTOLIC BLOOD PRESSURE: 64 MMHG | OXYGEN SATURATION: 98 % | TEMPERATURE: 97.9 F | SYSTOLIC BLOOD PRESSURE: 157 MMHG | HEIGHT: 62 IN | WEIGHT: 143 LBS

## 2019-05-07 PROBLEM — N39.46 MIXED INCONTINENCE: Status: ACTIVE | Noted: 2019-05-07

## 2019-05-07 PROCEDURE — 76010000154 HC OR TIME FIRST 0.5 HR: Performed by: OBSTETRICS & GYNECOLOGY

## 2019-05-07 PROCEDURE — 77030018832 HC SOL IRR H20 ICUM -A: Performed by: OBSTETRICS & GYNECOLOGY

## 2019-05-07 PROCEDURE — 74011250636 HC RX REV CODE- 250/636

## 2019-05-07 PROCEDURE — 76060000032 HC ANESTHESIA 0.5 TO 1 HR: Performed by: OBSTETRICS & GYNECOLOGY

## 2019-05-07 PROCEDURE — 77030003539 HC NDL COAPTITE BSC -B: Performed by: OBSTETRICS & GYNECOLOGY

## 2019-05-07 PROCEDURE — 77030034696 HC CATH URETH FOL 2W BARD -A: Performed by: OBSTETRICS & GYNECOLOGY

## 2019-05-07 PROCEDURE — 77030018846 HC SOL IRR STRL H20 ICUM -A

## 2019-05-07 PROCEDURE — 77030019927 HC TBNG IRR CYSTO BAXT -A: Performed by: OBSTETRICS & GYNECOLOGY

## 2019-05-07 PROCEDURE — 77030020782 HC GWN BAIR PAWS FLX 3M -B

## 2019-05-07 PROCEDURE — 76210000021 HC REC RM PH II 0.5 TO 1 HR: Performed by: OBSTETRICS & GYNECOLOGY

## 2019-05-07 PROCEDURE — 74011250637 HC RX REV CODE- 250/637: Performed by: OBSTETRICS & GYNECOLOGY

## 2019-05-07 PROCEDURE — 76210000006 HC OR PH I REC 0.5 TO 1 HR: Performed by: OBSTETRICS & GYNECOLOGY

## 2019-05-07 PROCEDURE — L8606 SYNTHETIC IMPLNT URINARY 1ML: HCPCS | Performed by: OBSTETRICS & GYNECOLOGY

## 2019-05-07 PROCEDURE — 74011250636 HC RX REV CODE- 250/636: Performed by: ANESTHESIOLOGY

## 2019-05-07 PROCEDURE — 77030032490 HC SLV COMPR SCD KNE COVD -B: Performed by: OBSTETRICS & GYNECOLOGY

## 2019-05-07 DEVICE — AGENT INJ/SYR COAPTITE 1ML -- 890-300: Type: IMPLANTABLE DEVICE | Site: URETHRA | Status: FUNCTIONAL

## 2019-05-07 RX ORDER — PHENAZOPYRIDINE HYDROCHLORIDE 100 MG/1
200 TABLET, FILM COATED ORAL
Status: DISCONTINUED | OUTPATIENT
Start: 2019-05-07 | End: 2019-05-07 | Stop reason: HOSPADM

## 2019-05-07 RX ORDER — SODIUM CHLORIDE, SODIUM LACTATE, POTASSIUM CHLORIDE, CALCIUM CHLORIDE 600; 310; 30; 20 MG/100ML; MG/100ML; MG/100ML; MG/100ML
INJECTION, SOLUTION INTRAVENOUS
Status: DISCONTINUED | OUTPATIENT
Start: 2019-05-07 | End: 2019-05-07 | Stop reason: HOSPADM

## 2019-05-07 RX ORDER — PHENAZOPYRIDINE HYDROCHLORIDE 100 MG/1
TABLET, FILM COATED ORAL
Status: DISCONTINUED
Start: 2019-05-07 | End: 2019-05-07 | Stop reason: HOSPADM

## 2019-05-07 RX ORDER — MIDAZOLAM HYDROCHLORIDE 1 MG/ML
INJECTION, SOLUTION INTRAMUSCULAR; INTRAVENOUS AS NEEDED
Status: DISCONTINUED | OUTPATIENT
Start: 2019-05-07 | End: 2019-05-07 | Stop reason: HOSPADM

## 2019-05-07 RX ORDER — SODIUM CHLORIDE, SODIUM LACTATE, POTASSIUM CHLORIDE, CALCIUM CHLORIDE 600; 310; 30; 20 MG/100ML; MG/100ML; MG/100ML; MG/100ML
125 INJECTION, SOLUTION INTRAVENOUS CONTINUOUS
Status: DISCONTINUED | OUTPATIENT
Start: 2019-05-07 | End: 2019-05-07 | Stop reason: HOSPADM

## 2019-05-07 RX ORDER — SODIUM CHLORIDE, SODIUM LACTATE, POTASSIUM CHLORIDE, CALCIUM CHLORIDE 600; 310; 30; 20 MG/100ML; MG/100ML; MG/100ML; MG/100ML
25 INJECTION, SOLUTION INTRAVENOUS CONTINUOUS
Status: DISCONTINUED | OUTPATIENT
Start: 2019-05-07 | End: 2019-05-07 | Stop reason: HOSPADM

## 2019-05-07 RX ORDER — MIDAZOLAM HYDROCHLORIDE 1 MG/ML
1 INJECTION, SOLUTION INTRAMUSCULAR; INTRAVENOUS AS NEEDED
Status: DISCONTINUED | OUTPATIENT
Start: 2019-05-07 | End: 2019-05-07 | Stop reason: HOSPADM

## 2019-05-07 RX ORDER — ACETAMINOPHEN 10 MG/ML
INJECTION, SOLUTION INTRAVENOUS AS NEEDED
Status: DISCONTINUED | OUTPATIENT
Start: 2019-05-07 | End: 2019-05-07 | Stop reason: HOSPADM

## 2019-05-07 RX ORDER — SODIUM CHLORIDE 9 MG/ML
25 INJECTION, SOLUTION INTRAVENOUS CONTINUOUS
Status: DISCONTINUED | OUTPATIENT
Start: 2019-05-07 | End: 2019-05-07 | Stop reason: HOSPADM

## 2019-05-07 RX ORDER — FENTANYL CITRATE 50 UG/ML
25 INJECTION, SOLUTION INTRAMUSCULAR; INTRAVENOUS
Status: DISCONTINUED | OUTPATIENT
Start: 2019-05-07 | End: 2019-05-07 | Stop reason: HOSPADM

## 2019-05-07 RX ORDER — FAMOTIDINE 10 MG/ML
20 INJECTION INTRAVENOUS ONCE
Status: COMPLETED | OUTPATIENT
Start: 2019-05-07 | End: 2019-05-07

## 2019-05-07 RX ORDER — PHENAZOPYRIDINE HYDROCHLORIDE 200 MG/1
200 TABLET, FILM COATED ORAL
Qty: 20 TAB | Refills: 0 | Status: SHIPPED | OUTPATIENT
Start: 2019-05-07 | End: 2019-05-10

## 2019-05-07 RX ORDER — ONDANSETRON 2 MG/ML
4 INJECTION INTRAMUSCULAR; INTRAVENOUS AS NEEDED
Status: DISCONTINUED | OUTPATIENT
Start: 2019-05-07 | End: 2019-05-07 | Stop reason: HOSPADM

## 2019-05-07 RX ORDER — SODIUM CHLORIDE 0.9 % (FLUSH) 0.9 %
5-40 SYRINGE (ML) INJECTION AS NEEDED
Status: DISCONTINUED | OUTPATIENT
Start: 2019-05-07 | End: 2019-05-07 | Stop reason: HOSPADM

## 2019-05-07 RX ORDER — FENTANYL CITRATE 50 UG/ML
50 INJECTION, SOLUTION INTRAMUSCULAR; INTRAVENOUS AS NEEDED
Status: DISCONTINUED | OUTPATIENT
Start: 2019-05-07 | End: 2019-05-07 | Stop reason: HOSPADM

## 2019-05-07 RX ORDER — ONDANSETRON 2 MG/ML
INJECTION INTRAMUSCULAR; INTRAVENOUS AS NEEDED
Status: DISCONTINUED | OUTPATIENT
Start: 2019-05-07 | End: 2019-05-07 | Stop reason: HOSPADM

## 2019-05-07 RX ORDER — DIPHENHYDRAMINE HYDROCHLORIDE 50 MG/ML
12.5 INJECTION, SOLUTION INTRAMUSCULAR; INTRAVENOUS AS NEEDED
Status: DISCONTINUED | OUTPATIENT
Start: 2019-05-07 | End: 2019-05-07 | Stop reason: HOSPADM

## 2019-05-07 RX ORDER — PROPOFOL 10 MG/ML
INJECTION, EMULSION INTRAVENOUS AS NEEDED
Status: DISCONTINUED | OUTPATIENT
Start: 2019-05-07 | End: 2019-05-07 | Stop reason: HOSPADM

## 2019-05-07 RX ORDER — LOSARTAN POTASSIUM 25 MG/1
25 TABLET ORAL DAILY
COMMUNITY
End: 2020-06-09

## 2019-05-07 RX ORDER — PANTOPRAZOLE SODIUM 20 MG/1
40 TABLET, DELAYED RELEASE ORAL DAILY
COMMUNITY
End: 2021-07-27

## 2019-05-07 RX ORDER — LIDOCAINE HYDROCHLORIDE 10 MG/ML
0.1 INJECTION, SOLUTION EPIDURAL; INFILTRATION; INTRACAUDAL; PERINEURAL AS NEEDED
Status: DISCONTINUED | OUTPATIENT
Start: 2019-05-07 | End: 2019-05-07 | Stop reason: HOSPADM

## 2019-05-07 RX ORDER — HYDROMORPHONE HYDROCHLORIDE 1 MG/ML
0.2 INJECTION, SOLUTION INTRAMUSCULAR; INTRAVENOUS; SUBCUTANEOUS
Status: DISCONTINUED | OUTPATIENT
Start: 2019-05-07 | End: 2019-05-07 | Stop reason: HOSPADM

## 2019-05-07 RX ORDER — SODIUM CHLORIDE 0.9 % (FLUSH) 0.9 %
5-40 SYRINGE (ML) INJECTION EVERY 8 HOURS
Status: DISCONTINUED | OUTPATIENT
Start: 2019-05-07 | End: 2019-05-07 | Stop reason: HOSPADM

## 2019-05-07 RX ORDER — MIDAZOLAM HYDROCHLORIDE 1 MG/ML
0.5 INJECTION, SOLUTION INTRAMUSCULAR; INTRAVENOUS
Status: DISCONTINUED | OUTPATIENT
Start: 2019-05-07 | End: 2019-05-07 | Stop reason: HOSPADM

## 2019-05-07 RX ORDER — PROPOFOL 10 MG/ML
INJECTION, EMULSION INTRAVENOUS
Status: DISCONTINUED | OUTPATIENT
Start: 2019-05-07 | End: 2019-05-07 | Stop reason: HOSPADM

## 2019-05-07 RX ADMIN — FAMOTIDINE 20 MG: 10 INJECTION, SOLUTION INTRAVENOUS at 13:24

## 2019-05-07 RX ADMIN — PROPOFOL 80 MG: 10 INJECTION, EMULSION INTRAVENOUS at 14:09

## 2019-05-07 RX ADMIN — FENTANYL CITRATE 25 MCG: 50 INJECTION INTRAMUSCULAR; INTRAVENOUS at 15:00

## 2019-05-07 RX ADMIN — FENTANYL CITRATE 25 MCG: 50 INJECTION INTRAMUSCULAR; INTRAVENOUS at 14:46

## 2019-05-07 RX ADMIN — MIDAZOLAM HYDROCHLORIDE 2 MG: 1 INJECTION, SOLUTION INTRAMUSCULAR; INTRAVENOUS at 13:51

## 2019-05-07 RX ADMIN — SODIUM CHLORIDE, SODIUM LACTATE, POTASSIUM CHLORIDE, CALCIUM CHLORIDE: 600; 310; 30; 20 INJECTION, SOLUTION INTRAVENOUS at 13:51

## 2019-05-07 RX ADMIN — PHENAZOPYRIDINE 200 MG: 100 TABLET ORAL at 15:17

## 2019-05-07 RX ADMIN — PROPOFOL 60 MG: 10 INJECTION, EMULSION INTRAVENOUS at 14:11

## 2019-05-07 RX ADMIN — PROPOFOL 80 MG: 10 INJECTION, EMULSION INTRAVENOUS at 14:02

## 2019-05-07 RX ADMIN — PROPOFOL 30 MCG/KG/MIN: 10 INJECTION, EMULSION INTRAVENOUS at 14:02

## 2019-05-07 RX ADMIN — ACETAMINOPHEN 1000 MG: 10 INJECTION, SOLUTION INTRAVENOUS at 14:10

## 2019-05-07 RX ADMIN — PROPOFOL 80 MG: 10 INJECTION, EMULSION INTRAVENOUS at 13:59

## 2019-05-07 RX ADMIN — SODIUM CHLORIDE, SODIUM LACTATE, POTASSIUM CHLORIDE, AND CALCIUM CHLORIDE 25 ML/HR: 600; 310; 30; 20 INJECTION, SOLUTION INTRAVENOUS at 12:42

## 2019-05-07 RX ADMIN — PROPOFOL 40 MG: 10 INJECTION, EMULSION INTRAVENOUS at 14:00

## 2019-05-07 RX ADMIN — PROPOFOL 30 MG: 10 INJECTION, EMULSION INTRAVENOUS at 13:51

## 2019-05-07 RX ADMIN — PROPOFOL 50 MG: 10 INJECTION, EMULSION INTRAVENOUS at 14:06

## 2019-05-07 RX ADMIN — LIDOCAINE HYDROCHLORIDE 0.1 ML: 10 INJECTION, SOLUTION EPIDURAL; INFILTRATION; INTRACAUDAL; PERINEURAL at 12:43

## 2019-05-07 RX ADMIN — ONDANSETRON 4 MG: 2 INJECTION INTRAMUSCULAR; INTRAVENOUS at 14:10

## 2019-05-07 NOTE — DISCHARGE INSTRUCTIONS
Take it easy today and limit lifting. Expect some mild blood in urine and burning with urination. Stay hydrated. Call if unable to void or if you feel like you have a UTI and are unable to empty completely. Nothing per vagina for 48 hours. Patient Education        Cystoscopy: What to Expect at [facility]    A cystoscopy is a procedure that lets a doctor look inside of the bladder and the urethra. The urethra is the tube that carries urine from the bladder to outside the body. The doctor uses a thin, lighted tool called a cystoscope. Your bladder is filled with fluid. This stretches the bladder so that your doctor can look closely at the inside of your bladder. After the cystoscopy, your urethra may be sore at first, and it may burn when you urinate for the first few days after the procedure. You may feel the need to urinate more often, and your urine may be pink. These symptoms should get better in 1 or 2 days. You will probably be able to go back to most of your usual activities in 1 or 2 days. This care sheet gives you a general idea about how long it will take for you to recover. But each person recovers at a different pace. Follow the steps below to get better as quickly as possible. How can you care for yourself at home? Activity    · Rest when you feel tired. Getting enough sleep will help you recover.     · Try to walk each day. Start by walking a little more than you did the day before. Bit by bit, increase the amount you walk. Walking boosts blood flow and helps prevent pneumonia and constipation.     · Avoid strenuous activities, such as bicycle riding, jogging, weight lifting, or aerobic exercise, until your doctor says it is okay.     · Ask your doctor when you can drive again.     · Most people are able to return to work within 1 or 2 days after the procedure.     · You may shower and take baths as usual.     · Ask your doctor when it is okay for you to have sex.    Diet    · You can eat your normal diet. If your stomach is upset, try bland, low-fat foods like plain rice, broiled chicken, toast, and yogurt.     · Drink plenty of fluids (unless your doctor tells you not to). Medicines    · Take pain medicines exactly as directed. ? If the doctor gave you a prescription medicine for pain, take it as prescribed. ? If you are not taking a prescription pain medicine, ask your doctor if you can take an over-the-counter medicine.     · If you think your pain medicine is making you sick to your stomach:  ? Take your medicine after meals (unless your doctor has told you not to). ? Ask your doctor for a different pain medicine.     · If your doctor prescribed antibiotics, take them as directed. Do not stop taking them just because you feel better. You need to take the full course of antibiotics. Follow-up care is a key part of your treatment and safety. Be sure to make and go to all appointments, and call your doctor if you are having problems. It's also a good idea to know your test results and keep a list of the medicines you take. When should you call for help? Call 911 anytime you think you may need emergency care. For example, call if:    · You passed out (lost consciousness).     · You have severe trouble breathing.     · You have sudden chest pain and shortness of breath, or you cough up blood.     · You have severe belly pain.    Call your doctor now or seek immediate medical care if:    · You are sick to your stomach or cannot keep fluids down.     · Your urine is still red or you see blood clots after you have urinated several times.     · You have trouble passing urine or stool, especially if you have pain or swelling in your lower belly.     · You have signs of a blood clot, such as:  ? Pain in your calf, back of the knee, thigh, or groin. ?  Redness and swelling in your leg or groin.     · You develop a fever or severe chills.     · You have pain in your back just below your rib cage. This is called flank pain.    Watch closely for changes in your health, and be sure to contact your doctor if:    · You have pain or burning when you urinate. A burning feeling is normal for a day or two after the test, but call if it does not get better.     · You have a frequent urge to urinate but can pass only small amounts of urine.     · Your urine is pink, red, or cloudy, or smells bad. It is normal for the urine to have a pinkish color for a few days after the test, but call if it does not get better. Where can you learn more? Go to http://javier-charly.info/. Enter N469 in the search box to learn more about \"Cystoscopy: What to Expect at Home. \"  Current as of: March 20, 2018  Content Version: 11.9  © 6098-9984 [employer]. Care instructions adapted under license by [employer] (which disclaims liability or warranty for this information). If you have questions about a medical condition or this instruction, always ask your healthcare professional. [address] any warranty or liability for your use of this information. ______________________________________________________________________    Anesthesia Discharge Instructions    After general anesthesia or intervenous sedation, for 24 hours or while taking prescription Narcotics:  · Limit your activities  · Do not drive or operate hazardous machinery  · If you have not urinated within 8 hours after discharge, please contact your surgeon on call.   · Do not make important personal or business decisions  · Do not drink alcoholic beverages    Report the following to your surgeon:  · Excessive pain, swelling, redness or odor of or around the surgical area  · Temperature over 100.5 degrees  · Nausea and vomiting lasting longer than 4 hours or if unable to take medication  · Any signs of decreased circulation or nerve impairment to extremity:  Change in color, persistent numbness, tingling, coldness or increased pain.   · Any questions

## 2019-05-07 NOTE — ANESTHESIA POSTPROCEDURE EVALUATION
Post-Anesthesia Evaluation and Assessment Patient: Joan Blanco MRN: [id number]  SSN: [id number] YOB: 1945  Age: 68 y.o. Sex: female Cardiovascular Function/Vital Signs Visit Vitals /64 Pulse (!) 59 Temp 36.4 °C (97.6 °F) Resp 18 Ht 5' 2\" (1.575 m) Wt 64.9 kg (143 lb) SpO2 100% BMI 26.16 kg/m² Patient is status post MAC anesthesia for Procedure(s): URETHRAL BULKING AND CYSTOSCOPY. Nausea/Vomiting: None Postoperative hydration reviewed and adequate. Pain: 
Pain Scale 1: (\"urethra is burning\", \"but tolerable\") (05/07/19 1426) Pain Intensity 1: 0 (05/07/19 1221) Managed Neurological Status:  
Neuro (WDL): (sleepy) (05/07/19 1426) Neuro LUE Motor Response: Purposeful (05/07/19 1426) LLE Motor Response: Purposeful (05/07/19 1426) RUE Motor Response: Purposeful (05/07/19 1426) RLE Motor Response: Purposeful (05/07/19 1426) At baseline Mental Status and Level of Consciousness: Alert and oriented to person, place, and time Pulmonary Status:  
O2 Device: Nasal cannula (05/07/19 1426) Adequate oxygenation and airway patent Complications related to anesthesia: None Post-anesthesia assessment completed. No concerns Signed By: Paris Paige MD   
 May 7, 2019 Procedure(s): URETHRAL BULKING AND CYSTOSCOPY. MAC 
 
<BSHSIANPOST> Vitals Value Taken Time /64 5/7/2019  2:45 PM  
Temp 36.4 °C (97.6 °F) 5/7/2019  2:26 PM  
Pulse 55 5/7/2019  2:50 PM  
Resp 11 5/7/2019  2:50 PM  
SpO2 100 % 5/7/2019  2:50 PM  
Vitals shown include unvalidated device data.

## 2019-05-07 NOTE — BRIEF OP NOTE
BRIEF OPERATIVE NOTE    Date of Procedure: 5/7/2019   Preoperative Diagnosis: STRESS INCONTINENCE  Postoperative Diagnosis: STRESS INCONTINENCE    Procedure(s):  URETHRAL BULKING AND CYSTOSCOPY  Surgeon(s) and Role:     Uche Courtney MD - Primary         Surgical Assistant: SA rosaura Loya    Surgical Staff:  Circ-1: Eric Flynn RN  Scrub RN-1: Beatriz Martínez  Scrub RN-2: Arturo Nuno RN  Event Time In Time Out   Incision Start 1406    Incision Close 1414      Anesthesia: MAC   Estimated Blood Loss: 1  Specimens: * No specimens in log *   Findings: normal urethra and bladder, nice post operative coaptation   Complications: none  Implants:   Implant Name Type Inv.  Item Serial No.  Lot No. LRB No. Used Action   AGENT INJ/SYR COAPTITE 1ML -- 890-300 - SN/A Other AGENT INJ/SYR COAPTITE 1ML -- 890-300 N/A [facility] [id number] N/A 1 Implanted   AGENT INJ/SYR COAPTITE 1ML -- 890-300 - SN/A Other AGENT INJ/SYR COAPTITE 1ML -- 890-300 N/A [facility]-WOMENS [facility] [id number] N/A 1 Implanted

## 2019-05-07 NOTE — PERIOP NOTES
0830 Spoke with patient via phone per Dr. Promise Sifuentes. Arrival time requested change  for 10:30  1100 Patient and family updated on the delay in case start time.

## 2019-05-07 NOTE — OP NOTES
POSTOPERATIVE DIAGNOSES:  stress incontinence. PREOPERATIVE DIAGNOSES: stress incontinence. PROCEDURES PERFORMED:  Coaptite urethral bulking, cystoscopy. SURGEON:  Russ Moreno MD    ASSISTANT:  Surgical assistant x1    ANESTHESIA:  MAC    ESTIMATED BLOOD LOSS:  1 mL. FINDINGS: stress urinary incontinence, normal urethra and bladder, normal post procedure cystoscopy and nice urethral coaptation    Minimal hematuria    COMPLICATIONS:  None. IMPLANTS:  Coaptite injectable material 2.0 mL. SPECIMENS REMOVED:  none    INDICATION:  This is a louise 66-year-old female with mixed urinary incontinence refractory to conservative measures who is on an OAB medication who has RUCHI with movement and demonstrated on UD testing who desired surgical management but not a sling at this time. Patient was taken to the operating room after the risks and benefits were again reviewed with her and she desired to proceed. She was placed under MAC anesthesia without any difficulty and placed in the high supine lithotomy position in candy cane stirrups with careful attention to upper and lower extremity positioning to avoid joint, muscle or nerve injury. She was prepped and draped in normal sterile fashion after timeout was performed and clipping was performed. I did a 30-degree rigid cystoscopy, which revealed no bladder injury, no urethral injury and bilateral ureteral orifice efflux. With a 30-degree scope and a sidekick needle I primed my needle and then at the 4 o'clock, 8 o'clock positions injected approximately 1 of a mL with nice vascular blanching, no disruption of the urethral mucosa and nice coaptation. There was only mild hematuria. The Stanford catheter was replaced. Sponge, lap and needle counts were correct x2.

## 2019-05-07 NOTE — PERIOP NOTES
Patient: Irina Hoskins MRN: [id number]  SSN: [id number]   YOB: 1945  Age: 68 y.o. Sex: female     Patient is status post Procedure(s):  URETHRAL BULKING AND CYSTOSCOPY.     Surgeon(s) and Role:     * Ronny Rocha MD - Primary    Local/Dose/Irrigation:  N/A                  Peripheral IV 05/07/19 Left Hand (Active)                           Dressing/Packing:     N/A  Splint/Cast:  ]

## 2020-03-05 ENCOUNTER — HOSPITAL ENCOUNTER (OUTPATIENT)
Dept: PREADMISSION TESTING | Age: 75
Discharge: HOME | End: 2020-03-05
Payer: MEDICARE

## 2020-03-05 VITALS
TEMPERATURE: 97.6 F | SYSTOLIC BLOOD PRESSURE: 118 MMHG | HEIGHT: 62 IN | BODY MASS INDEX: 26.87 KG/M2 | DIASTOLIC BLOOD PRESSURE: 72 MMHG | WEIGHT: 146 LBS | HEART RATE: 54 BPM

## 2020-03-05 LAB
APPEARANCE UR: CLEAR
BACTERIA URNS QL MICRO: NEGATIVE /HPF
BILIRUB UR QL: NEGATIVE
COLOR UR: ABNORMAL
EPITH CASTS URNS QL MICRO: ABNORMAL /LPF
GLUCOSE UR STRIP.AUTO-MCNC: NEGATIVE MG/DL
HGB UR QL STRIP: NEGATIVE
KETONES UR QL STRIP.AUTO: ABNORMAL MG/DL
LEUKOCYTE ESTERASE UR QL STRIP.AUTO: NEGATIVE
MUCOUS THREADS URNS QL MICRO: ABNORMAL /LPF
NITRITE UR QL STRIP.AUTO: NEGATIVE
PH UR STRIP: 5.5 [PH] (ref 5–8)
PROT UR STRIP-MCNC: NEGATIVE MG/DL
RBC #/AREA URNS HPF: ABNORMAL /HPF (ref 0–5)
SP GR UR REFRACTOMETRY: 1.02 (ref 1–1.03)
UA: UC IF INDICATED,UAUC: ABNORMAL
UROBILINOGEN UR QL STRIP.AUTO: 0.2 EU/DL (ref 0.2–1)
WBC URNS QL MICRO: ABNORMAL /HPF (ref 0–4)

## 2020-03-05 PROCEDURE — 93005 ELECTROCARDIOGRAM TRACING: CPT

## 2020-03-05 PROCEDURE — 87086 URINE CULTURE/COLONY COUNT: CPT

## 2020-03-05 PROCEDURE — 81001 URINALYSIS AUTO W/SCOPE: CPT

## 2020-03-06 LAB
ATRIAL RATE: 56 BPM
BACTERIA SPEC CULT: NORMAL
CALCULATED P AXIS, ECG09: 44 DEGREES
CALCULATED R AXIS, ECG10: 18 DEGREES
CALCULATED T AXIS, ECG11: 41 DEGREES
DIAGNOSIS, 93000: NORMAL
P-R INTERVAL, ECG05: 156 MS
Q-T INTERVAL, ECG07: 434 MS
QRS DURATION, ECG06: 84 MS
QTC CALCULATION (BEZET), ECG08: 418 MS
SERVICE CMNT-IMP: NORMAL
VENTRICULAR RATE, ECG03: 56 BPM

## 2020-06-09 ENCOUNTER — HOSPITAL ENCOUNTER (OUTPATIENT)
Dept: PREADMISSION TESTING | Age: 75
Discharge: HOME | End: 2020-06-09
Payer: SELF-PAY

## 2020-06-09 VITALS
HEART RATE: 74 BPM | RESPIRATION RATE: 18 BRPM | SYSTOLIC BLOOD PRESSURE: 119 MMHG | DIASTOLIC BLOOD PRESSURE: 66 MMHG | HEIGHT: 62 IN | WEIGHT: 144.5 LBS | TEMPERATURE: 98.2 F | BODY MASS INDEX: 26.59 KG/M2

## 2020-06-09 LAB
ANION GAP SERPL CALC-SCNC: 7 MMOL/L (ref 5–15)
BASOPHILS # BLD: 0.1 K/UL (ref 0–0.1)
BASOPHILS NFR BLD: 2 % (ref 0–1)
BUN SERPL-MCNC: 22 MG/DL (ref 6–20)
BUN/CREAT SERPL: 26 (ref 12–20)
CALCIUM SERPL-MCNC: 8.6 MG/DL (ref 8.5–10.1)
CHLORIDE SERPL-SCNC: 108 MMOL/L (ref 97–108)
CO2 SERPL-SCNC: 26 MMOL/L (ref 21–32)
CREAT SERPL-MCNC: 0.84 MG/DL (ref 0.55–1.02)
DIFFERENTIAL METHOD BLD: ABNORMAL
EOSINOPHIL # BLD: 0.6 K/UL (ref 0–0.4)
EOSINOPHIL NFR BLD: 13 % (ref 0–7)
ERYTHROCYTE [DISTWIDTH] IN BLOOD BY AUTOMATED COUNT: 12.5 % (ref 11.5–14.5)
GLUCOSE SERPL-MCNC: 89 MG/DL (ref 65–100)
HCT VFR BLD AUTO: 42.2 % (ref 35–47)
HGB BLD-MCNC: 13.6 G/DL (ref 11.5–16)
IMM GRANULOCYTES # BLD AUTO: 0 K/UL (ref 0–0.04)
IMM GRANULOCYTES NFR BLD AUTO: 0 % (ref 0–0.5)
LYMPHOCYTES # BLD: 1.2 K/UL (ref 0.8–3.5)
LYMPHOCYTES NFR BLD: 27 % (ref 12–49)
MCH RBC QN AUTO: 30.5 PG (ref 26–34)
MCHC RBC AUTO-ENTMCNC: 32.2 G/DL (ref 30–36.5)
MCV RBC AUTO: 94.6 FL (ref 80–99)
MONOCYTES # BLD: 0.5 K/UL (ref 0–1)
MONOCYTES NFR BLD: 11 % (ref 5–13)
NEUTS SEG # BLD: 2.1 K/UL (ref 1.8–8)
NEUTS SEG NFR BLD: 47 % (ref 32–75)
NRBC # BLD: 0 K/UL (ref 0–0.01)
NRBC BLD-RTO: 0 PER 100 WBC
PLATELET # BLD AUTO: 179 K/UL (ref 150–400)
PMV BLD AUTO: 11.1 FL (ref 8.9–12.9)
POTASSIUM SERPL-SCNC: 4.2 MMOL/L (ref 3.5–5.1)
RBC # BLD AUTO: 4.46 M/UL (ref 3.8–5.2)
SODIUM SERPL-SCNC: 141 MMOL/L (ref 136–145)
WBC # BLD AUTO: 4.5 K/UL (ref 3.6–11)

## 2020-06-09 PROCEDURE — 80048 BASIC METABOLIC PNL TOTAL CA: CPT

## 2020-06-09 PROCEDURE — 85025 COMPLETE CBC W/AUTO DIFF WBC: CPT

## 2020-06-09 RX ORDER — VALSARTAN AND HYDROCHLOROTHIAZIDE 160; 12.5 MG/1; MG/1
1 TABLET, FILM COATED ORAL DAILY
COMMUNITY

## 2020-06-19 ENCOUNTER — HOSPITAL ENCOUNTER (OUTPATIENT)
Dept: PREADMISSION TESTING | Age: 75
Discharge: HOME | End: 2020-06-19
Attending: NURSE PRACTITIONER
Payer: MEDICARE

## 2020-06-19 DIAGNOSIS — Z20.822 ENCOUNTER FOR LABORATORY TESTING FOR COVID-19 VIRUS: ICD-10-CM

## 2020-06-19 PROCEDURE — 87635 SARS-COV-2 COVID-19 AMP PRB: CPT

## 2020-06-20 LAB — SARS-COV-2, COV2NT: NOT DETECTED

## 2020-06-22 ENCOUNTER — ANESTHESIA EVENT (OUTPATIENT)
Dept: MEDSURG UNIT | Age: 75
End: 2020-06-22
Payer: SELF-PAY

## 2020-06-22 RX ORDER — CLINDAMYCIN PHOSPHATE 600 MG/50ML
600 INJECTION INTRAVENOUS ONCE
Status: CANCELLED | OUTPATIENT
Start: 2020-06-23 | End: 2020-06-23

## 2020-06-22 RX ORDER — LIDOCAINE HYDROCHLORIDE AND EPINEPHRINE 10; 10 MG/ML; UG/ML
30 INJECTION, SOLUTION INFILTRATION; PERINEURAL ONCE
Status: CANCELLED | OUTPATIENT
Start: 2020-06-23 | End: 2020-06-23

## 2020-06-22 NOTE — H&P
Steve Carrasco  : 1945  DOS: 2020    Chief Complaint: Consultation       Allergies:   codeine , morphine , penicillin       Medications: Turmeric 500 mg oral capsule , Protonix 20 mg oral delayed release tablet , losartan-hydrochlorothiazide 100 mg-25 mg oral tablet       Medical History: Height: 5' 2\", Weight: 138 lbs     Pulmonary System: Negative  Cardiac System: Negative  Blood and Liver Systems: High Blood Pressure, History TIA  Neurologic and Endocrine Systems: Negative  GI,  and Reproductive Systems: Negative  Cancer: Negative   Surgical History: L knee meniscotomy  ,  colostomy secondary to ruptured colon, subsequently reversed ,  lower facelift       Family History: High Blood Pressure  , Cancer       Social History: Smoking Status  4 Never smoker    Pregnancy Number of pregnancies: 2     Number of children:2     Alcohol Use Drinks per week: 1-2                MsAlphonso Eden is a pleasant 79-year-old female who is a previous elective facial rejuvenation patient. She presents today with a desire to address changes to her eyes and the soft tissues around her eyes through additional elective surgery. She feels as though her eyes are beginning to look smaller, especially when she opens her eyes. She does not feel as though her upper eyelids open all the way, but not to the point where her vision is obstructed. In addition, she is not happy with the appearance to the skin and the fullness in the lower eyelids. She underwent a previous elective upper lid blepharoplasty 30 years ago. She denies any visual disturbance today, or excessive tearing or dry eye symptoms. Review of systems reveals normal heart and lung sounds. Examination of her upper lids demonstrates rather deep set and hollow upper eyelids without any significant hooding or dermatochalasis. She does demonstrate blepharoptosis with a slightly greater degree of ptosis of the right eyelid.   This difference is approximately 1-2 mm but is obvious. When she opens her eyes, the excursion goes to approximately midway between the pupil and the limbus. When she accommodates by elevating her eyebrow, the upper eyelids will elevate a few more millimeters. The lower lids demonstrate some pseudo-herniation of orbital fat and fine crêpe paper wrinkles and a course surface to the skin tone of the lower lids. She does not demonstrate any lid malposition and the snap back test is consistent with her age of 76, approximately 1-2 seconds. I had a discussion with Ms. Franklin Murray regarding procedures to address her issues and concerns. Although she does demonstrate some brow ptosis, and there is a little bit of skin laxity in the lateral upper lids near the canthus, she does not demonstrate any significant upper lid hooding or dermatochalasis. She does have blepharoptosis which could be addressed through an open external levator plication. This is performed under a general anesthetic on an outpatient basis. I diagrammed on paper where an upper lid incision is made to expose the orbicularis muscle which is transected and preserved. This gains access to the vertically oriented levator palpebral superioris muscle juncture to the tarsal plate of the upper eyelid. Fine permanent silk sutures are placed in a mattress fashion to plicate and shorten the length of the levator muscle, so that with elevation the upper lid will elevate a few millimeters more. Our efforts will insure 1-2 mm greater lid excursion on her right compared to the left side. The upper eyelid incision is then closed with a fine Prolene suture left in place for 1 week. We also discussed standard transcutaneous lower lid blepharoplasty through a sub ciliary incision a few millimeters below the eyelash line. There is a skin only flap elevation to the infraorbital rim, transection of a small attenuated piece of preseptal orbicularis muscle gaining access through the orbital septum. There is conservative fat reduction, a lateral canthopexy suture to support the tissues to the lateral orbital periosteum, and conservative skin resection. The incision is then closed with a Prolene suture as well. Patients are discharged with medications for pain, antibiotics, nausea medicine, and TobraDex eye drops. At some 2-3 weeks after her blepharoplasty and ptosis repair, I would recommend she undergo fractional erbium laser resurfacing to the tissues on and around the lower eyelid and the canthus. Patients utilize epidermal repair cream or Aquaphor and the lasered areas re-epithelialize within 7 days. Patients then utilize a moisturizing cream with a sunscreen for the next 6 months. The risks and complications include but are not limited to infection, pain, bleeding, hematomas requiring re-operation, skin sensitivity changes, vascular compromise to tissues resulting in partial or complete loss of tissues, resultant open wounds, the need for long term wound care and dressing changes and scarring, irregularities and asymmetries requiring touch-up and revision surgery, an unacceptable cosmetic result, and other things including cardiac and pulmonary risks that include death. Additional surgical risks include dry eye syndrome, ectropion or scleral show, retrobulbar hematoma and potential blindness, and other risks that may require surgery for correction. Her questions were answered and pictures were taken. Shell meet with Carley to discuss the fees. The patient was counseled about the risks of nawaf Covid-19 during their perioperative period and any recovery window from their procedure. The patient was made aware that nawaf Covid-19 may worsen their prognosis for recovering from their procedure and lend to a higher morbidity and/or mortality risk. All material risks, benefits, and reasonable alternatives including postponing the procedure were discussed.  The patient DOES wish to proceed with their procedure at this time. Betty Gonzaelz M.D.  FACS

## 2020-06-23 ENCOUNTER — ANESTHESIA (OUTPATIENT)
Dept: MEDSURG UNIT | Age: 75
End: 2020-06-23
Payer: SELF-PAY

## 2020-06-23 ENCOUNTER — HOSPITAL ENCOUNTER (OUTPATIENT)
Age: 75
Setting detail: OUTPATIENT SURGERY
Discharge: HOME | End: 2020-06-23
Attending: SURGERY | Admitting: SURGERY
Payer: SELF-PAY

## 2020-06-23 VITALS
RESPIRATION RATE: 16 BRPM | TEMPERATURE: 98.4 F | OXYGEN SATURATION: 96 % | SYSTOLIC BLOOD PRESSURE: 132 MMHG | WEIGHT: 144 LBS | BODY MASS INDEX: 26.34 KG/M2 | HEART RATE: 95 BPM | DIASTOLIC BLOOD PRESSURE: 56 MMHG

## 2020-06-23 PROCEDURE — 77030011640 HC PAD GRND REM COVD -A: Performed by: SURGERY

## 2020-06-23 PROCEDURE — 77030040361 HC SLV COMPR DVT MDII -B: Performed by: SURGERY

## 2020-06-23 PROCEDURE — 74011250637 HC RX REV CODE- 250/637: Performed by: ANESTHESIOLOGY

## 2020-06-23 PROCEDURE — 77030002987 HC SUT PROL J&J -B: Performed by: SURGERY

## 2020-06-23 PROCEDURE — 74011000250 HC RX REV CODE- 250: Performed by: SURGERY

## 2020-06-23 PROCEDURE — 77030002888 HC SUT CHRMC J&J -A: Performed by: SURGERY

## 2020-06-23 PROCEDURE — 77030013908 HC PROTCT CRNL CRCH B&L -A: Performed by: SURGERY

## 2020-06-23 PROCEDURE — 77030002996 HC SUT SLK J&J -A: Performed by: SURGERY

## 2020-06-23 PROCEDURE — 76030000005 HC AMB SURG OR TIME 2.5 TO 3: Performed by: SURGERY

## 2020-06-23 PROCEDURE — 76060000065 HC AMB SURG ANES 2.5 TO 3 HR: Performed by: SURGERY

## 2020-06-23 PROCEDURE — 74011250636 HC RX REV CODE- 250/636: Performed by: NURSE ANESTHETIST, CERTIFIED REGISTERED

## 2020-06-23 PROCEDURE — 77030026438 HC STYL ET INTUB CARD -A: Performed by: ANESTHESIOLOGY

## 2020-06-23 PROCEDURE — 76210000035 HC AMBSU PH I REC 1 TO 1.5 HR: Performed by: SURGERY

## 2020-06-23 PROCEDURE — 77030008684 HC TU ET CUF COVD -B: Performed by: ANESTHESIOLOGY

## 2020-06-23 PROCEDURE — 74011000250 HC RX REV CODE- 250: Performed by: NURSE ANESTHETIST, CERTIFIED REGISTERED

## 2020-06-23 PROCEDURE — 77030013629 HC ELECTRD NDL STRY -B: Performed by: SURGERY

## 2020-06-23 PROCEDURE — 77030018836 HC SOL IRR NACL ICUM -A: Performed by: SURGERY

## 2020-06-23 PROCEDURE — 74011250636 HC RX REV CODE- 250/636: Performed by: SURGERY

## 2020-06-23 PROCEDURE — 77030031139 HC SUT VCRL2 J&J -A: Performed by: SURGERY

## 2020-06-23 PROCEDURE — 77030040922 HC BLNKT HYPOTHRM STRY -A

## 2020-06-23 RX ORDER — SODIUM CHLORIDE 0.9 % (FLUSH) 0.9 %
5-40 SYRINGE (ML) INJECTION AS NEEDED
Status: DISCONTINUED | OUTPATIENT
Start: 2020-06-23 | End: 2020-06-23 | Stop reason: HOSPADM

## 2020-06-23 RX ORDER — GLYCOPYRROLATE 0.2 MG/ML
INJECTION INTRAMUSCULAR; INTRAVENOUS AS NEEDED
Status: DISCONTINUED | OUTPATIENT
Start: 2020-06-23 | End: 2020-06-23 | Stop reason: HOSPADM

## 2020-06-23 RX ORDER — DEXMEDETOMIDINE HYDROCHLORIDE 100 UG/ML
INJECTION, SOLUTION INTRAVENOUS AS NEEDED
Status: DISCONTINUED | OUTPATIENT
Start: 2020-06-23 | End: 2020-06-23 | Stop reason: HOSPADM

## 2020-06-23 RX ORDER — FENTANYL CITRATE 50 UG/ML
25 INJECTION, SOLUTION INTRAMUSCULAR; INTRAVENOUS
Status: DISCONTINUED | OUTPATIENT
Start: 2020-06-23 | End: 2020-06-23 | Stop reason: HOSPADM

## 2020-06-23 RX ORDER — MIDAZOLAM HYDROCHLORIDE 1 MG/ML
1 INJECTION, SOLUTION INTRAMUSCULAR; INTRAVENOUS AS NEEDED
Status: DISCONTINUED | OUTPATIENT
Start: 2020-06-23 | End: 2020-06-23 | Stop reason: HOSPADM

## 2020-06-23 RX ORDER — LIDOCAINE HYDROCHLORIDE 20 MG/ML
INJECTION, SOLUTION EPIDURAL; INFILTRATION; INTRACAUDAL; PERINEURAL AS NEEDED
Status: DISCONTINUED | OUTPATIENT
Start: 2020-06-23 | End: 2020-06-23 | Stop reason: HOSPADM

## 2020-06-23 RX ORDER — FENTANYL CITRATE 50 UG/ML
50 INJECTION, SOLUTION INTRAMUSCULAR; INTRAVENOUS AS NEEDED
Status: DISCONTINUED | OUTPATIENT
Start: 2020-06-23 | End: 2020-06-23 | Stop reason: HOSPADM

## 2020-06-23 RX ORDER — ONDANSETRON 2 MG/ML
4 INJECTION INTRAMUSCULAR; INTRAVENOUS AS NEEDED
Status: DISCONTINUED | OUTPATIENT
Start: 2020-06-23 | End: 2020-06-23 | Stop reason: HOSPADM

## 2020-06-23 RX ORDER — DEXAMETHASONE SODIUM PHOSPHATE 4 MG/ML
INJECTION, SOLUTION INTRA-ARTICULAR; INTRALESIONAL; INTRAMUSCULAR; INTRAVENOUS; SOFT TISSUE AS NEEDED
Status: DISCONTINUED | OUTPATIENT
Start: 2020-06-23 | End: 2020-06-23 | Stop reason: HOSPADM

## 2020-06-23 RX ORDER — ROCURONIUM BROMIDE 10 MG/ML
INJECTION, SOLUTION INTRAVENOUS AS NEEDED
Status: DISCONTINUED | OUTPATIENT
Start: 2020-06-23 | End: 2020-06-23 | Stop reason: HOSPADM

## 2020-06-23 RX ORDER — BACITRACIN ZINC AND POLYMYXIN B SULFATE 500; 10000 [USP'U]/G; [USP'U]/G
OINTMENT OPHTHALMIC AS NEEDED
Status: DISCONTINUED | OUTPATIENT
Start: 2020-06-23 | End: 2020-06-23 | Stop reason: HOSPADM

## 2020-06-23 RX ORDER — SODIUM CHLORIDE, SODIUM LACTATE, POTASSIUM CHLORIDE, CALCIUM CHLORIDE 600; 310; 30; 20 MG/100ML; MG/100ML; MG/100ML; MG/100ML
50 INJECTION, SOLUTION INTRAVENOUS CONTINUOUS
Status: DISCONTINUED | OUTPATIENT
Start: 2020-06-23 | End: 2020-06-23 | Stop reason: HOSPADM

## 2020-06-23 RX ORDER — EPHEDRINE SULFATE/0.9% NACL/PF 50 MG/5 ML
SYRINGE (ML) INTRAVENOUS AS NEEDED
Status: DISCONTINUED | OUTPATIENT
Start: 2020-06-23 | End: 2020-06-23 | Stop reason: HOSPADM

## 2020-06-23 RX ORDER — ACETAMINOPHEN 325 MG/1
650 TABLET ORAL ONCE
Status: COMPLETED | OUTPATIENT
Start: 2020-06-23 | End: 2020-06-23

## 2020-06-23 RX ORDER — LIDOCAINE HYDROCHLORIDE 10 MG/ML
0.1 INJECTION, SOLUTION EPIDURAL; INFILTRATION; INTRACAUDAL; PERINEURAL AS NEEDED
Status: DISCONTINUED | OUTPATIENT
Start: 2020-06-23 | End: 2020-06-23 | Stop reason: HOSPADM

## 2020-06-23 RX ORDER — SUCCINYLCHOLINE CHLORIDE 20 MG/ML
INJECTION INTRAMUSCULAR; INTRAVENOUS AS NEEDED
Status: DISCONTINUED | OUTPATIENT
Start: 2020-06-23 | End: 2020-06-23 | Stop reason: HOSPADM

## 2020-06-23 RX ORDER — SODIUM CHLORIDE 0.9 % (FLUSH) 0.9 %
5-40 SYRINGE (ML) INJECTION EVERY 8 HOURS
Status: DISCONTINUED | OUTPATIENT
Start: 2020-06-23 | End: 2020-06-23 | Stop reason: HOSPADM

## 2020-06-23 RX ORDER — HYDROMORPHONE HYDROCHLORIDE 1 MG/ML
0.2 INJECTION, SOLUTION INTRAMUSCULAR; INTRAVENOUS; SUBCUTANEOUS
Status: DISCONTINUED | OUTPATIENT
Start: 2020-06-23 | End: 2020-06-23 | Stop reason: HOSPADM

## 2020-06-23 RX ORDER — ONDANSETRON 2 MG/ML
INJECTION INTRAMUSCULAR; INTRAVENOUS AS NEEDED
Status: DISCONTINUED | OUTPATIENT
Start: 2020-06-23 | End: 2020-06-23 | Stop reason: HOSPADM

## 2020-06-23 RX ORDER — PROPOFOL 10 MG/ML
INJECTION, EMULSION INTRAVENOUS AS NEEDED
Status: DISCONTINUED | OUTPATIENT
Start: 2020-06-23 | End: 2020-06-23 | Stop reason: HOSPADM

## 2020-06-23 RX ORDER — LIDOCAINE HYDROCHLORIDE AND EPINEPHRINE 10; 10 MG/ML; UG/ML
INJECTION, SOLUTION INFILTRATION; PERINEURAL AS NEEDED
Status: DISCONTINUED | OUTPATIENT
Start: 2020-06-23 | End: 2020-06-23 | Stop reason: HOSPADM

## 2020-06-23 RX ORDER — SODIUM CHLORIDE, SODIUM LACTATE, POTASSIUM CHLORIDE, CALCIUM CHLORIDE 600; 310; 30; 20 MG/100ML; MG/100ML; MG/100ML; MG/100ML
INJECTION, SOLUTION INTRAVENOUS
Status: DISCONTINUED | OUTPATIENT
Start: 2020-06-23 | End: 2020-06-23 | Stop reason: HOSPADM

## 2020-06-23 RX ORDER — PHENYLEPHRINE HCL IN 0.9% NACL 0.4MG/10ML
SYRINGE (ML) INTRAVENOUS AS NEEDED
Status: DISCONTINUED | OUTPATIENT
Start: 2020-06-23 | End: 2020-06-23 | Stop reason: HOSPADM

## 2020-06-23 RX ADMIN — Medication 10 MG: at 12:52

## 2020-06-23 RX ADMIN — PROPOFOL 200 MG: 10 INJECTION, EMULSION INTRAVENOUS at 12:06

## 2020-06-23 RX ADMIN — Medication 200 MCG: at 12:37

## 2020-06-23 RX ADMIN — ACETAMINOPHEN 650 MG: 325 TABLET ORAL at 10:56

## 2020-06-23 RX ADMIN — GLYCOPYRROLATE 0.2 MG: 0.2 INJECTION, SOLUTION INTRAMUSCULAR; INTRAVENOUS at 13:09

## 2020-06-23 RX ADMIN — Medication 10 MG: at 12:55

## 2020-06-23 RX ADMIN — LIDOCAINE HYDROCHLORIDE 80 MG: 20 INJECTION, SOLUTION EPIDURAL; INFILTRATION; INTRACAUDAL; PERINEURAL at 12:06

## 2020-06-23 RX ADMIN — DEXMEDETOMIDINE HYDROCHLORIDE 10 MCG: 100 INJECTION, SOLUTION, CONCENTRATE INTRAVENOUS at 12:22

## 2020-06-23 RX ADMIN — DEXMEDETOMIDINE HYDROCHLORIDE 10 MCG: 100 INJECTION, SOLUTION, CONCENTRATE INTRAVENOUS at 12:12

## 2020-06-23 RX ADMIN — Medication 200 MCG: at 12:34

## 2020-06-23 RX ADMIN — SUCCINYLCHOLINE CHLORIDE 120 MG: 20 INJECTION, SOLUTION INTRAMUSCULAR; INTRAVENOUS at 12:06

## 2020-06-23 RX ADMIN — Medication 120 MCG: at 12:31

## 2020-06-23 RX ADMIN — DEXAMETHASONE SODIUM PHOSPHATE 8 MG: 4 INJECTION, SOLUTION INTRAMUSCULAR; INTRAVENOUS at 12:15

## 2020-06-23 RX ADMIN — ROCURONIUM BROMIDE 5 MG: 10 SOLUTION INTRAVENOUS at 12:06

## 2020-06-23 RX ADMIN — Medication 10 MG: at 12:37

## 2020-06-23 RX ADMIN — Medication 80 MCG: at 12:29

## 2020-06-23 RX ADMIN — DEXAMETHASONE SODIUM PHOSPHATE 2 MG: 4 INJECTION, SOLUTION INTRAMUSCULAR; INTRAVENOUS at 14:24

## 2020-06-23 RX ADMIN — SODIUM CHLORIDE, POTASSIUM CHLORIDE, SODIUM LACTATE AND CALCIUM CHLORIDE: 600; 310; 30; 20 INJECTION, SOLUTION INTRAVENOUS at 11:56

## 2020-06-23 RX ADMIN — ONDANSETRON HYDROCHLORIDE 4 MG: 2 INJECTION, SOLUTION INTRAMUSCULAR; INTRAVENOUS at 14:22

## 2020-06-23 NOTE — ROUTINE PROCESS
Patient: Maurilio Nash MRN: [id number]  SSN: [id number]   YOB: 1945  Age: 76 y.o. Sex: female     Patient is status post Procedure(s):  BILATERAL UPPER AND LOWER LID BLEPHAROPLASTY. Surgeon(s) and Role:     * Sandra Atkins MD - Primary    Local/Dose/Irrigation:see intra op meds                Peripheral IV 06/23/20 Left Arm (Active)            Airway - Endotracheal Tube 06/23/20 Oral (Active)                   Dressing/Packing:  Wound Eye Right-Dressing Type: Adhesive wound closure strips (Steri-Strips); Adhesive wound dressing (Mastisol) (06/23/20 1100)  Wound Eye Left-Dressing Type: Adhesive wound closure strips (Steri-Strips); Adhesive wound dressing (Mastisol) (06/23/20 1100)    Splint/Cast:  ]    Other:

## 2020-06-23 NOTE — ANESTHESIA POSTPROCEDURE EVALUATION
Post-Anesthesia Evaluation and Assessment    Patient: Ellie Gonzales MRN: [id number]  SSN: [id number]    YOB: 1945  Age: 76 y.o. Sex: female      I have evaluated the patient and they are stable and ready for discharge from the PACU. Cardiovascular Function/Vital Signs  Visit Vitals  /51 (BP 1 Location: Right arm, BP Patient Position: At rest)   Pulse 100   Temp 36.9 °C (98.4 °F)   Resp 18   Wt 65.3 kg (144 lb)   SpO2 96%   BMI 26.34 kg/m²       Patient is status post General anesthesia for Procedure(s):  BILATERAL UPPER AND LOWER LID BLEPHAROPLASTY. Nausea/Vomiting: None    Postoperative hydration reviewed and adequate. Pain:  Pain Scale 1: Numeric (0 - 10) (06/23/20 1441)  Pain Intensity 1: 0 (06/23/20 1441)   Managed    Neurological Status:   Neuro (WDL): Within Defined Limits (06/23/20 1441)   At baseline    Mental Status, Level of Consciousness: Alert and  oriented to person, place, and time    Pulmonary Status:   O2 Device: Room air (06/23/20 1441)   Adequate oxygenation and airway patent    Complications related to anesthesia: None    Post-anesthesia assessment completed. No concerns    Signed By: Hugo Stuart MD     June 23, 2020              Procedure(s):  BILATERAL UPPER AND LOWER LID BLEPHAROPLASTY. general    <BSHSIANPOST>    INITIAL Post-op Vital signs:   Vitals Value Taken Time   /56 6/23/2020  2:45 PM   Temp 36.9 °C (98.4 °F) 6/23/2020  2:41 PM   Pulse 106 6/23/2020  2:50 PM   Resp 20 6/23/2020  2:50 PM   SpO2 96 % 6/23/2020  2:50 PM   Vitals shown include unvalidated device data.

## 2020-06-23 NOTE — INTERVAL H&P NOTE
Update History & Physical    The Patient's History and Physical of June 2, 2020,   It was reviewed with the patient and I examined the patient. There was no change. The surgical site was confirmed by the patient and me. Plan:  The risk, benefits, expected outcome, and alternative to the recommended procedure have been discussed with the patient. Patient understands and wants to proceed with the procedure.     Electronically signed by Edmond Wahl MD on 6/23/2020 at 11:55 AM

## 2020-06-23 NOTE — DISCHARGE INSTRUCTIONS
Leave the steri strips on the eyes ON until follow up appointment  Resume any pre op medications and diet  Keep head elevated at all times at least 30 degrees, do not lay flat for about 2 weeks  Use ice cold gauze squares in ice water and keep over eyes as much as possible over next 3 days. Use tupperware bowl from kitchen, ice from freezer, water from sink, and keep gauze squared in the ice water and change out every 5-10 minutes while awake. You do not need to do this all night when sleeping. Use stool softeners to prevent constipation  Do not take pain medications on an empty stomach  NO bending, straining, lifting, or exertion for 4 weeks  May resume daily shower in 2 days. Call DR Kath Vicente at [phone] (cell) for questions or problems  Anticipate a phone call from DR. Kath reynolds      Patient Education      Scopolamine (Transderm Scop) - (Absorbed through the skin)   Why this medicine is used:   Treat nausea and vomiting. Contact a nurse or doctor right away if you have:  · Seeing, hearing, or feeling things that are not there  · Confusion or memory loss  · Lightheadedness, dizziness, drowsiness, or fainting  · Blurred vision  · Trouble urinating     Common side effects:  · Skin rash or redness  · Restlessness or tiredness  · Dry mouth  © 2017 [address] is for End User's use only and may not be sold, redistributed or otherwise used for commercial purposes.

## 2020-06-23 NOTE — ANESTHESIA PREPROCEDURE EVALUATION
Relevant Problems   No relevant active problems       Anesthetic History     PONV          Review of Systems / Medical History  Patient summary reviewed, nursing notes reviewed and pertinent labs reviewed    Pulmonary  Within defined limits                 Neuro/Psych         TIA     Cardiovascular    Hypertension                   GI/Hepatic/Renal     GERD           Endo/Other        Arthritis     Other Findings              Physical Exam    Airway  Mallampati: II  TM Distance: > 6 cm  Neck ROM: normal range of motion   Mouth opening: Normal     Cardiovascular  Regular rate and rhythm,  S1 and S2 normal,  no murmur, click, rub, or gallop             Dental  No notable dental hx       Pulmonary  Breath sounds clear to auscultation               Abdominal  GI exam deferred       Other Findings            Anesthetic Plan    ASA: 2  Anesthesia type: general          Induction: Intravenous  Anesthetic plan and risks discussed with: Patient

## 2020-06-23 NOTE — BRIEF OP NOTE
Brief Postoperative Note    Patient: Matteo Verdin  YOB: 1945  MRN: [id number]    Date of Procedure: 6/23/2020     Pre-Op Diagnosis: COSMETIC    Post-Op Diagnosis: Same as preoperative diagnosis.       Procedure(s):  BILATERAL UPPER AND LOWER LID BLEPHAROPLASTY    Surgeon(s):  Melonie Maloney MD    Surgical Assistant: None    Anesthesia: General     Estimated Blood Loss (mL): Minimal    Complications: None    Specimens: * No specimens in log *     Implants: * No implants in log *    Drains: * No LDAs found *    Findings: scarring indicative of previous eyelid surgery    Electronically Signed by Luigi Major MD on 6/23/2020 at 2:34 PM

## 2020-06-24 NOTE — OP NOTES
[address]   OPERATIVE REPORT    Name:  Leon Duarte  MR#:  [id number]  :  1945  ACCOUNT #:  [de-identified]  DATE OF SERVICE:  2020      PREOPERATIVE DIAGNOSES:  Personal history of previous elective facial rejuvenation, desires secondary facial rejuvenation (lower lid blepharoplasty), bilateral upper lid blepharoptosis. POSTOPERATIVE DIAGNOSES:  Personal history of previous elective facial rejuvenation, desires secondary facial rejuvenation (lower lid blepharoplasty), bilateral upper lid blepharoptosis. PROCEDURES PERFORMED:  Secondary transcutaneous lower lid blepharoplasty with fat reduction, lateral canthopexy suture, bilateral upper lid levator plication. SURGEON:  Kevin Joshi MD    ASSISTANTS:  Lori Moreno. STAFF:  OR staff, room #374, [facility] [address]:  General.    COMPLICATIONS:  None. SPECIMENS REMOVED:  Skin and fat periorbital palpebral tissue negligible, not weighed, discarded. IMPLANTS:  None. ESTIMATED BLOOD LOSS:  Approximately 5 mL. IV FLUIDS:  Approximately 1500 mL. DRAINS:  None. INDICATIONS FOR PROCEDURE:  The patient is a pleasant 45-year-old female who has a history of previous facial rejuvenation including face lifting and previous eyelid surgery. Over the last couple of years, she has seen some changes to the lower eyelids including crepe papaer wrinkles and poor tissue tone and some fullness consistent with pseudoherniation of orbital fat. She was interested in repeating her lower lid blepharoplasty from a procedure that was done a number of years ago. In addition on exam, she was found to have 2-3 mm of blepharoptosis bilaterally, with full excursion, the upper lid did not reach the limbus resulting in about 2-3 mm of ptosis. She was felt to be an acceptable candidate to treat the ptosis by means of a levator plication.   She comes in today for these combined bilateral elective procedures. OPERATIVE PROCEDURE:  After appropriate consent was obtained, preoperative markings were placed. She was taken to the operating room and placed on the operative table in supine position. Satisfactory general endotracheal anesthesia was obtained. SCD devices were placed per routine. Our local anesthesia solution was injected around each eye based on our markings and her face was sterilely prepped and draped. Corneal shield and protectors were placed into each eye with lubrication prior to the beginning of the procedure. I began with the patient's right side making an approximately 1.5-2 cm incision, a few millimeters above the tarsal fold with a #15 scalpel blade and continuing in the curvilinear manner with the aid of double skin hooks with the electrocautery with the [place] tip on a low setting. The soft tissue was then spread and pulled away to try to reveal the levator palpebrae superioris, vertical fibrous attachment to the aponeurosis to the tarsal plate of the upper lid. At this point, calipers were used to identify approximately 6 mm apart 3 locations for mattress sutures. One was directly in midline with the pupil and approximately 5 mm on either side of the mid pupillary line. A 6-0 silk suture was used to place the horizontal mattress sutures and these were tied over a small mosquito to ensure no entrapment of soft tissue. These were tied individual beginning medially to laterally. Once we were satisfied with the plication, the small skin wound was then closed with a 7-0 Prolene in a running fashion. We then turned our attention to the lower lids and a #15 scalpel blade was used to make a small 5-8 mm downward incision within the lateral canthus and then the incision was continued in a medial direction with the curved iris scissors about 2 mm below the eyelash line. Care was taken to stop our incision a few millimeters lateral to the inferior punctum of the lower eyelid.   A 4-mm double skin hook was used to place separate tension on the lower lid and the skin onlay flap was elevated with the iris scissors down to approximately the level of the infraorbital rim. At this point, a 3 mm segment of attenuated preseptal orbicularis muscle was transected with the scissors, gaining access to the orbital septum. An attempted fat reduction was performed bilaterally, although there was a significantly greater degree of orbital fat on the left lower eyelid. Care was taken to avoid injury to obvious neurovascular structures or any periorbital muscular structures. Small amount of hemostasis was required. At this point, a 4-mm skin hook was then used to pull laterally near the canthus to expose the lateral orbital periosteum. A 6-0 Vicryl was used to place the lateral canthopexy suture in the lateral orbital periosteum and a healthy purchase of the lower lateral orbicularis muscle. The skin was then conservatively draped and trimmed to cut away and the wound was closed with 7-0 Prolene in simple interrupted fashion x3 in the canthus in a running fashion throughout the remainder of the lower lids. The corneal shield and protectors were removed and eyes were irrigated with basic salt solution. A 1/4-inch Steri-Strips and Mastisol were placed over the ends of the Prolene sutures and ice-cold gauze was placed over the eyes. At the end of the bilateral procedures, sponge and needle counts were reported as correct. She was awakened, extubated, and transferred to the recovery room in satisfactory and stable condition. She will be discharged home today in care of her family with prescriptions and instructions and will follow up with me within 1 week.         MD KATHERIN [email or web address]  D:  06/23/2020 14:46  T:  06/24/2020 1:06  JOB #:  [id number]  CC:  Suzette Miller MD

## 2020-08-07 ENCOUNTER — HOSPITAL ENCOUNTER (OUTPATIENT)
Dept: PREADMISSION TESTING | Age: 75
Discharge: HOME | End: 2020-08-07
Payer: MEDICARE

## 2020-08-07 DIAGNOSIS — U07.1 COVID-19: ICD-10-CM

## 2020-08-07 PROCEDURE — 87635 SARS-COV-2 COVID-19 AMP PRB: CPT

## 2020-08-08 LAB — SARS-COV-2, COV2NT: NOT DETECTED

## 2020-08-10 NOTE — H&P
Lis Bautista  : 1945  DOS: 2020    Chief Complaint: Consultation       Allergies: codeine , morphine , penicillin       Medications: Turmeric 500 mg oral capsule , Protonix 20 mg oral delayed release tablet , losartan-hydrochlorothiazide 100 mg-25 mg oral tablet       Medical History: Height: 5' 2\", Weight: 138 lbs    Pulmonary System: Negative  Cardiac System: high blood pressure  Blood and Liver Systems: TIA  Neurologic and Endocrine Systems: Negative  GI,  and Reproductive Systems: Negative  Cancer: Negative   Surgical History: L knee meniscotomy  ,  colostomy secondary to ruptured colon, subsequently reversed ,  lower facelift       Family History: High Blood Pressure, Cancer       Social History: Smoking Status: non smoker    Pregnancy Number of pregnancies: 2     Number of children:2     Alcohol Use Drinks per week: 1-2            Ms. Suhas Sands is now 3 weeks postop from bilateral lower lid blepharoplasty and upper lid ptosis repair. She has been followed weekly with left lower lid ectropion and malposition. She has worked by her report vigorously at lower lid massage and stretching in a cephalic direction with more emphasis on her left. She also feels, and has been confirmed on her exam, that the right upper eyelid still does not come up as far as the left side. She is unhappy about this visible postoperative asymmetry and is concerned about the tightness and malposition on the left lower lid. Review of systems reveals normal heart and lung sounds. Examination confirms some residual blepharoptosis on the right of at least 2-3 mm. The left upper lid elevates to the limbus of the iris and looks acceptable. She still has a trace amount of swelling bilaterally but I do not believe this is affecting her excursion. Examination of the left lower eyelid however still confirms ectropion with the tight tissues and malposition of at least 3 mm.   The right lower eyelid is in the normal position and does not demonstrate any malposition or scleral show. I had a discussion with Ms. Shiloh Callaway regarding the healing issues on the left, and she is interested in addressing this surgically versus continued lower lid massage. She understands this would be performed under a general anesthetic on an outpatient basis. On the left side on the lower lid, we will go through her existing healing incisional wound, and release the healing fibrous tissue through the orbicularis muscle and the orbital septum. A small full-thickness skin graft would be harvested from the left upper lateral lid and placed within the lower lid tissues to help eliminate the tension, malposition, and ectropion. In addition, a Frost/tarsorrhaphy suture is placed to help provide upper tension and aid in the correction of the malposition and tension. On the right side, we would utilize again her recent healing upper lid incisional wound, expose the levator mechanism, and place additional plication sutures to shorten the length and increase excursion. The risks and complications include but are not limited to infection, pain, bleeding, hematomas requiring re-operation, skin sensitivity changes, vascular compromise to tissues resulting in partial or complete loss of tissues, resultant open wounds, the need for long term wound care and dressing changes and scarring, irregularities and asymmetries requiring touch-up and revision surgery, an unacceptable cosmetic result, and other things including cardiac and pulmonary risks that include death. Her questions were answered, pictures are taken, and we will waive any surgeons fees for these procedures but she understands that she is responsible for anesthesia and facility fee. The patient was counseled about the risks of nawaf Covid-19 during their perioperative period and any recovery window from their procedure.  The patient was made aware that nawaf Covid-19 may worsen their prognosis for recovering from their procedure and lend to a higher morbidity and/or mortality risk. All material risks, benefits, and reasonable alternatives including postponing the procedure were discussed. The patient DOES wish to proceed with their procedure at this time. Hurshel Heimlich, M.D.  FACS

## 2020-08-11 ENCOUNTER — ANESTHESIA (OUTPATIENT)
Dept: MEDSURG UNIT | Age: 75
End: 2020-08-11
Payer: SELF-PAY

## 2020-08-11 ENCOUNTER — HOSPITAL ENCOUNTER (OUTPATIENT)
Age: 75
Setting detail: OUTPATIENT SURGERY
Discharge: HOME | End: 2020-08-11
Attending: SURGERY | Admitting: SURGERY
Payer: SELF-PAY

## 2020-08-11 ENCOUNTER — ANESTHESIA EVENT (OUTPATIENT)
Dept: MEDSURG UNIT | Age: 75
End: 2020-08-11
Payer: SELF-PAY

## 2020-08-11 VITALS
BODY MASS INDEX: 26.13 KG/M2 | SYSTOLIC BLOOD PRESSURE: 153 MMHG | HEART RATE: 94 BPM | DIASTOLIC BLOOD PRESSURE: 78 MMHG | RESPIRATION RATE: 20 BRPM | HEIGHT: 62 IN | OXYGEN SATURATION: 96 % | WEIGHT: 142 LBS | TEMPERATURE: 97.7 F

## 2020-08-11 PROCEDURE — 74011250636 HC RX REV CODE- 250/636: Performed by: ANESTHESIOLOGY

## 2020-08-11 PROCEDURE — 76060000064 HC AMB SURG ANES 2 TO 2.5 HR: Performed by: SURGERY

## 2020-08-11 PROCEDURE — 74011250636 HC RX REV CODE- 250/636: Performed by: NURSE ANESTHETIST, CERTIFIED REGISTERED

## 2020-08-11 PROCEDURE — 77030013079 HC BLNKT BAIR HGGR 3M -A: Performed by: NURSE ANESTHETIST, CERTIFIED REGISTERED

## 2020-08-11 PROCEDURE — 74011000250 HC RX REV CODE- 250: Performed by: NURSE ANESTHETIST, CERTIFIED REGISTERED

## 2020-08-11 PROCEDURE — 77030040922 HC BLNKT HYPOTHRM STRY -A

## 2020-08-11 PROCEDURE — 74011250637 HC RX REV CODE- 250/637: Performed by: ANESTHESIOLOGY

## 2020-08-11 PROCEDURE — 74011000250 HC RX REV CODE- 250: Performed by: SURGERY

## 2020-08-11 PROCEDURE — 77030011640 HC PAD GRND REM COVD -A: Performed by: SURGERY

## 2020-08-11 PROCEDURE — 77030026438 HC STYL ET INTUB CARD -A: Performed by: NURSE ANESTHETIST, CERTIFIED REGISTERED

## 2020-08-11 PROCEDURE — 77030002916 HC SUT ETHLN J&J -A: Performed by: SURGERY

## 2020-08-11 PROCEDURE — 76210000037 HC AMBSU PH I REC 2 TO 2.5 HR: Performed by: SURGERY

## 2020-08-11 PROCEDURE — 77030002987 HC SUT PROL J&J -B: Performed by: SURGERY

## 2020-08-11 PROCEDURE — 77030008684 HC TU ET CUF COVD -B: Performed by: NURSE ANESTHETIST, CERTIFIED REGISTERED

## 2020-08-11 PROCEDURE — 74011250636 HC RX REV CODE- 250/636: Performed by: SURGERY

## 2020-08-11 PROCEDURE — 77030013908 HC PROTCT CRNL CRCH B&L -A: Performed by: SURGERY

## 2020-08-11 PROCEDURE — 76030000004 HC AMB SURG OR TIME 2 TO 2.5: Performed by: SURGERY

## 2020-08-11 PROCEDURE — 77030040361 HC SLV COMPR DVT MDII -B: Performed by: SURGERY

## 2020-08-11 PROCEDURE — 77030013629 HC ELECTRD NDL STRY -B: Performed by: SURGERY

## 2020-08-11 PROCEDURE — 77030018836 HC SOL IRR NACL ICUM -A: Performed by: SURGERY

## 2020-08-11 PROCEDURE — 74011250637 HC RX REV CODE- 250/637: Performed by: NURSE ANESTHETIST, CERTIFIED REGISTERED

## 2020-08-11 PROCEDURE — 77030002996 HC SUT SLK J&J -A: Performed by: SURGERY

## 2020-08-11 RX ORDER — SODIUM CHLORIDE, SODIUM LACTATE, POTASSIUM CHLORIDE, CALCIUM CHLORIDE 600; 310; 30; 20 MG/100ML; MG/100ML; MG/100ML; MG/100ML
125 INJECTION, SOLUTION INTRAVENOUS CONTINUOUS
Status: DISCONTINUED | OUTPATIENT
Start: 2020-08-11 | End: 2020-08-11 | Stop reason: HOSPADM

## 2020-08-11 RX ORDER — LIDOCAINE HYDROCHLORIDE 20 MG/ML
INJECTION, SOLUTION EPIDURAL; INFILTRATION; INTRACAUDAL; PERINEURAL AS NEEDED
Status: DISCONTINUED | OUTPATIENT
Start: 2020-08-11 | End: 2020-08-11 | Stop reason: HOSPADM

## 2020-08-11 RX ORDER — LIDOCAINE HYDROCHLORIDE AND EPINEPHRINE 10; 10 MG/ML; UG/ML
INJECTION, SOLUTION INFILTRATION; PERINEURAL AS NEEDED
Status: DISCONTINUED | OUTPATIENT
Start: 2020-08-11 | End: 2020-08-11 | Stop reason: HOSPADM

## 2020-08-11 RX ORDER — MIDAZOLAM HYDROCHLORIDE 1 MG/ML
0.5 INJECTION, SOLUTION INTRAMUSCULAR; INTRAVENOUS
Status: DISCONTINUED | OUTPATIENT
Start: 2020-08-11 | End: 2020-08-11 | Stop reason: HOSPADM

## 2020-08-11 RX ORDER — PROPOFOL 10 MG/ML
INJECTION, EMULSION INTRAVENOUS AS NEEDED
Status: DISCONTINUED | OUTPATIENT
Start: 2020-08-11 | End: 2020-08-11 | Stop reason: HOSPADM

## 2020-08-11 RX ORDER — NEOMYCIN AND POLYMYXIN B SULFATES AND BACITRACIN ZINC 400; 3.5; 1 [USP'U]/G; MG/G; [USP'U]/G
OINTMENT OPHTHALMIC AS NEEDED
Status: DISCONTINUED | OUTPATIENT
Start: 2020-08-11 | End: 2020-08-11 | Stop reason: HOSPADM

## 2020-08-11 RX ORDER — ONDANSETRON 2 MG/ML
4 INJECTION INTRAMUSCULAR; INTRAVENOUS AS NEEDED
Status: DISCONTINUED | OUTPATIENT
Start: 2020-08-11 | End: 2020-08-11 | Stop reason: HOSPADM

## 2020-08-11 RX ORDER — LIDOCAINE HYDROCHLORIDE 10 MG/ML
0.1 INJECTION, SOLUTION EPIDURAL; INFILTRATION; INTRACAUDAL; PERINEURAL AS NEEDED
Status: DISCONTINUED | OUTPATIENT
Start: 2020-08-11 | End: 2020-08-11 | Stop reason: HOSPADM

## 2020-08-11 RX ORDER — SODIUM CHLORIDE 0.9 % (FLUSH) 0.9 %
5-40 SYRINGE (ML) INJECTION EVERY 8 HOURS
Status: DISCONTINUED | OUTPATIENT
Start: 2020-08-11 | End: 2020-08-11 | Stop reason: HOSPADM

## 2020-08-11 RX ORDER — HYDROMORPHONE HYDROCHLORIDE 1 MG/ML
0.2 INJECTION, SOLUTION INTRAMUSCULAR; INTRAVENOUS; SUBCUTANEOUS
Status: DISCONTINUED | OUTPATIENT
Start: 2020-08-11 | End: 2020-08-11 | Stop reason: HOSPADM

## 2020-08-11 RX ORDER — MORPHINE SULFATE 10 MG/ML
2 INJECTION, SOLUTION INTRAMUSCULAR; INTRAVENOUS
Status: DISCONTINUED | OUTPATIENT
Start: 2020-08-11 | End: 2020-08-11 | Stop reason: HOSPADM

## 2020-08-11 RX ORDER — DIPHENHYDRAMINE HYDROCHLORIDE 50 MG/ML
12.5 INJECTION, SOLUTION INTRAMUSCULAR; INTRAVENOUS AS NEEDED
Status: DISCONTINUED | OUTPATIENT
Start: 2020-08-11 | End: 2020-08-11 | Stop reason: HOSPADM

## 2020-08-11 RX ORDER — ONDANSETRON 2 MG/ML
INJECTION INTRAMUSCULAR; INTRAVENOUS AS NEEDED
Status: DISCONTINUED | OUTPATIENT
Start: 2020-08-11 | End: 2020-08-11 | Stop reason: HOSPADM

## 2020-08-11 RX ORDER — PHENYLEPHRINE HCL IN 0.9% NACL 0.4MG/10ML
SYRINGE (ML) INTRAVENOUS AS NEEDED
Status: DISCONTINUED | OUTPATIENT
Start: 2020-08-11 | End: 2020-08-11 | Stop reason: HOSPADM

## 2020-08-11 RX ORDER — FENTANYL CITRATE 50 UG/ML
50 INJECTION, SOLUTION INTRAMUSCULAR; INTRAVENOUS AS NEEDED
Status: DISCONTINUED | OUTPATIENT
Start: 2020-08-11 | End: 2020-08-11 | Stop reason: HOSPADM

## 2020-08-11 RX ORDER — ROCURONIUM BROMIDE 10 MG/ML
INJECTION, SOLUTION INTRAVENOUS AS NEEDED
Status: DISCONTINUED | OUTPATIENT
Start: 2020-08-11 | End: 2020-08-11 | Stop reason: HOSPADM

## 2020-08-11 RX ORDER — OXYCODONE AND ACETAMINOPHEN 5; 325 MG/1; MG/1
1 TABLET ORAL AS NEEDED
Status: DISCONTINUED | OUTPATIENT
Start: 2020-08-11 | End: 2020-08-11 | Stop reason: HOSPADM

## 2020-08-11 RX ORDER — SUCCINYLCHOLINE CHLORIDE 20 MG/ML
INJECTION INTRAMUSCULAR; INTRAVENOUS AS NEEDED
Status: DISCONTINUED | OUTPATIENT
Start: 2020-08-11 | End: 2020-08-11 | Stop reason: HOSPADM

## 2020-08-11 RX ORDER — SODIUM CHLORIDE 9 MG/ML
25 INJECTION, SOLUTION INTRAVENOUS CONTINUOUS
Status: DISCONTINUED | OUTPATIENT
Start: 2020-08-11 | End: 2020-08-11 | Stop reason: HOSPADM

## 2020-08-11 RX ORDER — SCOLOPAMINE TRANSDERMAL SYSTEM 1 MG/1
PATCH, EXTENDED RELEASE TRANSDERMAL AS NEEDED
Status: DISCONTINUED | OUTPATIENT
Start: 2020-08-11 | End: 2020-08-11 | Stop reason: HOSPADM

## 2020-08-11 RX ORDER — ACETAMINOPHEN 325 MG/1
650 TABLET ORAL ONCE
Status: COMPLETED | OUTPATIENT
Start: 2020-08-11 | End: 2020-08-11

## 2020-08-11 RX ORDER — MIDAZOLAM HYDROCHLORIDE 1 MG/ML
1 INJECTION, SOLUTION INTRAMUSCULAR; INTRAVENOUS AS NEEDED
Status: DISCONTINUED | OUTPATIENT
Start: 2020-08-11 | End: 2020-08-11 | Stop reason: HOSPADM

## 2020-08-11 RX ORDER — LIDOCAINE HYDROCHLORIDE AND EPINEPHRINE 10; 10 MG/ML; UG/ML
30 INJECTION, SOLUTION INFILTRATION; PERINEURAL ONCE
Status: DISCONTINUED | OUTPATIENT
Start: 2020-08-11 | End: 2020-08-11 | Stop reason: HOSPADM

## 2020-08-11 RX ORDER — FENTANYL CITRATE 50 UG/ML
25 INJECTION, SOLUTION INTRAMUSCULAR; INTRAVENOUS
Status: DISCONTINUED | OUTPATIENT
Start: 2020-08-11 | End: 2020-08-11 | Stop reason: HOSPADM

## 2020-08-11 RX ORDER — GLYCOPYRROLATE 0.2 MG/ML
INJECTION INTRAMUSCULAR; INTRAVENOUS AS NEEDED
Status: DISCONTINUED | OUTPATIENT
Start: 2020-08-11 | End: 2020-08-11 | Stop reason: HOSPADM

## 2020-08-11 RX ORDER — SODIUM CHLORIDE 0.9 % (FLUSH) 0.9 %
5-40 SYRINGE (ML) INJECTION AS NEEDED
Status: DISCONTINUED | OUTPATIENT
Start: 2020-08-11 | End: 2020-08-11 | Stop reason: HOSPADM

## 2020-08-11 RX ORDER — CLINDAMYCIN PHOSPHATE 600 MG/50ML
600 INJECTION INTRAVENOUS ONCE
Status: COMPLETED | OUTPATIENT
Start: 2020-08-11 | End: 2020-08-11

## 2020-08-11 RX ORDER — DEXAMETHASONE SODIUM PHOSPHATE 4 MG/ML
INJECTION, SOLUTION INTRA-ARTICULAR; INTRALESIONAL; INTRAMUSCULAR; INTRAVENOUS; SOFT TISSUE AS NEEDED
Status: DISCONTINUED | OUTPATIENT
Start: 2020-08-11 | End: 2020-08-11 | Stop reason: HOSPADM

## 2020-08-11 RX ORDER — FENTANYL CITRATE 50 UG/ML
INJECTION, SOLUTION INTRAMUSCULAR; INTRAVENOUS AS NEEDED
Status: DISCONTINUED | OUTPATIENT
Start: 2020-08-11 | End: 2020-08-11 | Stop reason: HOSPADM

## 2020-08-11 RX ADMIN — FENTANYL CITRATE 25 MCG: 50 INJECTION, SOLUTION INTRAMUSCULAR; INTRAVENOUS at 09:37

## 2020-08-11 RX ADMIN — PROPOFOL 120 MG: 10 INJECTION, EMULSION INTRAVENOUS at 09:37

## 2020-08-11 RX ADMIN — ROCURONIUM BROMIDE 5 MG: 10 SOLUTION INTRAVENOUS at 09:37

## 2020-08-11 RX ADMIN — PHENYLEPHRINE HYDROCHLORIDE 20 MCG/MIN: 10 INJECTION INTRAVENOUS at 10:01

## 2020-08-11 RX ADMIN — SCOPALAMINE 1 PATCH: 1 PATCH, EXTENDED RELEASE TRANSDERMAL at 09:31

## 2020-08-11 RX ADMIN — ACETAMINOPHEN 650 MG: 325 TABLET ORAL at 09:16

## 2020-08-11 RX ADMIN — SUCCINYLCHOLINE CHLORIDE 120 MG: 20 INJECTION, SOLUTION INTRAMUSCULAR; INTRAVENOUS at 09:37

## 2020-08-11 RX ADMIN — Medication 80 MCG: at 09:49

## 2020-08-11 RX ADMIN — ONDANSETRON HYDROCHLORIDE 4 MG: 2 INJECTION, SOLUTION INTRAMUSCULAR; INTRAVENOUS at 11:20

## 2020-08-11 RX ADMIN — SODIUM CHLORIDE, SODIUM LACTATE, POTASSIUM CHLORIDE, AND CALCIUM CHLORIDE: 600; 310; 30; 20 INJECTION, SOLUTION INTRAVENOUS at 11:34

## 2020-08-11 RX ADMIN — PROCHLORPERAZINE EDISYLATE 5 MG: 5 INJECTION INTRAMUSCULAR; INTRAVENOUS at 13:03

## 2020-08-11 RX ADMIN — GLYCOPYRROLATE 0.2 MG: 0.2 INJECTION, SOLUTION INTRAMUSCULAR; INTRAVENOUS at 10:28

## 2020-08-11 RX ADMIN — LIDOCAINE HYDROCHLORIDE 60 MG: 20 INJECTION, SOLUTION EPIDURAL; INFILTRATION; INTRACAUDAL; PERINEURAL at 09:37

## 2020-08-11 RX ADMIN — MIDAZOLAM 0.5 MG: 1 INJECTION INTRAMUSCULAR; INTRAVENOUS at 13:26

## 2020-08-11 RX ADMIN — DEXAMETHASONE SODIUM PHOSPHATE 10 MG: 4 INJECTION, SOLUTION INTRAMUSCULAR; INTRAVENOUS at 09:42

## 2020-08-11 RX ADMIN — Medication 120 MCG: at 09:56

## 2020-08-11 RX ADMIN — SODIUM CHLORIDE, SODIUM LACTATE, POTASSIUM CHLORIDE, AND CALCIUM CHLORIDE 125 ML/HR: 600; 310; 30; 20 INJECTION, SOLUTION INTRAVENOUS at 09:23

## 2020-08-11 RX ADMIN — CLINDAMYCIN PHOSPHATE 600 MG: 600 INJECTION, SOLUTION INTRAVENOUS at 09:46

## 2020-08-11 NOTE — INTERVAL H&P NOTE
Update History & Physical 
 
The Patient's History and Physical of July 17, 2020, It was reviewed with the patient and I examined the patient. There was no change. The surgical site was confirmed by the patient and me. Plan:  The risk, benefits, expected outcome, and alternative to the recommended procedure have been discussed with the patient. Patient understands and wants to proceed with the procedure.  
 
Electronically signed by Sadie Bee MD on 8/11/2020 at 9:00 AM

## 2020-08-11 NOTE — DISCHARGE INSTRUCTIONS
Keep the dressings and pads over left eye ON and DRY until follow up appointment on Monday August 17  Resume any pre op medications and diet  Keep head elevated at night or when sleeping about 30 degrees, do not lay flat  Walk every couple of hours in your house during the day while awake for 10 minutes   May use ice packs over your RIGHT eye only for 5 minute intervals for a few days while awake  Use stool softeners to prevent constipation   Do not take pain medications on an empty stomach  NO strenuous activity or exercise for 4 weeks  Call DR. Ajit Holly at [phone] (cell) for questions or problems  Anticipate a phone call from DR. Zinsser tonight      DISCHARGE SUMMARY from Nurse    PATIENT INSTRUCTIONS:    After general anesthesia or intravenous sedation, for 24 hours or while taking prescription Narcotics:  · Limit your activities  · Do not drive and operate hazardous machinery  · Do not make important personal or business decisions  · Do  not drink alcoholic beverages  · If you have not urinated within 8 hours after discharge, please contact your surgeon on call.     Report the following to your surgeon:  · Excessive pain, swelling, redness or odor of or around the surgical area  · Temperature over 100.5  · Nausea and vomiting lasting longer than 4 hours or if unable to take medications  · Any signs of decreased circulation or nerve impairment to extremity: change in color, persistent  numbness, tingling, coldness or increase pain  · Any questions            ___________________________________________________________________________________________________________________________________

## 2020-08-11 NOTE — ROUTINE PROCESS
Patient: Charissa Otero MRN: [id number]  SSN: [id number] YOB: 1945  Age: 76 y.o. Sex: female Patient is status post Procedure(s): REVISION LOWER LID BLEPHAROPLASTY LEFT AND REVISION UPPER PTOSIS REPAR RIGHT. Surgeon(s) and Role: Jameson Perrin MD - Primary Local/Dose/Irrigation:  SEE MAR Peripheral IV 08/11/20 Left Wrist (Active) Site Assessment Clean, dry, & intact 08/11/20 9923 Phlebitis Assessment 0 08/11/20 8864 Infiltration Assessment 0 08/11/20 8338 Dressing Status Clean, dry, & intact 08/11/20 2047 Dressing Type Transparent;Tape 08/11/20 4233 Hub Color/Line Status Blue; Infusing 08/11/20 7514 Airway - Endotracheal Tube 08/11/20 Oral (Active) Dressing/Packing:  Wound Eye-Dressing Type: 4 x 4;Adhesive wound closure strips (Steri-Strips); Adhesive wound dressing (Mastisol); Xeroform (08/11/20 1125) Splint/Cast:  ] Other:

## 2020-08-11 NOTE — BRIEF OP NOTE
Brief Postoperative Note    Patient: Ulla Bosworth  YOB: 1945  MRN: [id number]    Date of Procedure: 8/11/2020     Pre-Op Diagnosis: COSMETIC    Post-Op Diagnosis: Same as preoperative diagnosis.       Procedure(s):  REVISION LOWER LID BLEPHAROPLASTY LEFT AND REVISION UPPER PTOSIS REPAR RIGHT    Surgeon(s):  David Allen MD    Surgical Assistant: None    Anesthesia: General     Estimated Blood Loss (mL): Minimal    Complications: None    Specimens: * No specimens in log *     Implants: * No implants in log *    Drains: * No LDAs found *    Findings: moderate scar and cicatrix    Electronically Signed by Leti Briones MD on 8/11/2020 at 11:40 AM

## 2020-08-11 NOTE — ANESTHESIA POSTPROCEDURE EVALUATION
Procedure(s):  REVISION LOWER LID BLEPHAROPLASTY LEFT AND REVISION UPPER PTOSIS REPAR RIGHT. general    Anesthesia Post Evaluation        Patient location during evaluation: PACU  Patient participation: complete - patient participated  Level of consciousness: awake  Pain management: adequate  Airway patency: patent  Anesthetic complications: no  Cardiovascular status: hemodynamically stable  Respiratory status: acceptable  Hydration status: acceptable  Comments: I have seen and evaluated the patient. The patient is ready for PACU discharge. Vidya Owen, DO                         INITIAL Post-op Vital signs:   Vitals Value Taken Time   /61 8/11/2020  1:00 PM   Temp 36.5 °C (97.7 °F) 8/11/2020 11:46 AM   Pulse 85 8/11/2020  1:05 PM   Resp 11 8/11/2020  1:05 PM   SpO2 95 % 8/11/2020  1:05 PM   Vitals shown include unvalidated device data.

## 2020-08-12 NOTE — OP NOTES
[facility]  OPERATIVE REPORT    Name:  Madelene Buerger  MR#:  [id number]  :  1945  ACCOUNT #:  [de-identified]  DATE OF SERVICE:  2020    PREOPERATIVE DIAGNOSES:  Personal history, bilateral upper lid blepharoptosis, dermatochalasis bilateral lower lids, history of previous cosmetic bilateral lower lid blepharoplasty, history of primary upper lid levator plication. POSTOPERATIVE DIAGNOSES:  Personal history, bilateral upper lid blepharoptosis, dermatochalasis bilateral lower lids, history of previous cosmetic bilateral lower lid blepharoplasty, history of primary upper lid levator plication. Persistent right upper lid blepharoptosis, left lower lid scleral show, scar cicatrix, lid malposition, desires revision surgery. PROCEDURE PERFORMED:  Revision, right upper lid, levator plication for blepharoptosis (plication 5 mm), revision left lower lid malposition with scar and cicatrix, release of scar, lateral lower lid canthopexy suture, full-thickness skin graft from left upper lid approximately 6 mm wide x 25 mm in length. SURGEON:  Chadwick Dumas MD    ASSISTANT:  Arnold Mcleod. STAFF:  OR staff room #3, [facility] 7th Floor. ANESTHESIA:  General.    COMPLICATIONS:  Obvious complications are none. SPECIMENS REMOVED:  None. IMPLANTS:  None. ESTIMATED BLOOD LOSS:  Approximately 5-10 mL. IV FLUIDS:  1000 mL. DRAINS:  None. FINDINGS:  Severe cicatrix tightness and lid malposition, left lower lid. INDICATION FOR PROCEDURE:  The patient is a pleasant 70-year-old female who underwent secondary lower lid blepharoplasty transcutaneous for dermatochalasis and age-appropriate changes and the upper lid levator plication for blepharoptosis approximately 3 months ago.   In the postoperative period, she was found to have some tightness and additional scar contracture on the left lower lid that resulted in ectropion, scleral show of approximately 3 to 4 mm and malposition despite efforts at conservative management with massage and pressure. The levator plication on left was successful, resulted in excursion to the limbus on the left; however, the right upper lid did not have as much excursion and she still suffered approximately 2-3 mm of blepharoptosis. She was interested in an attempted correction of both of these issues through additional plication and levator shortening on the right and to address the scar cicatrix and the malposition on the left. The plan is to go through the existing scar on the left lower lid with a skin only flap in a caudal direction, releasing the anterior and posterior lamella through the orbital septum and place additional support including the lateral canthopexy suture and a Frost stitch or tarsorrhaphy suture to hold the left lower lid in the cephalic position while she is healing. In addition, a small full-thickness skin graft in left upper lateral lid will be obtained to add some additional soft tissue. OPERATIVE PROCEDURE:  After appropriate consent was obtained, preop markings were placed. She was taken to the operating room and placed on the operative table in supine position. Satisfactory general endotracheal anesthesia was obtained. SCD devices were placed per routine. The table was rotated 180 degrees to provide exposure and her face and eyes were sterilely prepped and draped. We began irrigating her eyes with basic salt solution and lubrication coated corneal shield protections were placed over each eye prior to any advancement. We began on the patient's left side making an incision with a #15 scalpel blade on our existing scar and dissection was then performed in a cephalic direction to free up the soft tissues of the upper lid from the levator plication and the mechanism. We encountered a significant amount of scar cicatrix of the overlying skin and soft tissues to the underlying lid elevation mechanism. Some of the old silk sutures that were placed three months ago were visible and appeared to be intact. Additional plication was performed with what was felt to be the levator aponeurosis of approximately 5 millimeters in width as measured by calipers. A 6-0 silk was used to place horizontal mattress sutures x3. The soft tissues were then gently tucked underneath the silk as they were tied beginning laterally and then medially and the middle suture was tied last.  Any hemostasis was achieved with electrocautery and the upper lid incision was then reapproximated with a 7-0 Prolene in a running fashion. We then turned our attention to the left lower lid. Her existing well-healed scar in the subciliary position was reincised with a 50 scalpel blade, extending in a small caudal direction to about 7 or 8 mm in the canthus. With upper traction on the lower lid with a double skin hook, the skin only flap was then elevated with the tenotomy or iris scissors in a caudal direction to a distance near the infraorbital rim. The electrocautery was then used to transect through the orbicularis muscle at least 8-10 mm below the lash line and what would have been the preseptal orbicularis muscle to gain access to the orbital septum and free up the anterior and posterior lamella. Some intraoperative stretching was then performed with lower lid tissues with double skin hooks. A 6-0 silk was then used to place the lateral canthopexy suture to the lateral orbital periosteum and purchase of the lower lid orbicularis muscle. Her previously well-healed scar on the left upper lateral lid was then incised obtaining a full-thickness skin graft that measured approximately 6-7 mm in width and about 25 mm in length. That donor site was then reapproximated with a 7-0 Prolene in a running fashion. The skin graft was then placed over the exposed defect below the end subciliary incision and the edge of the lower lid skin.   It was attached with 6-0 nylon sutures in running and interrupted fashion. A 4-0 silk was then used to place a tarsorrhaphy suture or a Frost stitch for upper traction on the lower lid to aid in traction during the early healing period. The skin graft was reapproximated and attached and a bolster dressing of Xeroform gauze, 4 x 4 gauze, and half-inch Steri-Strips and Mastisol were placed over the skin graft. The 4-0 silk suture was then pulled in a cephalic direction and attached to the forehead skin above the hair bearing eyebrow with Mastisol and half-inch Steri-Strips. In addition, an eye patch was placed over the entire left eye and Steri-Strips were placed in the suture ends on the right eye. The corneal shield protectors were removed and both eyes were irrigated with bacitracin irrigation prior to dressing placement on the left. At the end of the bilateral procedures, sponge and needle counts were reported as correct. She was awakened, extubated, and transferred to the recovery room in satisfactory and stable condition. She will be discharged home today in care of her family with prescriptions and instructions and will follow up with me within 5-7 days.         Ela Arriaga MD      [id number]  D:  08/11/2020 11:56  T:  08/11/2020 23:12  JOB #:  [id number]  CC:  Sadie Bee MD

## 2021-08-03 ENCOUNTER — ANESTHESIA (OUTPATIENT)
Dept: ENDOSCOPY | Age: 76
End: 2021-08-03
Payer: MEDICARE

## 2021-08-03 ENCOUNTER — ANESTHESIA EVENT (OUTPATIENT)
Dept: ENDOSCOPY | Age: 76
End: 2021-08-03
Payer: MEDICARE

## 2021-08-03 ENCOUNTER — HOSPITAL ENCOUNTER (OUTPATIENT)
Age: 76
Setting detail: OUTPATIENT SURGERY
Discharge: HOME | End: 2021-08-03
Attending: SPECIALIST | Admitting: SPECIALIST
Payer: MEDICARE

## 2021-08-03 VITALS
DIASTOLIC BLOOD PRESSURE: 75 MMHG | SYSTOLIC BLOOD PRESSURE: 143 MMHG | HEIGHT: 62 IN | OXYGEN SATURATION: 99 % | HEART RATE: 63 BPM | WEIGHT: 140 LBS | RESPIRATION RATE: 15 BRPM | TEMPERATURE: 98 F | BODY MASS INDEX: 25.76 KG/M2

## 2021-08-03 PROCEDURE — 74011000250 HC RX REV CODE- 250: Performed by: NURSE ANESTHETIST, CERTIFIED REGISTERED

## 2021-08-03 PROCEDURE — 74011250637 HC RX REV CODE- 250/637: Performed by: SPECIALIST

## 2021-08-03 PROCEDURE — 74011250636 HC RX REV CODE- 250/636: Performed by: NURSE ANESTHETIST, CERTIFIED REGISTERED

## 2021-08-03 PROCEDURE — 2709999900 HC NON-CHARGEABLE SUPPLY: Performed by: SPECIALIST

## 2021-08-03 PROCEDURE — 76060000031 HC ANESTHESIA FIRST 0.5 HR: Performed by: SPECIALIST

## 2021-08-03 PROCEDURE — 76040000019: Performed by: SPECIALIST

## 2021-08-03 RX ORDER — SODIUM CHLORIDE 0.9 % (FLUSH) 0.9 %
5-40 SYRINGE (ML) INJECTION EVERY 8 HOURS
Status: DISCONTINUED | OUTPATIENT
Start: 2021-08-03 | End: 2021-08-03 | Stop reason: HOSPADM

## 2021-08-03 RX ORDER — EPINEPHRINE 0.1 MG/ML
1 INJECTION INTRACARDIAC; INTRAVENOUS
Status: DISCONTINUED | OUTPATIENT
Start: 2021-08-03 | End: 2021-08-03 | Stop reason: HOSPADM

## 2021-08-03 RX ORDER — SODIUM CHLORIDE 9 MG/ML
50 INJECTION, SOLUTION INTRAVENOUS CONTINUOUS
Status: DISCONTINUED | OUTPATIENT
Start: 2021-08-03 | End: 2021-08-03 | Stop reason: HOSPADM

## 2021-08-03 RX ORDER — NALOXONE HYDROCHLORIDE 0.4 MG/ML
0.4 INJECTION, SOLUTION INTRAMUSCULAR; INTRAVENOUS; SUBCUTANEOUS
Status: DISCONTINUED | OUTPATIENT
Start: 2021-08-03 | End: 2021-08-03 | Stop reason: HOSPADM

## 2021-08-03 RX ORDER — DEXTROMETHORPHAN/PSEUDOEPHED 2.5-7.5/.8
1.2 DROPS ORAL
Status: DISCONTINUED | OUTPATIENT
Start: 2021-08-03 | End: 2021-08-03 | Stop reason: HOSPADM

## 2021-08-03 RX ORDER — LIDOCAINE HYDROCHLORIDE 20 MG/ML
INJECTION, SOLUTION EPIDURAL; INFILTRATION; INTRACAUDAL; PERINEURAL AS NEEDED
Status: DISCONTINUED | OUTPATIENT
Start: 2021-08-03 | End: 2021-08-03 | Stop reason: HOSPADM

## 2021-08-03 RX ORDER — FLUMAZENIL 0.1 MG/ML
0.2 INJECTION INTRAVENOUS
Status: DISCONTINUED | OUTPATIENT
Start: 2021-08-03 | End: 2021-08-03 | Stop reason: HOSPADM

## 2021-08-03 RX ORDER — PROPOFOL 10 MG/ML
INJECTION, EMULSION INTRAVENOUS AS NEEDED
Status: DISCONTINUED | OUTPATIENT
Start: 2021-08-03 | End: 2021-08-03 | Stop reason: HOSPADM

## 2021-08-03 RX ORDER — SODIUM CHLORIDE 0.9 % (FLUSH) 0.9 %
5-40 SYRINGE (ML) INJECTION AS NEEDED
Status: DISCONTINUED | OUTPATIENT
Start: 2021-08-03 | End: 2021-08-03 | Stop reason: HOSPADM

## 2021-08-03 RX ORDER — ATROPINE SULFATE 0.1 MG/ML
0.5 INJECTION INTRAVENOUS
Status: DISCONTINUED | OUTPATIENT
Start: 2021-08-03 | End: 2021-08-03 | Stop reason: HOSPADM

## 2021-08-03 RX ORDER — SODIUM CHLORIDE 9 MG/ML
INJECTION, SOLUTION INTRAVENOUS
Status: DISCONTINUED | OUTPATIENT
Start: 2021-08-03 | End: 2021-08-03 | Stop reason: HOSPADM

## 2021-08-03 RX ADMIN — SODIUM CHLORIDE: 900 INJECTION, SOLUTION INTRAVENOUS at 14:56

## 2021-08-03 RX ADMIN — SODIUM CHLORIDE: 900 INJECTION, SOLUTION INTRAVENOUS at 15:48

## 2021-08-03 RX ADMIN — PROPOFOL 30 MG: 10 INJECTION, EMULSION INTRAVENOUS at 15:40

## 2021-08-03 RX ADMIN — PROPOFOL 100 MG: 10 INJECTION, EMULSION INTRAVENOUS at 15:28

## 2021-08-03 RX ADMIN — LIDOCAINE HYDROCHLORIDE 50 MG: 20 INJECTION, SOLUTION EPIDURAL; INFILTRATION; INTRACAUDAL; PERINEURAL at 15:28

## 2021-08-03 RX ADMIN — PROPOFOL 30 MG: 10 INJECTION, EMULSION INTRAVENOUS at 15:43

## 2021-08-03 RX ADMIN — PROPOFOL 30 MG: 10 INJECTION, EMULSION INTRAVENOUS at 15:30

## 2021-08-03 RX ADMIN — PROPOFOL 30 MG: 10 INJECTION, EMULSION INTRAVENOUS at 15:32

## 2021-08-03 RX ADMIN — PROPOFOL 30 MG: 10 INJECTION, EMULSION INTRAVENOUS at 15:37

## 2021-08-03 RX ADMIN — PROPOFOL 30 MG: 10 INJECTION, EMULSION INTRAVENOUS at 15:34

## 2021-08-03 NOTE — ANESTHESIA POSTPROCEDURE EVALUATION
Post-Anesthesia Evaluation and Assessment    Patient: Delon Aguilar MRN: [id number]  SSN: [id number]    YOB: 1945  Age: 76 y.o. Sex: female      I have evaluated the patient and they are stable and ready for discharge from the PACU. Cardiovascular Function/Vital Signs  Visit Vitals  /63   Pulse 80   Temp 36.7 °C (98 °F)   Resp 14   Ht 5' 2\" (1.575 m)   Wt 63.5 kg (140 lb)   SpO2 100%   BMI 25.61 kg/m²       Patient is status post MAC anesthesia for Procedure(s):  COLONOSCOPY. Nausea/Vomiting: None    Postoperative hydration reviewed and adequate. Pain:  Pain Scale 1: Visual (08/03/21 1551)  Pain Intensity 1: 0 (08/03/21 1551)   Managed    Neurological Status: At baseline    Mental Status, Level of Consciousness: Alert and  oriented to person, place, and time    Pulmonary Status:   O2 Device: Nasal cannula (08/03/21 1551)   Adequate oxygenation and airway patent    Complications related to anesthesia: None    Post-anesthesia assessment completed. No concerns    Signed By: Jodi Meeks MD     August 3, 2021              Procedure(s):  COLONOSCOPY. MAC    <BSHSIANPOST>    INITIAL Post-op Vital signs:   Vitals Value Taken Time   /63 08/03/21 1551   Temp 36.7 °C (98 °F) 08/03/21 1551   Pulse 78 08/03/21 1555   Resp 20 08/03/21 1555   SpO2 96 % 08/03/21 1555   Vitals shown include unvalidated device data.

## 2021-08-03 NOTE — PERIOP NOTES

## 2021-08-03 NOTE — H&P
Colonoscopy History and Physical      The patient was seen and examined. Date of last colonoscopy: 2011, Polyps  No      The airway was assessed and documented. The problem list, past medical history, and medications were reviewed. Patient Active Problem List   Diagnosis Code    Encounter for cosmetic surgery Z41.1    Transient cerebral ischemia G45.9    Chest pain R07.9    Dyslipidemia E78.5    Mixed incontinence N39.46     Social History     Socioeconomic History    Marital status:      Spouse name: Not on file    Number of children: Not on file    Years of education: Not on file    Highest education level: Not on file   Occupational History    Not on file   Tobacco Use    Smoking status: Never Smoker    Smokeless tobacco: Never Used   Vaping Use    Vaping Use: Never used   Substance and Sexual Activity    Alcohol use: Not Currently    Drug use: No    Sexual activity: Not on file   Other Topics Concern    Not on file   Social History Narrative    Not on file     Social Determinants of Health     Financial Resource Strain:     Difficulty of Paying Living Expenses:    Food Insecurity:     Worried About [address] in the Last Year:     [address] in the Last Year:    Transportation Needs:     Lack of Transportation (Medical):      Lack of Transportation (Non-Medical):    Physical Activity:     Days of Exercise per Week:     Minutes of Exercise per Session:    Stress:     Feeling of Stress :    Social Connections:     Frequency of Communication with Friends and Family:     Frequency of Social Gatherings with Friends and Family:     Attends Voodoo Services:     Active Member of Clubs or Organizations:     Attends Club or Organization Meetings:     Marital Status:    Intimate Partner Violence:     Fear of Current or Ex-Partner:     Emotionally Abused:     Physically Abused:     Sexually Abused:      Past Medical History:   Diagnosis Date    Arthritis     Diverticulitis 2009    HAS HAD NO EPISODES SINCE COLOSTOMY REVERSAL    GERD (gastroesophageal reflux disease)     Hypertension     Nausea & vomiting     PONV    Stroke ([facility] [address] 08/2016    TIA ;possible association to prempro. ; NO RESIDUAL DEFICITS         Prior to Admission Medications   Prescriptions Last Dose Informant Patient Reported? Taking?   valsartan-hydroCHLOROthiazide (DIOVAN-HCT) 160-12.5 mg per tablet 7/27/2021 at Unknown time  Yes Yes   Sig: Take 1 Tab by mouth nightly. Facility-Administered Medications: None       The patient was seen and examined in the endoscopy suite. The airway was assessed and documented. The problem list and medications were reviewed. Chief complaint, history of present illness, and review of systems and Past medical History are positive for: GERD and colon cancer screening    The heart, lungs and mental status were satisfactory for the administration of sedation and for the procedure. I discussed with the patient the objectives, risks, consequences and alternatives to the procedure. The patient was counseled at length about the risks of nawaf Covid-19 in the beata-operative and post-operative states including the recovery window of their procedure. The patient was made aware that nawaf Covid-19 after a surgical procedure may worsen their prognosis for recovering from the virus and lend to a higher morbidity and or mortality risk. The patient was given the options of postponing their procedure. All of the risks, benefits, and alternatives were discussed. The patient does  wish to proceed with the procedure.     Plan: Endoscopic procedure with sedation     Bhumi Vizcarra MD   8/3/2021  3:24 PM

## 2021-08-03 NOTE — PROCEDURES
[address]  [address] [facility], [address] [place] [address]                 Colonoscopy Procedure Note    Indications:   See Preoperative Diagnosis above  Referring Physician: [facility]  Anesthesia/Sedation: MAC anesthesia Propofol  Endoscopist:  Dr. Pb Peterson  Assistant:  Endoscopy Technician-1: Anju Schwab IV  Endoscopy RN-1: Sandy Leach RN  Preoperative diagnosis: SCREENING  Postoperative diagnosis: Diverticulosis    Procedure in Detail:  Informed consent was obtained for the procedure, including sedation. Risks of perforation, hemorrhage, adverse drug reaction, and aspiration were discussed. The patient was placed in the left lateral decubitus position. Based on the pre-procedure assessment, including review of the patient's medical history, medications, allergies, and review of systems, she had been deemed to be an appropriate candidate for moderate sedation; she was therefore sedated with the medications listed above. The patient was monitored continuously with ECG tracing, pulse oximetry, blood pressure monitoring, and direct observations. A rectal examination was performed. The [id number] was inserted into the rectum and advanced under direct vision to the cecum, which was identified by the ileocecal valve and appendiceal orifice. The quality of the colonic preparation was good. A careful inspection was made as the colonoscope was withdrawn, including a retroflexed view of the rectum; findings and interventions are described below. Appropriate photodocumentation was obtained. Findings:  Rectum: normal appearing anastomosis in proximal rectum  Sigmoid: surgically absent  Descending Colon: moderate diverticulosis;  Transverse Colon: moderate diverticulosis; Ascending Colon: normal  Cecum: normal      Specimens:    none    EBL: None    Complications: None; patient tolerated the procedure well.     Recommendations:     - For colon cancer screening in this average-risk patient, colonoscopy may be repeated in 10 years. - High fiber diet.     Signed By: Rosy Redd MD                        August 3, 2021

## 2021-08-03 NOTE — DISCHARGE INSTRUCTIONS
Kya Beltre  [id number]  1945    COLON DISCHARGE INSTRUCTIONS  Discomfort:  Redness at IV site- apply warm compress to area; if redness or soreness persist- contact your physician  There may be a slight amount of blood passed from the rectum  Gaseous discomfort- walking, belching will help relieve any discomfort    DIET:   High fiber diet. - however -  remember your colon is empty and a heavy meal will produce gas. Avoid these foods:  vegetables, fried / greasy foods, carbonated drinks for today. You may not drink alcoholic beverages for at least 12 hours    MEDICATIONS:   Regarding Aspirin or Nonsteroidal medications, please see below. ACTIVITY:  You may resume your normal daily activities it is recommended that you spend the remainder of the day resting -  avoid any strenuous activity. You may not operate a vehicle for 12 hours  You may not engage in an occupation involving machinery or appliances for rest of today  Avoid making any critical decisions for at least 24 hour    CALL M.D. ANY SIGN OF:  Increasing pain, nausea, vomiting  Abdominal distension (swelling)  New increased bleeding (oral or rectal)  Fever (chills)  Pain in chest area  Bloody discharge from nose or mouth  Shortness of breath    You may  take any Advil, Aspirin, Ibuprofen, Motrin, Aleve, or  Tylenol as needed for pain. Post procedure diagnosis: Diverticulosis      Follow-up Instructions:   Call Dr. Yuki Almaraz  Results of procedure / biopsy in 10 days if biopsies were done. Telephone #  [phone]      DISCHARGE SUMMARY from Nurse    The following personal items collected during your admission are returned to you:   Dental Appliance: Dental Appliances: None  Vision: Visual Aid: Glasses  Hearing Aid:    Jewelry:    Clothing:    Other Valuables:    Valuables sent to safe:      Learning About Coronavirus (COVID-19)  Coronavirus (COVID-19): Overview  What is coronavirus (COVID-19)?   The coronavirus disease (COVID-19) is caused by a virus. It is an illness that was first found in [place], [place], in December 2019. It has since spread worldwide. The virus can cause fever, cough, and trouble breathing. In severe cases, it can cause pneumonia and make it hard to breathe without help. It can cause death. Coronaviruses are a large group of viruses. They cause the common cold. They also cause more serious illnesses like Middle East respiratory syndrome (MERS) and severe acute respiratory syndrome (SARS). COVID-19 is caused by a novel coronavirus. That means it's a new type that has not been seen in people before. This virus spreads person-to-person through droplets from coughing and sneezing. It can also spread when you are close to someone who is infected. And it can spread when you touch something that has the virus on it, such as a doorknob or a tabletop. What can you do to protect yourself from coronavirus (COVID-19)? The best way to protect yourself from getting sick is to:  · Avoid areas where there is an outbreak. · Avoid contact with people who may be infected. · Wash your hands often with soap or alcohol-based hand sanitizers. · Avoid crowds and try to stay at least 6 feet away from other people. · Wash your hands often, especially after you cough or sneeze. Use soap and water, and scrub for at least 20 seconds. If soap and water aren't available, use an alcohol-based hand . · Avoid touching your mouth, nose, and eyes. What can you do to avoid spreading the virus to others? To help avoid spreading the virus to others:  · Cover your mouth with a tissue when you cough or sneeze. Then throw the tissue in the trash. · Use a disinfectant to clean things that you touch often. · Stay home if you are sick or have been exposed to the virus. Don't go to school, work, or public areas. And don't use public transportation. · If you are sick:  ? Leave your home only if you need to get medical care.  But call the doctor's office first so they know you're coming. And wear a face mask, if you have one.  ? If you have a face mask, wear it whenever you're around other people. It can help stop the spread of the virus when you cough or sneeze. ? Clean and disinfect your home every day. Use household  and disinfectant wipes or sprays. Take special care to clean things that you grab with your hands. These include doorknobs, remote controls, phones, and handles on your refrigerator and microwave. And don't forget countertops, tabletops, bathrooms, and computer keyboards. When to call for help  Call 911 anytime you think you may need emergency care. For example, call if:  · You have severe trouble breathing. (You can't talk at all.)  · You have constant chest pain or pressure. · You are severely dizzy or lightheaded. · You are confused or can't think clearly. · Your face and lips have a blue color. · You pass out (lose consciousness) or are very hard to wake up. Call your doctor now if you develop symptoms such as:  · Shortness of breath. · Fever. · Cough. If you need to get care, call ahead to the doctor's office for instructions before you go. Make sure you wear a face mask, if you have one, to prevent exposing other people to the virus. Where can you get the latest information? The following health organizations are tracking and studying this virus. Their websites contain the most up-to-date information. Cee Hardwick also learn what to do if you think you may have been exposed to the virus. · U.S. Centers for Disease Control and Prevention (CDC): The CDC provides updated news about the disease and travel advice. The website also tells you how to prevent the spread of infection. www.cdc.gov  · World Health Organization [facility]): WHO offers information about the virus outbreaks. WHO also has travel advice. www.who.int  Current as of: April 1, 2020               Content Version: 12.4  © 1140-8432 [employer].    Care instructions adapted under license by your healthcare professional. If you have questions about a medical condition or this instruction, always ask your healthcare professional. [address] any warranty or liability for your use of this information. Patient Education        Diverticulosis: Care Instructions  Your Care Instructions  In diverticulosis, pouches called diverticula form in the wall of the large intestine (colon). The pouches do not cause any pain or other symptoms. Most people who have diverticulosis do not know they have it. But the pouches sometimes bleed, and if they become infected, they can cause pain and other symptoms. When this happens, it is called diverticulitis. Diverticula form when pressure pushes the wall of the colon outward at certain weak points. A diet that is too low in fiber can cause diverticula. Follow-up care is a key part of your treatment and safety. Be sure to make and go to all appointments, and call your doctor if you are having problems. It's also a good idea to know your test results and keep a list of the medicines you take. How can you care for yourself at home? · Include fruits, leafy green vegetables, beans, and whole grains in your diet each day. These foods are high in fiber. · Take a fiber supplement, such as Citrucel or Metamucil, every day if needed. Read and follow all instructions on the label. · Drink plenty of fluids. If you have kidney, heart, or liver disease and have to limit fluids, talk with your doctor before you increase the amount of fluids you drink. · Get at least 30 minutes of exercise on most days of the week. Walking is a good choice. You also may want to do other activities, such as running, swimming, cycling, or playing tennis or team sports. · Cut out foods that cause gas, pain, or other symptoms. When should you call for help? Call your doctor now or seek immediate medical care if:    · You have belly pain.   · You pass maroon or very bloody stools.     · You have a fever.     · You have nausea and vomiting.     · You have unusual changes in your bowel movements or abdominal swelling.     · You have burning pain when you urinate.     · You have abnormal vaginal discharge.     · You have shoulder pain.     · You have cramping pain that does not get better when you have a bowel movement or pass gas.     · You pass gas or stool from your urethra while urinating. Watch closely for changes in your health, and be sure to contact your doctor if you have any problems. Where can you learn more? Go to http://www.gray.com/  Enter [id number] in the search box to learn more about \"Diverticulosis: Care Instructions. \"  Current as of: April 15, 2020               Content Version: 12.8  © 2006-2021 [employer]. Care instructions adapted under license by [employer] (which disclaims liability or warranty for this information). If you have questions about a medical condition or this instruction, always ask your healthcare professional. [address] any warranty or liability for your use of this information.

## 2022-01-11 ENCOUNTER — HOSPITAL ENCOUNTER (OUTPATIENT)
Dept: MAMMOGRAPHY | Age: 77
Discharge: HOME | End: 2022-01-11
Attending: FAMILY MEDICINE
Payer: MEDICARE

## 2022-01-11 DIAGNOSIS — Z12.31 SCREENING MAMMOGRAM FOR BREAST CANCER: ICD-10-CM

## 2022-01-11 PROCEDURE — 77063 BREAST TOMOSYNTHESIS BI: CPT

## 2022-01-26 ENCOUNTER — TRANSCRIBE ORDER (OUTPATIENT)
Dept: REGISTRATION | Age: 77
End: 2022-01-26

## 2022-01-26 DIAGNOSIS — Z01.812 PRE-PROCEDURE LAB EXAM: Primary | ICD-10-CM

## 2022-02-01 ENCOUNTER — HOSPITAL ENCOUNTER (OUTPATIENT)
Dept: PREADMISSION TESTING | Age: 77
Discharge: HOME | End: 2022-02-01
Payer: MEDICARE

## 2022-02-01 ENCOUNTER — HOSPITAL ENCOUNTER (OUTPATIENT)
Dept: PREADMISSION TESTING | Age: 77
Discharge: HOME | End: 2022-02-01
Attending: ORTHOPAEDIC SURGERY
Payer: MEDICARE

## 2022-02-01 VITALS
WEIGHT: 149.8 LBS | HEIGHT: 62 IN | HEART RATE: 83 BPM | TEMPERATURE: 98 F | DIASTOLIC BLOOD PRESSURE: 76 MMHG | RESPIRATION RATE: 20 BRPM | SYSTOLIC BLOOD PRESSURE: 126 MMHG | BODY MASS INDEX: 27.57 KG/M2

## 2022-02-01 DIAGNOSIS — Z01.812 PRE-PROCEDURE LAB EXAM: ICD-10-CM

## 2022-02-01 LAB
ABO + RH BLD: NORMAL
ANION GAP SERPL CALC-SCNC: 2 MMOL/L (ref 5–15)
APPEARANCE UR: CLEAR
ATRIAL RATE: 71 BPM
BACTERIA URNS QL MICRO: NEGATIVE /HPF
BILIRUB UR QL: NEGATIVE
BLOOD GROUP ANTIBODIES SERPL: NORMAL
BUN SERPL-MCNC: 22 MG/DL (ref 6–20)
BUN/CREAT SERPL: 24 (ref 12–20)
CALCIUM SERPL-MCNC: 9.1 MG/DL (ref 8.5–10.1)
CALCULATED P AXIS, ECG09: 61 DEGREES
CALCULATED R AXIS, ECG10: 34 DEGREES
CALCULATED T AXIS, ECG11: 56 DEGREES
CHLORIDE SERPL-SCNC: 106 MMOL/L (ref 97–108)
CO2 SERPL-SCNC: 31 MMOL/L (ref 21–32)
COLOR UR: NORMAL
CREAT SERPL-MCNC: 0.9 MG/DL (ref 0.55–1.02)
DIAGNOSIS, 93000: NORMAL
EPITH CASTS URNS QL MICRO: NORMAL /LPF
ERYTHROCYTE [DISTWIDTH] IN BLOOD BY AUTOMATED COUNT: 12.1 % (ref 11.5–14.5)
EST. AVERAGE GLUCOSE BLD GHB EST-MCNC: 108 MG/DL
GLUCOSE SERPL-MCNC: 105 MG/DL (ref 65–100)
GLUCOSE UR STRIP.AUTO-MCNC: NEGATIVE MG/DL
HBA1C MFR BLD: 5.4 % (ref 4–5.6)
HCT VFR BLD AUTO: 44.6 % (ref 35–47)
HGB BLD-MCNC: 14.5 G/DL (ref 11.5–16)
HGB UR QL STRIP: NEGATIVE
HYALINE CASTS URNS QL MICRO: NORMAL /LPF (ref 0–5)
INR PPP: 1 (ref 0.9–1.1)
KETONES UR QL STRIP.AUTO: NEGATIVE MG/DL
LEUKOCYTE ESTERASE UR QL STRIP.AUTO: NEGATIVE
MCH RBC QN AUTO: 30.8 PG (ref 26–34)
MCHC RBC AUTO-ENTMCNC: 32.5 G/DL (ref 30–36.5)
MCV RBC AUTO: 94.7 FL (ref 80–99)
NITRITE UR QL STRIP.AUTO: NEGATIVE
NRBC # BLD: 0 K/UL (ref 0–0.01)
NRBC BLD-RTO: 0 PER 100 WBC
P-R INTERVAL, ECG05: 152 MS
PH UR STRIP: 6.5 [PH] (ref 5–8)
PLATELET # BLD AUTO: 153 K/UL (ref 150–400)
PMV BLD AUTO: 10.6 FL (ref 8.9–12.9)
POTASSIUM SERPL-SCNC: 3.8 MMOL/L (ref 3.5–5.1)
PROT UR STRIP-MCNC: NEGATIVE MG/DL
PROTHROMBIN TIME: 10.2 SEC (ref 9–11.1)
Q-T INTERVAL, ECG07: 382 MS
QRS DURATION, ECG06: 82 MS
QTC CALCULATION (BEZET), ECG08: 415 MS
RBC # BLD AUTO: 4.71 M/UL (ref 3.8–5.2)
RBC #/AREA URNS HPF: NORMAL /HPF (ref 0–5)
SODIUM SERPL-SCNC: 139 MMOL/L (ref 136–145)
SP GR UR REFRACTOMETRY: 1.03 (ref 1–1.03)
SPECIMEN EXP DATE BLD: NORMAL
UA: UC IF INDICATED,UAUC: NORMAL
UROBILINOGEN UR QL STRIP.AUTO: 0.2 EU/DL (ref 0.2–1)
VENTRICULAR RATE, ECG03: 71 BPM
WBC # BLD AUTO: 3.3 K/UL (ref 3.6–11)
WBC URNS QL MICRO: NORMAL /HPF (ref 0–4)

## 2022-02-01 PROCEDURE — 80048 BASIC METABOLIC PNL TOTAL CA: CPT

## 2022-02-01 PROCEDURE — 86900 BLOOD TYPING SEROLOGIC ABO: CPT

## 2022-02-01 PROCEDURE — 85610 PROTHROMBIN TIME: CPT

## 2022-02-01 PROCEDURE — 93005 ELECTROCARDIOGRAM TRACING: CPT

## 2022-02-01 PROCEDURE — U0005 INFEC AGEN DETEC AMPLI PROBE: HCPCS

## 2022-02-01 PROCEDURE — 81001 URINALYSIS AUTO W/SCOPE: CPT

## 2022-02-01 PROCEDURE — 83036 HEMOGLOBIN GLYCOSYLATED A1C: CPT

## 2022-02-01 PROCEDURE — 85027 COMPLETE CBC AUTOMATED: CPT

## 2022-02-01 RX ORDER — PANTOPRAZOLE SODIUM 40 MG/1
40 TABLET, DELAYED RELEASE ORAL DAILY
COMMUNITY

## 2022-02-01 RX ORDER — ROSUVASTATIN CALCIUM 5 MG/1
5 TABLET, COATED ORAL DAILY
COMMUNITY

## 2022-02-01 RX ORDER — GLUCOSAMINE/CHONDR SU A SOD 750-600 MG
TABLET ORAL DAILY
COMMUNITY
End: 2022-02-26

## 2022-02-01 RX ORDER — MELATONIN
1000 DAILY
COMMUNITY

## 2022-02-01 NOTE — PERIOP NOTES
[facility] INSTRUCTIONS  ORTHOPAEDIC    Surgery Date:   2-4-22    [facility] staff will call you between 4 PM- 8 PM the day before surgery with your arrival time. If your surgery is on a Monday, we will call you the preceding Friday. Please call [phone] after 8 PM if you did not receive your arrival time. 1. Please report to [facility] Patient Access/Admitting on the 1st floor. Bring your insurance card, photo identification, and any copayment (if applicable). 2. If you are going home the same day of your surgery, you must have a responsible adult to drive you home. You need to have a responsible adult to stay with you the first 24 hours after surgery and you should not drive a car for 24 hours following your surgery. 3. Do NOT eat any solid foods after midnight the night before surgery including candy, mints or gum. You may drink clear liquids from midnight until 1 hour prior to arrival time. You may drink up to 12 ounces at one time every 4 hours. 4. Do NOT drink alcohol or smoke 24 hours before surgery. STOP smoking for 14 days prior as it helps with breathing and healing after surgery. 5. If your arrival time is 3pm or later, you may eat a light breakfast before 8am (toast, bagel-no butter, black coffee, plain tea, fruit juice-no pulp) Please note special instructions, if applicable, below for medications. 6. If you are being admitted to the hospital,please leave personal belongings/luggage in your car until you have an assigned hospital room number. 7. Please wear comfortable clothes. Wear your glasses instead of contacts. We ask that all money, jewelry and valuables be left at home. Wear no make up, particularly mascara, the day of surgery. 8.  All body piercings, rings, and jewelry need to be removed and left at home. Please remove any nail polish or artificial nails from your fingernails. Please wear your hair loose or down.  Please no pony-tails, buns, or any metal hair accessories. If you shower the morning of surgery, please do not apply any lotions or powders afterwards. You may wear deodorant. Do not shave any body area within 24 hours of your surgery. 9. Please follow all instructions to avoid any potential surgical cancellation. 10. Should your physical condition change, (i.e. fever, cold, flu, etc.) please notify your surgeon as soon as possible. 11. It is important to be on time. If a situation occurs where you may be delayed, please call:  [phone] / [phone] on the day of surgery. 12. The Preadmission Testing staff can be reached at [phone]. 13. Special instructions: NONE    Current Outpatient Medications   Medication Sig    pantoprazole (Protonix) 40 mg tablet Take 40 mg by mouth daily.  mirabegron ER (Myrbetriq) 25 mg ER tablet Take 25 mg by mouth daily.  rosuvastatin (CRESTOR) 20 mg tablet Take 20 mg by mouth daily.  Biotin 2,500 mcg cap Take  by mouth daily.  cholecalciferol (Vitamin D3) (1000 Units /25 mcg) tablet Take 1,000 Units by mouth daily.  valsartan-hydroCHLOROthiazide (DIOVAN-HCT) 160-12.5 mg per tablet Take 1 Tablet by mouth daily. No current facility-administered medications for this encounter. 1. YOU MUST ONLY TAKE THESE MEDICATIONS THE MORNING OF SURGERY WITH A SIP OF WATER: ROSUVASTATIN AND PROTONIX  2. MEDICATIONS TO TAKE THE MORNING OF SURGERY ONLY IF NEEDED: NONE  3. HOLD these prescription medications BEFORE Surgery: NONE  4. Ask your surgeon/prescribing physician about when/if to STOP taking these medications: NONE  5. Stop any non-steroidal anti-inflammatory drugs (i.e. Ibuprofen, Naproxen, Advil, Aleve) 3 days before surgery. You may take Tylenol. STOP all vitamins and herbal supplements 1 week prior to  surgery. 6. If you are currently taking Plavix, Coumadin, or any other blood-thinning/anticoagulant medication contact your prescribing physician for instructions.     Preventing Infections Before and After - Your Surgery    IMPORTANT INSTRUCTIONS    Please read and follow these instructions carefully. If you are unable to comply with the below instructions your procedure will be cancelled. You play an important role in your health and preparation for surgery. To reduce the germs on your skin you will need to shower with CHG soap (Chorhexidine gluconate 4%) two times before surgery. CHG soap (Hibiclens, Hex-A-Clens or store brand)   CHG soap will be provided at your Preadmission Testing (PAT) appointment.  If you do not have a PAT appointment before surgery, you may arrange to  CHG soap from our office or purchase CHG soap at a pharmacy, grocery or department store.  You need to purchase TWO 4 ounce bottles to use for your 2 showers. Steps to follow:  1. Wash your hair with your normal shampoo and your body with regular soap and rinse well to remove shampoo and soap from your skin. 2. Wet a clean washcloth and turn off the shower. 3. Put CHG soap on washcloth and apply to your entire body from the neck down. Do not use on your head, face or private parts(genitals). Do not use CHG soap on open sores, wounds or areas of skin irritation. 4. Wash you body gently for 5 minutes. Do not wash your skin too hard. This soap does not create lather. Pay special attention to your underarms and from your belly button to your feet. 5. Turn the shower back on and rinse well to get CHG soap off your body. 6. Pat your skin dry with a clean, dry towel. Do not apply lotions or moisturizer. 7. Put on clean clothes and sleep on fresh bed sheets and do not allow pets to sleep with you. Shower with CHG soap 2 times before your surgery   The evening before your surgery   The morning of your surgery      Tips to help prevent infections after your surgery:  1.  Protect your surgical wound from germs:  ? Hand washing is the most important thing you and your caregivers can do to prevent infections. ? Keep your bandage clean and dry! ? Do not touch your surgical wound. 2. Use clean, freshly washed towels and washcloths every time you shower; do not share bath linens with others. 3. Until your surgical wound is healed, wear clothing and sleep on bed linens each day that are clean and freshly washed. 4. Do not allow pets to sleep in your bed with you or touch your surgical wound. 5. Do not smoke - smoking delays wound healing. This may be a good time to stop smoking. 6. If you have diabetes, it is important for you to manage your blood sugar levels properly before your surgery as well as after your surgery. Poorly managed blood sugar levels slow down wound healing and prevent you from healing completely. Prevention of Infection  Testing for Staphylococcus aureus on your skin before surgery    Staphylococcus aureus (staph) is a common bacteria that is found on the body. It normally does not cause infection on healthy skin. Before surgery, you will be tested to see if you have staph by swabbing the inside of your nose. When you have an incision with surgery, the goal is to protect that incision from infection. Removal of the staph bacteria before surgery can decrease the risk of a surgical site infection. If your nose swab is positive for staph you will be called. Your treatment will include 2 steps:   Prescription for Mupirocin ointment to be used in each nostril twice a day for 5 days.  Showering with Chlorhexidine (CHG) liquid soap for 5 days prior to surgery. How to use Mupirocin ointment in your nose  1.  the prescription from your pharmacy. You will receive a large tube of ointment which will be big enough for all of your treatments. You will apply this ointment to each nostril 2 times a day for 5 days. 2. Wash your hands with  gel or soap and water for 20 seconds before using ointment. 3. Place a pea-sized amount of ointment on a cotton Q-tip.   4. Apply ointment just inside of each nostril with the Q-tip. Do not push Q-tip or ointment deep inside you nose. 5. Press your nostrils together and massage for a few seconds. 6. Wash your hands with  gel or soap and water after you are finished. 7. Do not get ointment near your eyes. If it gets into your eyes, rinse them with cool water. 8. If you need to use nasal spray, clean the tip of the bottle with alcohol before use and do not use both at the same time. 9. If you are scheduled for COVID testing during the 5 days, do NOT apply morning dose until after the COVID test has been performed. How to use Chlorhexidine (CHG) 4% liquid soap  1. Purchase an 8 ounce bottle of CHG liquid soap (Chlorhexidine 4%, Hibiclens, Hex-A-Clens or store brand) at a pharmacy or grocery store. 2. Wash your hair with your normal shampoo and your body with regular soap and rinse well to remove shampoo and soap from your skin. 3. Wet a clean washcloth and turn off the shower. 4. Put CHG soap on washcloth and apply to your entire body from the neck down. Do not use on your head, face or private parts(genitals). Do not use CHG soap on open sores, wounds or areas of skin irritation. 5. Wash your body gently for 5 minutes. Do not wash your skin too hard. This soap does not create lather. Pay special attention to your underarms and from your belly button to your feet. 6. Turn the shower back on and rinse well to get CHG soap off your body. 7. Pat your skin dry with a clean, dry towel. Do not apply lotions or moisturizer. 8. Put on clean clothes and sleep on fresh bed sheets the night before surgery. Do not allow pets to sleep with you. Eating and Drinking Before Surgery     You may eat a regular dinner at the usual time on the day before your surgery.  Do NOT eat any solid foods after midnight unless your arrival time at the hospital is 3pm or later.    You may drink clear liquids only from 12 midnight until 1 hours prior to your arrival time at the hospital on the day of your surgery. Do NOT drink alcohol.  Clear liquids include:  o Water  o Fruit juices without pulp( i.e. apple juice)  o Carbonated beverages  o Black coffee (no cream/milk)  o Tea (no cream/milk)  o Gatorade   You may drink up to 12-16 ounces at one time every 4 hours between the hours of midnight and 1 hour before your arrival time at the hospital. Example- if your arrival time at the hospital is 6am, you may drink 12-16 ounces of clear liquids no later than 5am.   If your arrival time at the hospital is 3pm or later, you may eat a light breakfast before 8am.   A light breakfast includes:  o Toast or bagel (no butter)  o Black coffee (no cream/milk)  o Tea (no cream/milk)  o Fruit juices without pulp ( i.e. apple juice)  o Do NOT eat meat, eggs, vegetables or fruit   If you have any questions, please contact your surgeon's office. [facility]   Instructions for Pre-Surgery COVID-19 Testing     Across our ministry we have established standard guidelines to ensure the health and safety of our patients, residents and associates as we resume elective services for patients. All patients presenting for surgery are required to have a COVID-19 test result within 96 hours of their scheduled surgery. [facility] is providing this test free of charge to the patient.    Instructions for COVID-19 Testing:     Patients will receive a call from Pre-Admission Testing 4-5 days prior to surgery to schedule a date and time to come to the [address] [place] [address] Drive for their COVID-19 test   Patients are advised to self-quarantine after testing until their scheduled surgery   Once on site, patients will be registered and receive COVID test in their vehicle   If a patient is scheduled for normal Pre Admission Testing 96 hours from date of surgery, the patient will still have their COVID test done at the [address] [place] [address] located at [address] [place] [address] Positive results will be shared with the surgeon and anesthesiologist and may result in cancellation of the elective procedure    Testing Hours and Location:   Address:  [address] Admission [facility] in the Discharge Lot on [address] [place] [address] (Map Attached)  [facility], [address]   Hours: Monday- Friday 7a-3p    PAT Phone Number: [phone]            Patient Information Regarding COVID Restrictions    Patients are advised to self-quarantine after COVID testing up to the day of the scheduled procedure. Day of Procedure     Please park in the parking deck or any designated visitor parking lot.  Enter the facility through the Main Entrance of the hospital.   A temperature check and appropriate symptom/exposure screening will be done prior to entry to the facility.  On the day of surgery, please provide the cell phone number of the person who will be waiting for you to the Patient Access representative at the time of registration.  Please wear a mask on the day of your procedure.  We are now allowing one designated visitor per stay. Pediatric patients may have 2 designated visitors. This one person may come in with you on the day of your procedure.  No visitors under the age of 13.  The designated visitor must also wear a mask.  Once your procedure and the immediate recovery period is completed, a nurse in the recovery area will contact your designated visitor to inform them of your room number or to otherwise review other pertinent information regarding your care.  Social distancing practices are to be adhered to in waiting areas and the cafeteria. The patient was contacted in person. She verbalized understanding of all instructions does not  need reinforcement.

## 2022-02-02 LAB
BACTERIA SPEC CULT: NORMAL
BACTERIA SPEC CULT: NORMAL
SARS-COV-2, XPLCVT: DETECTED
SERVICE CMNT-IMP: NORMAL
SOURCE, COVRS: ABNORMAL

## 2022-02-02 NOTE — PERIOP NOTES
PAT Nurse Practitioner   Pre-Operative Chart Review/Assessment:-ORTHOPEDIC                Patient Name:  William Gonsalez                                                           Age:   68 y.o.    :  1945     Today's Date:  2/3/2022     Date of PAT:   2022      Date of Surgery:    2022 RESCHEDULED to  d/t +COVID swab     Procedure(s):  Left Total Knee Arthroplasty     Surgeon:   Dr. Gely Avila                       PLAN:      1)  Medical Clearance:  Dr. Candie Carroll      2)  Cardiac Clearance:  EKG and METs reviewed. No further cardiac evaluation requested. 3)  Diabetic Treatment Consult:  Not indicated. A1c-5.4      4)  Sleep Apnea evaluation:   Not indicated. LUI Score 2.       5) Treatment for MRSA/Staph Aureus:  Neg      6) Additional Concerns:  PONV, HTN, GERD, hx of TIA                Vital Signs:         Vitals:    22 1145   BP: 126/76   Pulse: 83   Resp: 20   Temp: 98 °F (36.7 °C)   Weight: 67.9 kg (149 lb 12.8 oz)   Height: 5' 2\" (1.575 m)            ____________________________________________  PAST MEDICAL HISTORY  Past Medical History:   Diagnosis Date    Arthritis     Diverticulitis     HAS HAD NO EPISODES SINCE COLOSTOMY REVERSAL    GERD (gastroesophageal reflux disease)     Hypertension     Menopause     Nausea & vomiting     PONV    Stroke ([address] 2016    TIA ;possible association to prempro. ; NO RESIDUAL DEFICITS      ____________________________________________  PAST SURGICAL HISTORY  Past Surgical History:   Procedure Laterality Date    COLONOSCOPY N/A 8/3/2021    COLONOSCOPY performed by Dariela Teran MD at [address] HX CATARACT REMOVAL      right eye cataract removed W/ IOL    HX COLONOSCOPY      HX DILATION AND CURETTAGE      MISCARRIAGE IN HER 20s    HX GI  ?     colostomy for diverticultis and then a reversal    HX GI      COLONOSCOPY    HX KNEE ARTHROSCOPY Left 2018    MENISCUS    HX OTHER SURGICAL Left     cyst removed from wrist    HX OTHER SURGICAL  02/2017    LOWER FACE LIFT    HX TONSILLECTOMY      HX TUBAL LIGATION      [place] ABDOMEN SURGERY PROC UNLISTED  07/05/2017    LIPOSUCTION TWO SITES ABDOMEN AND BILATERAL FLANKS, SCAR REVISION ABDOMEN  SCAR REVISION TRUNK       ____________________________________________  HOME MEDICATIONS  Current Outpatient Medications   Medication Sig    pantoprazole (Protonix) 40 mg tablet Take 40 mg by mouth daily.  mirabegron ER (Myrbetriq) 25 mg ER tablet Take 25 mg by mouth daily.  rosuvastatin (CRESTOR) 20 mg tablet Take 20 mg by mouth daily.  Biotin 2,500 mcg cap Take  by mouth daily.  cholecalciferol (Vitamin D3) (1000 Units /25 mcg) tablet Take 1,000 Units by mouth daily.  valsartan-hydroCHLOROthiazide (DIOVAN-HCT) 160-12.5 mg per tablet Take 1 Tablet by mouth daily.      No current facility-administered medications for this encounter.      ____________________________________________  ALLERGIES  Allergies   Allergen Reactions    Codeine Nausea and Vomiting    Morphine Hives    Pcn [Penicillins] Rash     PATIENT DOES NOT REALLY REMEMBER REACTION    Prempro [Conj Estrog-Medroxyprogest Ace] Other (comments)     Possible TIA    Shellfish Derived Hives and Angioedema     Topical iodine ok on skin      ____________________________________________  SOCIAL HISTORY  Social History     Tobacco Use    Smoking status: Never Smoker    Smokeless tobacco: Never Used   Substance Use Topics    Alcohol use: Yes     Comment: OCCASIONALLY      ____________________________________________   Internal Administration   First Dose COVID-19, Vandana , Primary or Immunocompromised Series, MRNA, PF, 100mcg/0.5mL  09/06/2021   Second Dose COVID-19, Vandana , Primary or Immunocompromised Series, MRNA, PF, 100mcg/0.5mL  10/18/2021      Last COVID Lab SARS-CoV-2 ( )   Date Value   02/01/2022 Detected (A)        Labs:     Hospital Outpatient Visit on 02/01/2022   Component Date Value Ref Range Status    Specimen source 02/01/2022 Nasopharyngeal    Final    SARS-CoV-2 02/01/2022 Detected* NOTD   Final    Comment:      The specimen is POSITIVE for SARS-CoV-2, the novel coronavirus associated with COVID-19. Zulay SARS-CoV-2 for use on the Zulay 6800/8800 Systems is a real-time RT-PCR test intended for the qualitative detection of nucleic acids from SARS-CoV-2  in clinician-collected nasal, nasopharyngeal,and oropharyngeal swab specimens from individuals who meet COVID-19 clinical and/or epidemiological criteria. Zulay SARS-CoV-2 is for use only under Emergency Use Authorization (EUA) in laboratories certified under [address] (CLIA), 42 [place]. 407C, that meet requirements to perform high or moderate complexity tests. An individual without symptoms of COVID-19 and who is not shedding SARS-CoV-2 virus would expect to have a negative (not detected) result in this assay.   Fact sheet for Healthcare Providers: ConventionUpdate.co.nz  Fact sheet for Patients: ConventionUpdate.co.nz       Methodology: RT-PCR                                  Results reported to the appropriate [address] Outpatient Visit on 02/01/2022   Component Date Value Ref Range Status    Sodium 02/01/2022 139  136 - 145 mmol/L Final    Potassium 02/01/2022 3.8  3.5 - 5.1 mmol/L Final    Chloride 02/01/2022 106  97 - 108 mmol/L Final    CO2 02/01/2022 31  21 - 32 mmol/L Final    Anion gap 02/01/2022 2* 5 - 15 mmol/L Final    Glucose 02/01/2022 105* 65 - 100 mg/dL Final    BUN 02/01/2022 22* 6 - 20 MG/DL Final    Creatinine 02/01/2022 0.90  0.55 - 1.02 MG/DL Final    BUN/Creatinine ratio 02/01/2022 24* 12 - 20   Final    GFR est AA 02/01/2022 >60  >60 ml/min/1.73m2 Final    GFR est non-AA 02/01/2022 >60  >60 ml/min/1.73m2 Final    Estimated GFR is calculated using the IDMS-traceable Modification of Diet in Renal Disease (MDRD) Study equation, reported for both  Americans (GFRAA) and non- Americans (GFRNA), and normalized to 1.73m2 body surface area. The physician must decide which value applies to the patient.  Calcium 02/01/2022 9.1  8.5 - 10.1 MG/DL Final    WBC 02/01/2022 3.3* 3.6 - 11.0 K/uL Final    RBC 02/01/2022 4.71  3.80 - 5.20 M/uL Final    HGB 02/01/2022 14.5  11.5 - 16.0 g/dL Final    HCT 02/01/2022 44.6  35.0 - 47.0 % Final    MCV 02/01/2022 94.7  80.0 - 99.0 FL Final    MCH 02/01/2022 30.8  26.0 - 34.0 PG Final    MCHC 02/01/2022 32.5  30.0 - 36.5 g/dL Final    RDW 02/01/2022 12.1  11.5 - 14.5 % Final    PLATELET 35/35/4000 376  150 - 400 K/uL Final    MPV 02/01/2022 10.6  8.9 - 12.9 FL Final    NRBC 02/01/2022 0.0  0  WBC Final    ABSOLUTE NRBC 02/01/2022 0.00  0.00 - 0.01 K/uL Final    Crossmatch Expiration 02/01/2022 02/07/2022,2359   Final    ABO/Rh(D) 02/01/2022 A POSITIVE   Final    Antibody screen 02/01/2022 NEG   Final    INR 02/01/2022 1.0  0.9 - 1.1   Final    A single therapeutic range for Vit K antagonists may not be optimal for all indications - see June, 2008 issue of Chest, American College of Chest Physicians Evidence-Based Clinical Practice Guidelines, 8th Edition.     Prothrombin time 02/01/2022 10.2  9.0 - 11.1 sec Final    Color 02/01/2022 YELLOW/STRAW    Final    Color Reference Range: Straw, Yellow or Dark Yellow    Appearance 02/01/2022 CLEAR  CLEAR   Final    Specific gravity 02/01/2022 1.026  1.003 - 1.030   Final    pH (UA) 02/01/2022 6.5  5.0 - 8.0   Final    Protein 02/01/2022 Negative  NEG mg/dL Final    Glucose 02/01/2022 Negative  NEG mg/dL Final    Ketone 02/01/2022 Negative  NEG mg/dL Final    Bilirubin 02/01/2022 Negative  NEG   Final    Blood 02/01/2022 Negative  NEG   Final    Urobilinogen 02/01/2022 0.2  0.2 - 1.0 EU/dL Final    Nitrites 02/01/2022 Negative  NEG   Final    Leukocyte Esterase 02/01/2022 Negative  NEG   Final    UA: UC IF INDICATED 02/01/2022 CULTURE NOT INDICATED BY UA RESULT  CNI   Final    WBC 02/01/2022 0-4  0 - 4 /hpf Final    RBC 02/01/2022 0-5  0 - 5 /hpf Final    Epithelial cells 02/01/2022 FEW  FEW /lpf Final    Epithelial cell category consists of squamous cells and /or transitional urothelial cells. Renal tubular cells, if present, are separately identified as such.  Bacteria 02/01/2022 Negative  NEG /hpf Final    Hyaline cast 02/01/2022 2-5  0 - 5 /lpf Final    Ventricular Rate 02/01/2022 71  BPM Final    Atrial Rate 02/01/2022 71  BPM Final    P-R Interval 02/01/2022 152  ms Final    QRS Duration 02/01/2022 82  ms Final    Q-T Interval 02/01/2022 382  ms Final    QTC Calculation (Bezet) 02/01/2022 415  ms Final    Calculated P Axis 02/01/2022 61  degrees Final    Calculated R Axis 02/01/2022 34  degrees Final    Calculated T Axis 02/01/2022 56  degrees Final    Diagnosis 02/01/2022    Final                    Value:Normal sinus rhythm  Normal ECG  When compared with ECG of 05-MAR-2020 16:17,  No significant change was found  Confirmed by Terrence Avila M.D., [facility] ([id number]) on 2/1/2022 4:45:01 PM      Hemoglobin A1c 02/01/2022 5.4  4.0 - 5.6 % Final    Comment: NEW METHOD  PLEASE NOTE NEW REFERENCE RANGE  (NOTE)  HbA1C Interpretive Ranges  <5.7              Normal  5.7 - 6.4         Consider Prediabetes  >6.5              Consider Diabetes      Est. average glucose 02/01/2022 108  mg/dL Final    Special Requests: 02/01/2022 NO SPECIAL REQUESTS    Final    Culture result: 02/01/2022 MRSA NOT PRESENT    Final       Skin:     Denies open wounds, cuts, sores, rashes or other areas of concern in PAT assessment.           Renita Rosenbaum NP

## 2022-02-11 NOTE — PERIOP NOTES
PT TESTED POSITIVE FOR COVID ON 2/1/22 WITH MILD SYMPTOMS-NO NEED TO RETEST PER CURRENT COVID GUIDELINES.

## 2022-02-24 NOTE — H&P
Signed        Expand All Collapse All    Subjective:   Patient ID: Neo Hammer is a 68 y.o. female. Pain Score: Data Unavailable  Chief Complaint: Follow-up of the Left Knee        HPI: Neo Hammer is a 68 y.o. female who returns for follow-up of her bilateral knees. She is s/p a left knee arthroscopy and partial medial menisectomy performed by me around 3 years ago on 12/20/18. She has a documented history of bilateral knee osteoarthritis. She notes her left knee is much more painful than the right. She complains of constant pain in the bilateral knees which severely impacts her quality of life. She states she is unable to walk for very long without severe pain. She reports the past steroid injections to her bilateral knees provided significant but temporary pain relief. She states she takes Aleve which occasionally leads to pain relief. She is ambulating without assistance today. She has not responded to well anti-inflammatories or to injections as well as activity modification. She is no longer able to her normal activities of daily living without significant pain and wake her from sleep at night.   She was talk about more aggressive intervention.     Of note, she reports she is not working from mid-January to mid-March 2022.     Medical History        Past Medical History:   Diagnosis Date    TIA (transient ischemic attack)           Surgical History   Past Surgical History:   Procedure Laterality Date    KNEE SURGERY        NO RELEVANT SURGERIES                   Family History   Problem Relation Age of Onset    No Known Problems Mother      No Known Problems Father      No Known Problems Brother      No Known Problems Sister      No Known Problems Son      No Known Problems Daughter      Diabetes Neg Hx      Coronary artery disease Neg Hx      Clotting disorder Neg Hx      Anesthesia problems Neg Hx        [unfilled]  Review of Systems   11/29/2021     Constitutional: Unexplained: Negative  Genitourinary: Frequent Urination: Negative  HEENT: Vision Loss: Negative  Neurological: Memory Loss: Negative  Integumentary: Rash: Negative  Cardiovascular: Palpatations: Negative  Hematologic: Bruises/Bleeds Easily: Negative  Gastrointestinal: Constipation: Negative  Immunological: Seasonal Allergies: Negative  Musculoskeletal: Joint Pain: Positive  Objective:      Vitals       Vitals:     11/29/21 1035   Weight: 140 lb   Height: 5' 2\"         Constitutional:  No acute distress. Well nourished. Well developed. Psychiatric: Alert and oriented x3. Skin:  No marked skin ulcers. Neurological:  No apparent deficits       Patient Active Problem List    Diagnosis Date Noted    Left knee DJD 02/25/2022    Mixed incontinence 05/07/2019    Transient cerebral ischemia 06/20/2017    Chest pain 06/20/2017    Dyslipidemia 06/20/2017    Encounter for cosmetic surgery 01/27/2017     Past Medical History:   Diagnosis Date    Arthritis     Diverticulitis 2009    HAS HAD NO EPISODES SINCE COLOSTOMY REVERSAL    GERD (gastroesophageal reflux disease)     Hypertension     Menopause     Nausea & vomiting     PONV    Stroke ([facility] [address] 08/2016    TIA ;possible association to prempro. ; NO RESIDUAL DEFICITS      Past Surgical History:   Procedure Laterality Date    COLONOSCOPY N/A 8/3/2021    [facility] at [facility] ENDOSCOPY    HX CATARACT REMOVAL  2016    right eye cataract removed W/ IOL    HX COLONOSCOPY      HX DILATION AND CURETTAGE      MISCARRIAGE IN HER 20s    HX GI  2009?     colostomy for diverticultis and then a reversal    HX GI      COLONOSCOPY    HX KNEE ARTHROSCOPY Left 12/2018    MENISCUS    HX OTHER SURGICAL Left     cyst removed from wrist    HX OTHER SURGICAL  02/2017    LOWER FACE LIFT    HX TONSILLECTOMY      HX TUBAL LIGATION      [place] ABDOMEN SURGERY PROC UNLISTED  07/05/2017    LIPOSUCTION TWO SITES ABDOMEN AND BILATERAL FLANKS, SCAR REVISION ABDOMEN  SCAR REVISION TRUNK       Prior to Admission medications    Medication Sig Start Date End Date Taking? Authorizing Provider   pantoprazole (Protonix) 40 mg tablet Take 40 mg by mouth daily. Yes Provider, Historical   mirabegron ER (Myrbetriq) 25 mg ER tablet Take 25 mg by mouth daily. Yes Provider, Historical   rosuvastatin (CRESTOR) 20 mg tablet Take 20 mg by mouth daily. Yes Provider, Historical   Biotin 2,500 mcg cap Take  by mouth daily. Yes Provider, Historical   cholecalciferol (Vitamin D3) (1000 Units /25 mcg) tablet Take 1,000 Units by mouth daily. Yes Provider, Historical   valsartan-hydroCHLOROthiazide (DIOVAN-HCT) 160-12.5 mg per tablet Take 1 Tablet by mouth daily. Yes Provider, Historical     Allergies   Allergen Reactions    Codeine Nausea and Vomiting    Morphine Hives    Pcn [Penicillins] Rash     PATIENT DOES NOT REALLY REMEMBER REACTION    Prempro [Conj Estrog-Medroxyprogest Ace] Other (comments)     Possible TIA    Shellfish Derived Hives and Angioedema     Topical iodine ok on skin      Social History     Tobacco Use    Smoking status: Never Smoker    Smokeless tobacco: Never Used   Substance Use Topics    Alcohol use: Yes     Comment: OCCASIONALLY      Family History   Problem Relation Age of Onset    Cancer Mother         lymphoma    Cancer Father         bladder    Hypertension Father     Hypertension Paternal Uncle     Hypertension Paternal Grandfather     Hypertension Sister     Anesth Problems Neg Hx             Patient Vitals for the past 8 hrs:   BP Temp Pulse Resp SpO2   02/25/22 1144 -- 98.3 °F (36.8 °C) -- -- --   02/25/22 1137 (!) 150/79 -- 86 20 100 %     General appearance: alert, cooperative, no distress, appears stated age  Head: Normocephalic, without obvious abnormality, atraumatic  Lungs: clear to auscultation bilaterally  Heart: regular rate and rhythm, S1, S2 normal, no murmur  Abdomen: soft, non-tender.  Bowel sounds normal. No masses,  no organomegaly  Extremities: Left Knee: -3 degrees extension to full flexion. Mild to moderate effusion. Medial joint line tenderness. Pseudo-opening on valgus stress testing. Pulses: 2+ and symmetric  Neurologic: Grossly normal             Radiographs:    X-rays from prior reviewed show significant evidence of medial compartment arthritis of the left knee with bone-on-bone contact and complete loss of joint space. She has significant medial shift of the femur on the tibia with significant medial tibial and femoral spurring.     Assessment:      1. Primary osteoarthritis of left knee    2. Primary osteoarthritis of right knee           Patient Active Problem List   Diagnosis    Dyslipidemia    Acute medial meniscus tear, left, initial encounter      Plan:      I discussed the continued diagnosis of bilateral knee osteoarthritis with the patient. I discussed treatment options including a prescription anti-inflammatory vs repeat steroid injections to the bilateral knees vs gel / PRP / stem cell injections vs a left knee medial  brace vs a left total knee replacement and the pros and cons of each. She has failed conservative management with anti-inflammatories activity modification injection rest and bracing and now is ready to move on to the next step. Her x-ray show significant medial compartment arthritis with medial shift femur on the tibia, complete loss of medial joint space, and medial joint line spurring. She is ready for knee replacement she says.     Total knee replacement was discussed with the patient today including the risks benefits and possible complications. It was discussed with them that the surgery is done on same day surgery basis. The patient will arrive the day of surgery. Surgery will be done under spinal and block with sedation. The main risks of the operation are blood loss infection and persistent pain.  Surgery would last approximately an hour and a half the patient was then go to the floor with a would be expected to walk on the 1st day. Prior to discharge that would be required to walk a certain distance be able to get up on their own and ambulate stairs. Patient would be required to be on a blood thinner after surgery. This will be required for at least 4 weeks postop. Complete recovery is expected take 3-6 months. All this was explained to the patient and they voiced complete understanding. It would be expected that the patient would require postoperative pain medicine for up to 8 weeks after surgery. The patient wishes to proceed with left total knee replacement. We will schedule surgery when it is convenient for her. Follow-up for scheduled surgery.          Orders Placed This Encounter    BMI >=25 PATIENT INSTRUCTIONS & EDUCATION      Return for Surgery. Medical documentation was entered into the chart by me, Erma Pandya MD medical scribe for Dr. Valerie Morales, Chiquita Ornelas MD, personally, performed the services described in this documentation, as scribed in my presence, and it is both accurate and complete. Scribed by: Erma Pandya MD                    Patient was seen and examined. There have been no significant clinical changes since the completion of the above History and Physical.  Patient identified by surgeon; surgical site was confirmed by patient and surgeon.

## 2022-02-25 ENCOUNTER — ANESTHESIA EVENT (OUTPATIENT)
Dept: SURGERY | Age: 77
End: 2022-02-25
Payer: MEDICARE

## 2022-02-25 ENCOUNTER — ANESTHESIA (OUTPATIENT)
Dept: SURGERY | Age: 77
End: 2022-02-25
Payer: MEDICARE

## 2022-02-25 ENCOUNTER — APPOINTMENT (OUTPATIENT)
Dept: GENERAL RADIOLOGY | Age: 77
End: 2022-02-25
Attending: ORTHOPAEDIC SURGERY
Payer: MEDICARE

## 2022-02-25 ENCOUNTER — HOSPITAL ENCOUNTER (OUTPATIENT)
Age: 77
Setting detail: OBSERVATION
Discharge: HOME HEALTH | End: 2022-02-26
Attending: ORTHOPAEDIC SURGERY | Admitting: ORTHOPAEDIC SURGERY
Payer: MEDICARE

## 2022-02-25 DIAGNOSIS — M17.12 PRIMARY OSTEOARTHRITIS OF LEFT KNEE: Primary | ICD-10-CM

## 2022-02-25 LAB
ABO + RH BLD: NORMAL
BLOOD GROUP ANTIBODIES SERPL: NORMAL
GLUCOSE BLD STRIP.AUTO-MCNC: 103 MG/DL (ref 65–117)
SERVICE CMNT-IMP: NORMAL
SPECIMEN EXP DATE BLD: NORMAL

## 2022-02-25 PROCEDURE — 77030006835 HC BLD SAW SAG STRY -B: Performed by: ORTHOPAEDIC SURGERY

## 2022-02-25 PROCEDURE — 74011000250 HC RX REV CODE- 250: Performed by: ANESTHESIOLOGY

## 2022-02-25 PROCEDURE — 74011000272 HC RX REV CODE- 272: Performed by: ORTHOPAEDIC SURGERY

## 2022-02-25 PROCEDURE — 77030002933 HC SUT MCRYL J&J -A: Performed by: ORTHOPAEDIC SURGERY

## 2022-02-25 PROCEDURE — 74011250636 HC RX REV CODE- 250/636: Performed by: ANESTHESIOLOGY

## 2022-02-25 PROCEDURE — 74011250636 HC RX REV CODE- 250/636: Performed by: PHYSICIAN ASSISTANT

## 2022-02-25 PROCEDURE — 74011250636 HC RX REV CODE- 250/636: Performed by: NURSE ANESTHETIST, CERTIFIED REGISTERED

## 2022-02-25 PROCEDURE — 77030005513 HC CATH URETH FOL11 MDII -B: Performed by: ORTHOPAEDIC SURGERY

## 2022-02-25 PROCEDURE — C1776 JOINT DEVICE (IMPLANTABLE): HCPCS | Performed by: ORTHOPAEDIC SURGERY

## 2022-02-25 PROCEDURE — 74011000250 HC RX REV CODE- 250

## 2022-02-25 PROCEDURE — 86900 BLOOD TYPING SEROLOGIC ABO: CPT

## 2022-02-25 PROCEDURE — 77030000032 HC CUF TRNQT ZIMM -B: Performed by: ORTHOPAEDIC SURGERY

## 2022-02-25 PROCEDURE — 74011250637 HC RX REV CODE- 250/637

## 2022-02-25 PROCEDURE — 77030007866 HC KT SPN ANES BBMI -B: Performed by: ANESTHESIOLOGY

## 2022-02-25 PROCEDURE — C1713 ANCHOR/SCREW BN/BN,TIS/BN: HCPCS | Performed by: ORTHOPAEDIC SURGERY

## 2022-02-25 PROCEDURE — 77030006822 HC BLD SAW SAG BRSM -B: Performed by: ORTHOPAEDIC SURGERY

## 2022-02-25 PROCEDURE — 76210000016 HC OR PH I REC 1 TO 1.5 HR: Performed by: ORTHOPAEDIC SURGERY

## 2022-02-25 PROCEDURE — 74011250637 HC RX REV CODE- 250/637: Performed by: ORTHOPAEDIC SURGERY

## 2022-02-25 PROCEDURE — 77030035236 HC SUT PDS STRATFX BARB J&J -B: Performed by: ORTHOPAEDIC SURGERY

## 2022-02-25 PROCEDURE — 82962 GLUCOSE BLOOD TEST: CPT

## 2022-02-25 PROCEDURE — 74011250636 HC RX REV CODE- 250/636: Performed by: ORTHOPAEDIC SURGERY

## 2022-02-25 PROCEDURE — 74011250637 HC RX REV CODE- 250/637: Performed by: PHYSICIAN ASSISTANT

## 2022-02-25 PROCEDURE — G0378 HOSPITAL OBSERVATION PER HR: HCPCS

## 2022-02-25 PROCEDURE — 73560 X-RAY EXAM OF KNEE 1 OR 2: CPT

## 2022-02-25 PROCEDURE — 76060000035 HC ANESTHESIA 2 TO 2.5 HR: Performed by: ORTHOPAEDIC SURGERY

## 2022-02-25 PROCEDURE — 36415 COLL VENOUS BLD VENIPUNCTURE: CPT

## 2022-02-25 PROCEDURE — 74011000250 HC RX REV CODE- 250: Performed by: ORTHOPAEDIC SURGERY

## 2022-02-25 PROCEDURE — 76010000171 HC OR TIME 2 TO 2.5 HR INTENSV-TIER 1: Performed by: ORTHOPAEDIC SURGERY

## 2022-02-25 PROCEDURE — 77030039497 HC CST PAD STERILE CHCS -A: Performed by: ORTHOPAEDIC SURGERY

## 2022-02-25 PROCEDURE — 77030040361 HC SLV COMPR DVT MDII -B: Performed by: ORTHOPAEDIC SURGERY

## 2022-02-25 PROCEDURE — 77030031139 HC SUT VCRL2 J&J -A: Performed by: ORTHOPAEDIC SURGERY

## 2022-02-25 PROCEDURE — 2709999900 HC NON-CHARGEABLE SUPPLY: Performed by: ORTHOPAEDIC SURGERY

## 2022-02-25 PROCEDURE — 77030003601 HC NDL NRV BLK BBMI -A

## 2022-02-25 PROCEDURE — C9290 INJ, BUPIVACAINE LIPOSOME: HCPCS | Performed by: ORTHOPAEDIC SURGERY

## 2022-02-25 PROCEDURE — 77030014077 HC TOWER MX CEM J&J -C: Performed by: ORTHOPAEDIC SURGERY

## 2022-02-25 PROCEDURE — 77030040922 HC BLNKT HYPOTHRM STRY -A

## 2022-02-25 PROCEDURE — 74011000258 HC RX REV CODE- 258: Performed by: ORTHOPAEDIC SURGERY

## 2022-02-25 DEVICE — IMPLANTABLE DEVICE
Type: IMPLANTABLE DEVICE | Site: KNEE | Status: FUNCTIONAL
Brand: PERSONA® NATURAL TIBIA®

## 2022-02-25 DEVICE — IMPLANTABLE DEVICE
Type: IMPLANTABLE DEVICE | Site: KNEE | Status: FUNCTIONAL
Brand: PERSONA®

## 2022-02-25 DEVICE — IMPLANTABLE DEVICE
Type: IMPLANTABLE DEVICE | Site: KNEE | Status: FUNCTIONAL
Brand: PERSONA® VIVACIT-E®

## 2022-02-25 DEVICE — KNEE K1 TOT HEMI STD CEM IMPL CAPPED K1 ZIM: Type: IMPLANTABLE DEVICE | Site: KNEE | Status: FUNCTIONAL

## 2022-02-25 DEVICE — SMARTSET HIGH PERFORMANCE MV MEDIUM VISCOSITY BONE CEMENT 40G
Type: IMPLANTABLE DEVICE | Site: KNEE | Status: FUNCTIONAL
Brand: SMARTSET

## 2022-02-25 RX ORDER — LIDOCAINE HYDROCHLORIDE 10 MG/ML
0.1 INJECTION, SOLUTION EPIDURAL; INFILTRATION; INTRACAUDAL; PERINEURAL AS NEEDED
Status: DISCONTINUED | OUTPATIENT
Start: 2022-02-25 | End: 2022-02-25 | Stop reason: HOSPADM

## 2022-02-25 RX ORDER — ONDANSETRON 2 MG/ML
4 INJECTION INTRAMUSCULAR; INTRAVENOUS
Status: DISCONTINUED | OUTPATIENT
Start: 2022-02-25 | End: 2022-02-26 | Stop reason: HOSPADM

## 2022-02-25 RX ORDER — SODIUM CHLORIDE 9 MG/ML
125 INJECTION, SOLUTION INTRAVENOUS CONTINUOUS
Status: DISCONTINUED | OUTPATIENT
Start: 2022-02-25 | End: 2022-02-26 | Stop reason: HOSPADM

## 2022-02-25 RX ORDER — OXYCODONE HYDROCHLORIDE 5 MG/1
10 TABLET ORAL
Status: DISCONTINUED | OUTPATIENT
Start: 2022-02-25 | End: 2022-02-26 | Stop reason: HOSPADM

## 2022-02-25 RX ORDER — SODIUM CHLORIDE 0.9 % (FLUSH) 0.9 %
5-40 SYRINGE (ML) INJECTION AS NEEDED
Status: DISCONTINUED | OUTPATIENT
Start: 2022-02-25 | End: 2022-02-25 | Stop reason: HOSPADM

## 2022-02-25 RX ORDER — HYDROMORPHONE HYDROCHLORIDE 1 MG/ML
0.5 INJECTION, SOLUTION INTRAMUSCULAR; INTRAVENOUS; SUBCUTANEOUS
Status: DISCONTINUED | OUTPATIENT
Start: 2022-02-25 | End: 2022-02-26 | Stop reason: HOSPADM

## 2022-02-25 RX ORDER — SODIUM CHLORIDE, SODIUM LACTATE, POTASSIUM CHLORIDE, CALCIUM CHLORIDE 600; 310; 30; 20 MG/100ML; MG/100ML; MG/100ML; MG/100ML
50 INJECTION, SOLUTION INTRAVENOUS CONTINUOUS
Status: DISCONTINUED | OUTPATIENT
Start: 2022-02-25 | End: 2022-02-25 | Stop reason: HOSPADM

## 2022-02-25 RX ORDER — AMOXICILLIN 250 MG
1 CAPSULE ORAL 2 TIMES DAILY
Status: DISCONTINUED | OUTPATIENT
Start: 2022-02-25 | End: 2022-02-26 | Stop reason: HOSPADM

## 2022-02-25 RX ORDER — BUPIVACAINE HYDROCHLORIDE 5 MG/ML
INJECTION, SOLUTION EPIDURAL; INTRACAUDAL
Status: COMPLETED | OUTPATIENT
Start: 2022-02-25 | End: 2022-02-25

## 2022-02-25 RX ORDER — ACETAMINOPHEN 500 MG
1000 TABLET ORAL EVERY 6 HOURS
Status: DISCONTINUED | OUTPATIENT
Start: 2022-02-25 | End: 2022-02-26 | Stop reason: HOSPADM

## 2022-02-25 RX ORDER — DIPHENHYDRAMINE HYDROCHLORIDE 50 MG/ML
12.5 INJECTION, SOLUTION INTRAMUSCULAR; INTRAVENOUS
Status: DISCONTINUED | OUTPATIENT
Start: 2022-02-25 | End: 2022-02-26 | Stop reason: HOSPADM

## 2022-02-25 RX ORDER — DEXAMETHASONE SODIUM PHOSPHATE 4 MG/ML
INJECTION, SOLUTION INTRA-ARTICULAR; INTRALESIONAL; INTRAMUSCULAR; INTRAVENOUS; SOFT TISSUE AS NEEDED
Status: DISCONTINUED | OUTPATIENT
Start: 2022-02-25 | End: 2022-02-25 | Stop reason: HOSPADM

## 2022-02-25 RX ORDER — MIDAZOLAM HYDROCHLORIDE 1 MG/ML
1 INJECTION, SOLUTION INTRAMUSCULAR; INTRAVENOUS AS NEEDED
Status: DISCONTINUED | OUTPATIENT
Start: 2022-02-25 | End: 2022-02-25 | Stop reason: HOSPADM

## 2022-02-25 RX ORDER — TETRACAINE HYDROCHLORIDE 5 MG/ML
1 SOLUTION OPHTHALMIC ONCE
Status: COMPLETED | OUTPATIENT
Start: 2022-02-25 | End: 2022-02-25

## 2022-02-25 RX ORDER — ERYTHROMYCIN 5 MG/G
OINTMENT OPHTHALMIC EVERY 8 HOURS
Status: DISCONTINUED | OUTPATIENT
Start: 2022-02-25 | End: 2022-02-26 | Stop reason: HOSPADM

## 2022-02-25 RX ORDER — CELECOXIB 200 MG/1
200 CAPSULE ORAL 2 TIMES DAILY
Status: DISCONTINUED | OUTPATIENT
Start: 2022-02-25 | End: 2022-02-26 | Stop reason: HOSPADM

## 2022-02-25 RX ORDER — PROPOFOL 10 MG/ML
INJECTION, EMULSION INTRAVENOUS
Status: DISCONTINUED | OUTPATIENT
Start: 2022-02-25 | End: 2022-02-25 | Stop reason: HOSPADM

## 2022-02-25 RX ORDER — SODIUM CHLORIDE 0.9 % (FLUSH) 0.9 %
5-40 SYRINGE (ML) INJECTION EVERY 8 HOURS
Status: DISCONTINUED | OUTPATIENT
Start: 2022-02-25 | End: 2022-02-26 | Stop reason: HOSPADM

## 2022-02-25 RX ORDER — HYDROMORPHONE HYDROCHLORIDE 1 MG/ML
0.2 INJECTION, SOLUTION INTRAMUSCULAR; INTRAVENOUS; SUBCUTANEOUS
Status: DISCONTINUED | OUTPATIENT
Start: 2022-02-25 | End: 2022-02-25 | Stop reason: HOSPADM

## 2022-02-25 RX ORDER — NALOXONE HYDROCHLORIDE 0.4 MG/ML
0.4 INJECTION, SOLUTION INTRAMUSCULAR; INTRAVENOUS; SUBCUTANEOUS AS NEEDED
Status: DISCONTINUED | OUTPATIENT
Start: 2022-02-25 | End: 2022-02-26 | Stop reason: HOSPADM

## 2022-02-25 RX ORDER — OXYCODONE HYDROCHLORIDE 5 MG/1
5 TABLET ORAL
Status: DISCONTINUED | OUTPATIENT
Start: 2022-02-25 | End: 2022-02-26 | Stop reason: HOSPADM

## 2022-02-25 RX ORDER — POLYETHYLENE GLYCOL 3350 17 G/17G
17 POWDER, FOR SOLUTION ORAL DAILY
Status: DISCONTINUED | OUTPATIENT
Start: 2022-02-26 | End: 2022-02-26 | Stop reason: HOSPADM

## 2022-02-25 RX ORDER — PREGABALIN 75 MG/1
75 CAPSULE ORAL ONCE
Status: COMPLETED | OUTPATIENT
Start: 2022-02-25 | End: 2022-02-25

## 2022-02-25 RX ORDER — SODIUM CHLORIDE 0.9 % (FLUSH) 0.9 %
5-40 SYRINGE (ML) INJECTION EVERY 8 HOURS
Status: DISCONTINUED | OUTPATIENT
Start: 2022-02-25 | End: 2022-02-25 | Stop reason: HOSPADM

## 2022-02-25 RX ORDER — FENTANYL CITRATE 50 UG/ML
50 INJECTION, SOLUTION INTRAMUSCULAR; INTRAVENOUS AS NEEDED
Status: DISCONTINUED | OUTPATIENT
Start: 2022-02-25 | End: 2022-02-25 | Stop reason: HOSPADM

## 2022-02-25 RX ORDER — ACETAMINOPHEN 325 MG/1
650 TABLET ORAL ONCE
Status: DISCONTINUED | OUTPATIENT
Start: 2022-02-25 | End: 2022-02-25 | Stop reason: SDUPTHER

## 2022-02-25 RX ORDER — CELECOXIB 200 MG/1
200 CAPSULE ORAL ONCE
Status: COMPLETED | OUTPATIENT
Start: 2022-02-25 | End: 2022-02-25

## 2022-02-25 RX ORDER — ROSUVASTATIN CALCIUM 10 MG/1
5 TABLET, COATED ORAL DAILY
Status: DISCONTINUED | OUTPATIENT
Start: 2022-02-26 | End: 2022-02-26 | Stop reason: HOSPADM

## 2022-02-25 RX ORDER — ONDANSETRON 2 MG/ML
INJECTION INTRAMUSCULAR; INTRAVENOUS AS NEEDED
Status: DISCONTINUED | OUTPATIENT
Start: 2022-02-25 | End: 2022-02-25 | Stop reason: HOSPADM

## 2022-02-25 RX ORDER — FAMOTIDINE 20 MG/1
20 TABLET, FILM COATED ORAL 2 TIMES DAILY
Status: DISCONTINUED | OUTPATIENT
Start: 2022-02-25 | End: 2022-02-26 | Stop reason: HOSPADM

## 2022-02-25 RX ORDER — FACIAL-BODY WIPES
10 EACH TOPICAL DAILY PRN
Status: DISCONTINUED | OUTPATIENT
Start: 2022-02-27 | End: 2022-02-26 | Stop reason: HOSPADM

## 2022-02-25 RX ORDER — ASPIRIN 81 MG/1
81 TABLET ORAL 2 TIMES DAILY
Status: DISCONTINUED | OUTPATIENT
Start: 2022-02-25 | End: 2022-02-26 | Stop reason: HOSPADM

## 2022-02-25 RX ORDER — SODIUM CHLORIDE 0.9 % (FLUSH) 0.9 %
5-40 SYRINGE (ML) INJECTION AS NEEDED
Status: DISCONTINUED | OUTPATIENT
Start: 2022-02-25 | End: 2022-02-26 | Stop reason: HOSPADM

## 2022-02-25 RX ORDER — SODIUM CHLORIDE 9 MG/ML
INJECTION, SOLUTION INTRAVENOUS
Status: DISCONTINUED | OUTPATIENT
Start: 2022-02-25 | End: 2022-02-25 | Stop reason: HOSPADM

## 2022-02-25 RX ORDER — ONDANSETRON 2 MG/ML
4 INJECTION INTRAMUSCULAR; INTRAVENOUS AS NEEDED
Status: DISCONTINUED | OUTPATIENT
Start: 2022-02-25 | End: 2022-02-25 | Stop reason: HOSPADM

## 2022-02-25 RX ORDER — GUAIFENESIN 100 MG/5ML
81 LIQUID (ML) ORAL 2 TIMES DAILY
Qty: 60 TABLET | Refills: 0 | Status: SHIPPED
Start: 2022-02-25

## 2022-02-25 RX ORDER — ACETAMINOPHEN 500 MG
1000 TABLET ORAL ONCE
Status: COMPLETED | OUTPATIENT
Start: 2022-02-25 | End: 2022-02-25

## 2022-02-25 RX ORDER — FENTANYL CITRATE 50 UG/ML
25 INJECTION, SOLUTION INTRAMUSCULAR; INTRAVENOUS
Status: DISCONTINUED | OUTPATIENT
Start: 2022-02-25 | End: 2022-02-25 | Stop reason: HOSPADM

## 2022-02-25 RX ADMIN — FAMOTIDINE 20 MG: 20 TABLET, FILM COATED ORAL at 17:04

## 2022-02-25 RX ADMIN — TETRACAINE HYDROCHLORIDE 1 DROP: 5 SOLUTION OPHTHALMIC at 16:59

## 2022-02-25 RX ADMIN — VANCOMYCIN HYDROCHLORIDE 1000 MG: 1 INJECTION, POWDER, LYOPHILIZED, FOR SOLUTION INTRAVENOUS at 12:02

## 2022-02-25 RX ADMIN — PHENYLEPHRINE HYDROCHLORIDE 20 MCG/MIN: 10 INJECTION INTRAVENOUS at 12:35

## 2022-02-25 RX ADMIN — PROPOFOL 50 MCG/KG/MIN: 10 INJECTION, EMULSION INTRAVENOUS at 12:24

## 2022-02-25 RX ADMIN — ONDANSETRON HYDROCHLORIDE 4 MG: 2 INJECTION, SOLUTION INTRAMUSCULAR; INTRAVENOUS at 14:15

## 2022-02-25 RX ADMIN — FENTANYL CITRATE 100 MCG: 50 INJECTION, SOLUTION INTRAMUSCULAR; INTRAVENOUS at 12:14

## 2022-02-25 RX ADMIN — MINERAL OIL AND WHITE PETROLATUM: 150; 830 OINTMENT OPHTHALMIC at 16:59

## 2022-02-25 RX ADMIN — ASPIRIN 81 MG: 81 TABLET, COATED ORAL at 17:04

## 2022-02-25 RX ADMIN — BUPIVACAINE HYDROCHLORIDE 10 MG: 5 INJECTION, SOLUTION EPIDURAL; INTRACAUDAL; PERINEURAL at 12:29

## 2022-02-25 RX ADMIN — MIDAZOLAM 2 MG: 1 INJECTION INTRAMUSCULAR; INTRAVENOUS at 12:14

## 2022-02-25 RX ADMIN — ERYTHROMYCIN: 5 OINTMENT OPHTHALMIC at 16:59

## 2022-02-25 RX ADMIN — SODIUM CHLORIDE, POTASSIUM CHLORIDE, SODIUM LACTATE AND CALCIUM CHLORIDE 50 ML/HR: 600; 310; 30; 20 INJECTION, SOLUTION INTRAVENOUS at 11:55

## 2022-02-25 RX ADMIN — CELECOXIB 200 MG: 200 CAPSULE ORAL at 11:54

## 2022-02-25 RX ADMIN — SENNOSIDES AND DOCUSATE SODIUM 1 TABLET: 50; 8.6 TABLET ORAL at 17:04

## 2022-02-25 RX ADMIN — CELECOXIB 200 MG: 200 CAPSULE ORAL at 21:42

## 2022-02-25 RX ADMIN — ACETAMINOPHEN 1000 MG: 500 TABLET ORAL at 17:04

## 2022-02-25 RX ADMIN — PREGABALIN 75 MG: 75 CAPSULE ORAL at 11:54

## 2022-02-25 RX ADMIN — ACETAMINOPHEN 1000 MG: 500 TABLET ORAL at 11:54

## 2022-02-25 RX ADMIN — OXYCODONE 5 MG: 5 TABLET ORAL at 21:42

## 2022-02-25 RX ADMIN — SODIUM CHLORIDE, PRESERVATIVE FREE 10 ML: 5 INJECTION INTRAVENOUS at 21:41

## 2022-02-25 RX ADMIN — ERYTHROMYCIN: 5 OINTMENT OPHTHALMIC at 21:42

## 2022-02-25 RX ADMIN — SODIUM CHLORIDE, PRESERVATIVE FREE 10 ML: 5 INJECTION INTRAVENOUS at 17:07

## 2022-02-25 RX ADMIN — MEPERIDINE HYDROCHLORIDE 12.5 MG: 25 INJECTION, SOLUTION INTRAMUSCULAR; INTRAVENOUS; SUBCUTANEOUS at 14:55

## 2022-02-25 RX ADMIN — SODIUM CHLORIDE 125 ML/HR: 9 INJECTION, SOLUTION INTRAVENOUS at 15:00

## 2022-02-25 RX ADMIN — SODIUM CHLORIDE: 900 INJECTION, SOLUTION INTRAVENOUS at 14:31

## 2022-02-25 RX ADMIN — CEFAZOLIN 2 G: 1 INJECTION, POWDER, FOR SOLUTION INTRAMUSCULAR; INTRAVENOUS at 21:41

## 2022-02-25 RX ADMIN — DEXAMETHASONE SODIUM PHOSPHATE 4 MG: 4 INJECTION, SOLUTION INTRAMUSCULAR; INTRAVENOUS at 12:31

## 2022-02-25 NOTE — ANESTHESIA PREPROCEDURE EVALUATION
Relevant Problems   No relevant active problems       Anesthetic History   No history of anesthetic complications            Review of Systems / Medical History  Patient summary reviewed, nursing notes reviewed and pertinent labs reviewed    Pulmonary  Within defined limits                 Neuro/Psych         TIA     Cardiovascular    Hypertension          Hyperlipidemia    Exercise tolerance: >4 METS     GI/Hepatic/Renal     GERD: well controlled           Endo/Other        Arthritis     Other Findings              Physical Exam    Airway  Mallampati: II  TM Distance: 4 - 6 cm  Neck ROM: normal range of motion   Mouth opening: Normal     Cardiovascular    Rhythm: regular  Rate: normal         Dental    Dentition: Caps/crowns     Pulmonary  Breath sounds clear to auscultation               Abdominal  GI exam deferred       Other Findings            Anesthetic Plan    ASA: 2  Anesthesia type: MAC, spinal and general - backup      Post-op pain plan if not by surgeon: peripheral nerve block single    Induction: Intravenous  Anesthetic plan and risks discussed with: Patient

## 2022-02-25 NOTE — ANESTHESIA PROCEDURE NOTES
Spinal Block    Start time: 2/25/2022 12:24 PM  End time: 2/25/2022 12:30 PM  Performed by: Lina Murphy CRNA  Authorized by: Emil Schilder, DO     Pre-procedure:   Indications: primary anesthetic  Preanesthetic Checklist: patient identified, risks and benefits discussed, anesthesia consent, site marked, patient being monitored and timeout performed    Timeout Time: 12:23 EST          Spinal Block:   Patient Position:  Seated  Prep Region:  Lumbar  Prep: Betadine      Location:  L3-4  Technique:  Single shot    Local Dose (mL):  2    Needle:   Needle Type:  Pencil-tip  Needle Gauge:  24 G  Attempts:  1      Events: CSF confirmed, no blood with aspiration and no paresthesia        Assessment:  Insertion:  Uncomplicated  Patient tolerance:  Patient tolerated the procedure well with no immediate complications

## 2022-02-25 NOTE — PROGRESS NOTES
Bedside and Verbal shift change report given to [facility] (oncoming nurse) by Genaro Michaels (offgoing nurse). Report included the following information SBAR, Kardex and OR Summary.

## 2022-02-25 NOTE — PROGRESS NOTES
Patient complaining of burning pain to right eye following surgery. Denies vision change or change to visual acuity but with pain to R eye since waking up from surgery. For patient comfort, ordered tetracaine once to R eye + erythromycin TID OD and PRN refresh ointment to OU. Patient and RN aware to only use tetracaine drop once for patient comfort.

## 2022-02-25 NOTE — DISCHARGE INSTRUCTIONS
After Hospital Care Plan:  Discharge Instructions Knee Replacement-Dr. Oracio Bloom      Patient Name: Fadi Montoya  Date of procedure: 2/25/2022   Procedure: Procedure(s):  LEFT TOTAL KNEE REPLACEMENT  Surgeon: Lexus Cormier) and Role:     Cari Hahn MD (Jody) - Primary    PCP: Toña Fitzpatrick  Date of discharge: No discharge date for patient encounter. Diet  Regular diet or usual diet as at home. Drink plenty of fluids. Eat foods high in fiber and protein and low in fat. Avoid alcoholic beverages. Avoid smoking. Activities  You are going home a well person, so be as active as possible. Walk with your walker or crutches weight bearing as tolerated-wean as tolerated. Avoid low chairs and slippery surfaces. Avoid twisting your knee. Do not sit longer than 45 minutes at a time. During the daytime, get up every hour and take a brief walk. Exercises  Perform your exercise routine 3 times a day as instructed by the physical therapist.  Gradually increase the repetitions of the exercises. You may place an ice bag on your knee for 15-20 minutes after exercising. You should walk daily, each time lengthening your walking distance as your strength improves. Be sure to work on getting your knee out all the way straight. Do not place a pillow under your knee when resting. Medications  Take  Aspirin 81mg 1 tablet twice a day for 4 weeks. Take a multivitamin with iron once a day for a month. Take a stool softener daily (such as Senokot-S or Colace) to prevent constipation while you are taking iron and pain medication. If constipation occurs, take a laxative (such as Dulcolax tablets, Milk of Magnesia, or suppository). Take pain medication with food. You will find that you will decrease the amount you use as your pain lessens. You have been given Oxycodone 5mg tablets, Take 1-2 tablets every 4-6 hours as needed for pain.   It is ok to take Tylenol and Ibuprofen in addition to your pain meds  Please try to transition off of your Narcotic pain meds as soon as you are comfortable. Incision Care  You will have a waterproof dressing on the knee called Aquacel and it is OK to shower with the dressing in place. The Aquacel dressing will be removed 1 week after surgery. Ok to leave open to the air as long as it is not draining      If you have staples ; they will be removed 2 weeks after surgery by  your home health nurse  If there are steri-strips ; please leave them in place until they fall off. Cover your wound if you are having any drainage. Wear your elastic stockings for 4 weeks. You may remove them for approximately 1 hour when showering       Follow-up Office Visit  Post op appointment is with Callie Jiménez PA-C ( Dr. Angelito MADRIGAL) on on 3/14/2022 at 11:20 am. Please call the office if you need to change your follow -up appointment ([phone])    Reasons to call the Doctor  1. Increased redness, swelling or drainage from you incision site. 2.  Temperature consistently greater than 101 degrees. 3.  Increased pain or unrelieved pain. 4.  Calf or chest pain      Home Health/Home therapy  Physical Therapy-routine exercises, ROM,  gait training quad strengthening. 3 times in 10 day period   Remove Aquacel Dressing 1 week post op. (leave steri-strips in place iuntil they come off. Out Patient Physical therapy  You should expect to begin outpatient physical therapy approximately 2 -2 1/2 weeks after surgery. This should already be set up prior to your surgery. If it is not already scheduled, please call the office and speak with one of my assistants about setting up outpatient physical therapy. Other instructions  Do not take more than 4 Grams of Tylenol in 24 hours. Many pain medications contain Tylenol. Percocet contains 325mg of Tylenol per tablet; Lortab contains 500mg of Tylenol per tablet, Norco contains 325 mg of Tylenol per tablet.     Tylenol usage:  325 mg tablets DO NOT take more than 12 tablets in 24 hours                            500mg tablets DO NOT take more than 8 tablets in 24 hours

## 2022-02-25 NOTE — OP NOTES
Total Knee Operative Report      Patient: Fadi Montoya MRN: [id number]  SSN: [id number]    YOB: 1945  Age: 68 y.o. Sex: female      DATE OF SURGERY:  2/25/2022    Indications: This is a 68 yrs female who presents with  left knee DJD. The patient was admitted for surgery as conservative measures have failed. Date of Surgery: 2/25/2022     Preoperative Diagnosis:  LEFT KNEE PRIMARY OA    Postoperative Diagnosis: LEFT KNEE PRIMARY OA    Surgeon: Cari Munguia MD (Jody)    Anesthesia: Spinal    Procedure: Procedure(s):  LEFT TOTAL KNEE REPLACEMENT      Procedure Details:  After the procedure had been explained to the patient including the risks, benefits and possible complications, Fadi Montoya signed the informed consent. Fadi Montoya was then brought to the operating room and positioned on the operating table in a supine position and was anethestized with anesthesia. A bravo catheter was placed preoperatively and IV Ancef 2gm  was administered. A pneumatic tourniquet was placed about the limb and the left leg was prepped and draped in the usual sterile manner. The tourniquet was inflated. An anterior longitudinal incision was accomplished just medial to the tibial tubercle and extending approximal 6 centimeters proximal to the superior pole of the patella. A medial parapatellar capsular incision was performed. The medial capsular flap was elevated around to the insertion of the semimembranous tendon. The patella was everted and the knee flexed and externally rotated. The medial and lateral menisci were excised. The lateral half of the fat pad excised and the patella femoral ligament was released. The anterior cruciate ligament was resected and the posterior cruciate ligament was substituted. The Knee was flexed to 90 degrees and an intramedullary canal entry hole was drilled just anterior to the PCL insertion on the femur.  The intramedullary clover was inserted and the cutting guide used this to reference for a resection of 10 mm off of the distal femur in approx 6 degrees of valgus. Using extramedullary instrumentation, the tibial cut was then accomplished with three degrees of posterior slope. Approxiamately 3 mm of bone was removed from the medial side of the tibia. The flexion and extension gaps were assessed. The sizing guide for the femoral component was then placed and a size 6 was chosen. The guide was set in  3 degrees external rotation to the epicondylar axis to create an equal flexion gap. The appropriate cutting blocks were then utilized to perform the anterior,  Posterior, and  Chamfer cuts  with appropriate lateral translation accomplished. The tibia was sized to a C component. The tibial base plate was pinned into place and stem site prepared. The femoral trial with the box guide was set for the posterior cruciate substituting prosthesis and these cuts were made also. Lug holes were drilled to accommodate the femoral component. A preliminary range of motion was accomplished with the above size trial components. A 11 millimeter polyethylene insert allowed the patient to obtain full extension as well as appropriate flexion. The patient's ligaments were stable in flexion and extension to medial and lateral stressing and the alignment was through the appropriate mechanical axis. The patella was then measured using the calipers and found to be 22mm thick. Using the cutting guide, 8.5mm were then resected to accommodate the size 32 patella three peg button. The appropriate guide was then used to drill the three pegs. All trial components were removed and the cut surfaces prepared for cementing with irrigation and debridement of the bone interstices. There were no femoral deficiencies. There were no tibial deficiencies. No augmentation was utilized. Two package of cement were mixed and the permanent components cemented into place.   The femoral and tibial components were pressurized in full extension as well as 70 degrees of flexion. The patella component was pressurized using the patella clamp. Excess cement was using a curette. A combination of Exparel, MSO4, Marcaine, Steroid and Saline was then injected into the entirety of the capsule to assist with post-op pain relief. Once the cement was hardened, the patella clamp was removed and the knee was copiously irrigated. Retrialing was done and a size 6L MC CD tibial polyethylene component  of 11mm thickness was chosen. Kavita June knee was placed through range of motion and noted to be stable as mentioned above with the trail components. The wound was dry, therefore no drain was used. The capsular layer was closed using a #1 ethibond suture and stratafix suture, while subcutaneous layers were closed using 2-0 Dexon interrupted sutures. Finally the skin was closed using Skin staples, which were applied in occlusive fashion and sterile bandage applied. An Iceman cryo pad was applied on the operative leg. The pneumatic tourniquet was deflated. Sponge count and needle counts were correct. Kavita June left the operating room and went to the PACU in Stable Condition. Julio Loza PA-C was of vital assistance during the performance of the procedure. She was essential for positioning the knee for the various parts of the procedure and assisting with the exposure of the knee, protection of vital structures and the closure of the knee. Tourniquet Time: * Missing tourniquet times found for documented tourniquets in lo *     Implants:   Implant Name Type Inv.  Item Serial No.  Lot No. LRB No. Used Action   CEMENT BNE 40GM FULL DOSE PMMA W/O GENT M VISC N RADPQ LNG - SNA  CEMENT BNE 40GM FULL DOSE PMMA W/O GENT M VISC N RADPQ LNG NA [employer] [id number] Left 2 Implanted   TIB PRN NP STM 5 DEG SZ CL -- PERSONA - SNA  TIB PRN NP STM 5 DEG SZ CL -- PERSONA NA [employer] [id number] Left 1 Implanted   PAT PSN VE POLY 32MM -- PERSONA - SNA  PAT PSN VE POLY 32MM -- PERSONA NA [employer] [id number] Left 1 Implanted   COMPONENT FEM SZ 6 NICOLE L KNEE CO CHROM OTILIO CRUCE RET COR - SNA  COMPONENT FEM SZ 6 NICOLE L KNEE CO CHROM OTILIO CRUCE RET COR NA [employer] [id number] Left 1 Implanted   PSN MC VE ASF L 11MM 6-7/CD - SNA  PSN MC VE ASF L 11MM 6-7/CD NA [employer] [id number] Left 1 Implanted        Femur Size: 6    Tibia Size: C    Patella: 32    Condition: Stable    Findings: DJD    Estimated Blood Loss:    100 cc         Drains: None    Specimens: * No specimens in log *      Complications:  None; patient tolerated the procedure well    Signed By: Cari Rothman MD (2/25/2022 at 12:37 PM)

## 2022-02-25 NOTE — PROGRESS NOTES
TRANSFER - IN REPORT:    Verbal report received from Ghislaine(name) on Sharron Larkin  being received from PACU(unit) for routine post - op      Report consisted of patients Situation, Background, Assessment and   Recommendations(SBAR). Information from the following report(s) SBAR, Kardex and OR Summary was reviewed with the receiving nurse. Opportunity for questions and clarification was provided. Assessment completed upon patients arrival to unit and care assumed.

## 2022-02-25 NOTE — PROGRESS NOTES
TRANSFER - OUT REPORT:    Verbal report given to JON Linares(name) on Ryan Marin  being transferred to (unit) for routine post - op       Report consisted of patients Situation, Background, Assessment and   Recommendations(SBAR). Time Pre op antibiotic given:1202  Anesthesia Stop time: 1440  Stanford Present on Transfer to floor:no  Order for Stanford on Chart:no  Discharge Prescriptions with Chart:n/a    Information from the following report(s) SBAR, Procedure Summary, Intake/Output, MAR and Recent Results was reviewed with the receiving nurse. Opportunity for questions and clarification was provided. Is the patient on 02? NO       L/Min        Other     Is the patient on a monitor? NO    Is the nurse transporting with the patient? NO    Surgical Waiting Area notified of patient's transfer from PACU? YES      The following personal items collected during your admission accompanied patient upon transfer:   Dental Appliance: Dental Appliances: None  Vision:    Hearing Aid:    Jewelry: Jewelry: None  Clothing: Clothing: Other (comment) (clothes and shoes returned to pacu)  Other Valuables:  Other Valuables: None  Valuables sent to safe:

## 2022-02-25 NOTE — ANESTHESIA POSTPROCEDURE EVALUATION
Procedure(s):  LEFT TOTAL KNEE REPLACEMENT. MAC, spinal    Anesthesia Post Evaluation        Patient location during evaluation: PACU  Patient participation: complete - patient participated  Level of consciousness: awake and alert  Pain management: adequate  Airway patency: patent  Anesthetic complications: no  Cardiovascular status: acceptable  Respiratory status: acceptable  Hydration status: acceptable  Comments: I have seen and evaluated the patient and is ready for discharge. Rhona Kelley MD    Post anesthesia nausea and vomiting:  none      INITIAL Post-op Vital signs:   Vitals Value Taken Time   /103 02/25/22 1451   Temp 36.6 °C (97.9 °F) 02/25/22 1439   Pulse 91 02/25/22 1453   Resp 12 02/25/22 1453   SpO2 95 % 02/25/22 1453   Vitals shown include unvalidated device data.

## 2022-02-26 VITALS
RESPIRATION RATE: 18 BRPM | OXYGEN SATURATION: 95 % | TEMPERATURE: 97.6 F | SYSTOLIC BLOOD PRESSURE: 154 MMHG | DIASTOLIC BLOOD PRESSURE: 74 MMHG | HEART RATE: 74 BPM

## 2022-02-26 LAB
ANION GAP SERPL CALC-SCNC: 4 MMOL/L (ref 5–15)
BUN SERPL-MCNC: 18 MG/DL (ref 6–20)
BUN/CREAT SERPL: 21 (ref 12–20)
CALCIUM SERPL-MCNC: 8.1 MG/DL (ref 8.5–10.1)
CHLORIDE SERPL-SCNC: 112 MMOL/L (ref 97–108)
CO2 SERPL-SCNC: 24 MMOL/L (ref 21–32)
CREAT SERPL-MCNC: 0.86 MG/DL (ref 0.55–1.02)
GLUCOSE SERPL-MCNC: 163 MG/DL (ref 65–100)
HGB BLD-MCNC: 11.2 G/DL (ref 11.5–16)
INR PPP: 1 (ref 0.9–1.1)
POTASSIUM SERPL-SCNC: 3.9 MMOL/L (ref 3.5–5.1)
PROTHROMBIN TIME: 10.8 SEC (ref 9–11.1)
SODIUM SERPL-SCNC: 140 MMOL/L (ref 136–145)

## 2022-02-26 PROCEDURE — 85610 PROTHROMBIN TIME: CPT

## 2022-02-26 PROCEDURE — 36415 COLL VENOUS BLD VENIPUNCTURE: CPT

## 2022-02-26 PROCEDURE — 74011250637 HC RX REV CODE- 250/637: Performed by: ORTHOPAEDIC SURGERY

## 2022-02-26 PROCEDURE — 97165 OT EVAL LOW COMPLEX 30 MIN: CPT

## 2022-02-26 PROCEDURE — 77030041034 HC KT HIP PATT -B

## 2022-02-26 PROCEDURE — 80048 BASIC METABOLIC PNL TOTAL CA: CPT

## 2022-02-26 PROCEDURE — 74011250636 HC RX REV CODE- 250/636: Performed by: ORTHOPAEDIC SURGERY

## 2022-02-26 PROCEDURE — 97535 SELF CARE MNGMENT TRAINING: CPT

## 2022-02-26 PROCEDURE — 97116 GAIT TRAINING THERAPY: CPT

## 2022-02-26 PROCEDURE — 97161 PT EVAL LOW COMPLEX 20 MIN: CPT

## 2022-02-26 PROCEDURE — 97530 THERAPEUTIC ACTIVITIES: CPT

## 2022-02-26 PROCEDURE — 74011000250 HC RX REV CODE- 250: Performed by: ORTHOPAEDIC SURGERY

## 2022-02-26 PROCEDURE — 85018 HEMOGLOBIN: CPT

## 2022-02-26 PROCEDURE — G0378 HOSPITAL OBSERVATION PER HR: HCPCS

## 2022-02-26 RX ORDER — OXYCODONE HYDROCHLORIDE 5 MG/1
5 TABLET ORAL
Qty: 41 TABLET | Refills: 0 | Status: SHIPPED | OUTPATIENT
Start: 2022-02-26 | End: 2022-03-01

## 2022-02-26 RX ADMIN — POLYETHYLENE GLYCOL 3350 17 G: 17 POWDER, FOR SOLUTION ORAL at 08:23

## 2022-02-26 RX ADMIN — ERYTHROMYCIN: 5 OINTMENT OPHTHALMIC at 05:08

## 2022-02-26 RX ADMIN — ROSUVASTATIN 5 MG: 10 TABLET, FILM COATED ORAL at 08:23

## 2022-02-26 RX ADMIN — SENNOSIDES AND DOCUSATE SODIUM 1 TABLET: 50; 8.6 TABLET ORAL at 08:22

## 2022-02-26 RX ADMIN — CELECOXIB 200 MG: 200 CAPSULE ORAL at 08:23

## 2022-02-26 RX ADMIN — MINERAL OIL AND WHITE PETROLATUM: 150; 830 OINTMENT OPHTHALMIC at 08:26

## 2022-02-26 RX ADMIN — LOSARTAN POTASSIUM: 50 TABLET, FILM COATED ORAL at 08:22

## 2022-02-26 RX ADMIN — ACETAMINOPHEN 1000 MG: 500 TABLET ORAL at 06:23

## 2022-02-26 RX ADMIN — ACETAMINOPHEN 1000 MG: 500 TABLET ORAL at 01:03

## 2022-02-26 RX ADMIN — FAMOTIDINE 20 MG: 20 TABLET, FILM COATED ORAL at 08:22

## 2022-02-26 RX ADMIN — ASPIRIN 81 MG: 81 TABLET, COATED ORAL at 08:22

## 2022-02-26 RX ADMIN — OXYCODONE 5 MG: 5 TABLET ORAL at 08:22

## 2022-02-26 RX ADMIN — CEFAZOLIN 2 G: 1 INJECTION, POWDER, FOR SOLUTION INTRAMUSCULAR; INTRAVENOUS at 05:08

## 2022-02-26 NOTE — PROGRESS NOTES
PHYSICAL THERAPY EVALUATION/DISCHARGE  Patient: Danelle Cosby (77 y.o. female)  Date: 2/26/2022  Primary Diagnosis: Left knee DJD [M17.12]  Procedure(s) (LRB):  LEFT TOTAL KNEE REPLACEMENT (Left) 1 Day Post-Op   Precautions: fall         ASSESSMENT  Based on the objective data described below, the patient presents with impairment in functional mobility, activity tolerance, ROM, strength and balance s/p L TKR POD #0. PLOF: independent with ADLs and IADLs. Patient lives with skip in one story home, 5 steps to enter, L rails. Patient overall supervision-mod I for bed mobility and for transfers to/from standing with RW. Pt participated in  gait training 280 feet with RW and intially step to, converting to step through and continuous gait pattern. Patient returned to chair at end of session. Pt noted to have low O2 with activity throughout session, 89% lowest point. Pt recovers with deep breathing, has a pulseox. Pt given handout on O2/breathing and instructed in proper recovery patterns      Patient's mobility was on target for POD#1 and patient is clear to DC. Patient instructed NOT to get up from bed, chair or commode without calling for assistance. Initiated post TKA exercise protocol and wrote same on [employer]. Recommend HHPT. Functional Outcome Measure: The patient scored 100/100 on the barthel outcome measure which is indicative of no complexity. Other factors to consider for discharge:      Further skilled acute physical therapy is not indicated at this time.      PLAN :  Recommendation for discharge: (in order for the patient to meet his/her long term goals)  Physical therapy at least 2 days/week in the home     This discharge recommendation:  Has been made in collaboration with the attending provider and/or case management    IF patient discharges home will need the following DME: to be determined (TBD)       SUBJECTIVE:   Patient stated I am .    OBJECTIVE DATA SUMMARY:   HISTORY:    Past Medical History:   Diagnosis Date    Arthritis     Diverticulitis 2009    HAS HAD NO EPISODES SINCE COLOSTOMY REVERSAL    GERD (gastroesophageal reflux disease)     Hypertension     Menopause     Nausea & vomiting     PONV    Stroke ([address] 08/2016    TIA ;possible association to prempro. ; NO RESIDUAL DEFICITS     Past Surgical History:   Procedure Laterality Date    COLONOSCOPY N/A 8/3/2021    [facility]t [facility] ENDOSCOPY    HX CATARACT REMOVAL  2016    right eye cataract removed W/ IOL    HX COLONOSCOPY      HX DILATION AND CURETTAGE      MISCARRIAGE IN HER 20s    HX GI  2009? colostomy for diverticultis and then a reversal    HX GI      COLONOSCOPY    HX KNEE ARTHROSCOPY Left 12/2018    MENISCUS    HX OTHER SURGICAL Left     cyst removed from wrist    HX OTHER SURGICAL  02/2017    LOWER FACE LIFT    HX TONSILLECTOMY      HX TUBAL LIGATION      MA ABDOMEN SURGERY PROC UNLISTED  07/05/2017    LIPOSUCTION TWO SITES ABDOMEN AND BILATERAL FLANKS, SCAR REVISION ABDOMEN  SCAR REVISION TRUNK        Prior level of function: independent  Personal factors and/or comorbidities impacting plan of care:     Home Situation  Home Environment: Private residence  # Steps to Enter: 5  Rails to Enter: Yes  Hand Rails : Left  One/Two Story Residence: One story  Living Alone: No  Support Systems: Spouse/Significant Other  Patient Expects to be Discharged to[de-identified] Home  Current DME Used/Available at Home: Walker, rolling  Tub or Shower Type: Shower    EXAMINATION/PRESENTATION/DECISION MAKING:   Critical Behavior:  Neurologic State: Alert  Orientation Level: Oriented X4  Cognition: Appropriate decision making  Safety/Judgement: Awareness of environment  Hearing: Auditory  Auditory Impairment: None  Skin:  intact  Edema: none  Range Of Motion:  AROM: Within functional limits                       Strength:    Strength:  Within functional limits Tone & Sensation:   Tone: Normal                              Coordination:  Coordination: Within functional limits  Vision:   Acuity: Within Defined Limits;Able to read clock/calendar on wall without difficulty  Functional Mobility:  Bed Mobility:  Rolling: Modified independent  Supine to Sit: Modified independent  Sit to Supine: Modified independent  Scooting: Modified independent  Transfers:  Sit to Stand: Modified independent  Stand to Sit: Modified independent  Stand Pivot Transfers: Modified independent                    Balance:   Sitting: Intact  Standing: Intact; With support  Ambulation/Gait Training:  Distance (ft): 300 Feet (ft)  Assistive Device: Walker, rolling  Ambulation - Level of Assistance: Supervision     Gait Description (WDL): Exceptions to WDL  Gait Abnormalities: Shuffling gait        Base of Support: Widened     Speed/Sugar: Shuffled; Slow  Step Length: Right shortened;Left shortened                  Stairs:  Number of Stairs Trained: 4  Stairs - Level of Assistance: Modified independent   Rail Use: Left     Functional Measure:  Barthel Index:    Bathin  Bladder: 10  Bowels: 10  Groomin  Dressing: 10  Feeding: 10  Mobility: 15  Stairs: 10  Toilet Use: 10  Transfer (Bed to Chair and Back): 15  Total: 100/100       The Barthel ADL Index: Guidelines  1. The index should be used as a record of what a patient does, not as a record of what a patient could do. 2. The main aim is to establish degree of independence from any help, physical or verbal, however minor and for whatever reason. 3. The need for supervision renders the patient not independent. 4. A patient's performance should be established using the best available evidence. Asking the patient, friends/relatives and nurses are the usual sources, but direct observation and common sense are also important. However direct testing is not needed.   5. Usually the patient's performance over the preceding 24-48 hours is important, but occasionally longer periods will be relevant. 6. Middle categories imply that the patient supplies over 50 per cent of the effort. 7. Use of aids to be independent is allowed. Score Interpretation (from [address])    Independent   60-79 Minimally independent   40-59 Partially dependent   20-39 Very dependent   <20 Totally dependent     -Ketan Garrido, Barthel, D.W. (1965). Functional evaluation: the Barthel Index. [address] ([address] (1997). The Barthel activities of daily living index: self-reporting versus actual performance in the old (> or = 75 years). Journal of [address] [place] [address] 45(7), [facility], ARNUL[place], Myron Shah, Lauryn Murillo. (1999). Measuring the change in disability after inpatient rehabilitation; comparison of the responsiveness of the Barthel Index and Functional [place] Measure. Journal of Neurology, Neurosurgery, and Psychiatry, 66(4), 539-645. Srinivas Patel, [place].DIMITRY, AMA Gomez, & Ti Erwin M.A. (2004) Assessment of post-stroke quality of life in cost-effectiveness studies: The usefulness of the Barthel Index and the EuroQoL-5D.  Quality of Life Research, 15, 706-30            Physical Therapy Evaluation Charge Determination   History Examination Presentation Decision-Making   HIGH Complexity :3+ comorbidities / personal factors will impact the outcome/ POC  HIGH Complexity : 4+ Standardized tests and measures addressing body structure, function, activity limitation and / or participation in recreation  LOW Complexity : Stable, uncomplicated  Other outcome measures barthel  LOW       Based on the above components, the patient evaluation is determined to be of the following complexity level: LOW     Pain Rating:  controlled    Activity Tolerance:   Fair and requires rest breaks      After treatment patient left in no apparent distress:   Sitting in chair and Call bell within reach    COMMUNICATION/EDUCATION:   The patients plan of care was discussed with: Registered nurse and Case management. Fall prevention education was provided and the patient/caregiver indicated understanding. and Patient/family have participated as able in goal setting and plan of care.     Thank you for this referral.  Eleanor Baum, PT   Time Calculation: 56 mins

## 2022-02-26 NOTE — PROGRESS NOTES
Bedside shift change report given to Ross Turner (oncoming nurse) by Zac Sosa (offgoing nurse). Report included the following information SBAR, Kardex, OR Summary, Procedure Summary, Intake/Output and MAR and mobility transfer out of bed.

## 2022-02-26 NOTE — PROGRESS NOTES
Transition of Care Plan   RUR- Observation    DISPOSITION: The disposition plan is home with family assistance and HH  o HH: At [address] ; AVS updated    F/U with PCP/Specialist     Transport: Spouse        Transition of Care Plan:     The Plan for Transition of Care is related to the following treatment goals: [place]    The Patient was provided with a choice of provider and agrees  with the discharge plan. Yes [x] No []    A Freedom of choice list was provided with basic dialogue that supports the patient's individualized plan of care/goals and shares the quality data associated with the providers.        Yes [x] No []    CM: [address] MSW,   [phone]

## 2022-02-26 NOTE — PROGRESS NOTES
I have reviewed discharge instructions with the patient. The patient verbalized understanding. Problem: Airway Clearance - Ineffective  Goal: Achieve or maintain patent airway  Outcome: Resolved/Met     Problem: Gas Exchange - Impaired  Goal: Absence of hypoxia  Outcome: Resolved/Met  Goal: Promote optimal lung function  Outcome: Resolved/Met     Problem: Breathing Pattern - Ineffective  Goal: Ability to achieve and maintain a regular respiratory rate  Outcome: Resolved/Met     Problem: Body Temperature -  Risk of, Imbalanced  Goal: Ability to maintain a body temperature within defined limits  Outcome: Resolved/Met  Goal: Will regain or maintain usual level of consciousness  Outcome: Resolved/Met  Goal: Complications related to the disease process, condition or treatment will be avoided or minimized  Outcome: Resolved/Met     Problem: Isolation Precautions - Risk of Spread of Infection  Goal: Prevent transmission of infectious organism to others  Outcome: Resolved/Met     Problem: Nutrition Deficits  Goal: Optimize nutrtional status  Outcome: Resolved/Met     Problem: Risk for Fluid Volume Deficit  Goal: Maintain normal heart rhythm  Outcome: Resolved/Met  Goal: Maintain absence of muscle cramping  Outcome: Resolved/Met  Goal: Maintain normal serum potassium, sodium, calcium, phosphorus, and pH  Outcome: Resolved/Met     Problem: Loneliness or Risk for Loneliness  Goal: Demonstrate positive use of time alone when socialization is not possible  Outcome: Resolved/Met     Problem: Fatigue  Goal: Verbalize increase energy and improved vitality  Outcome: Resolved/Met     Problem: Patient Education: Go to Patient Education Activity  Goal: Patient/Family Education  Outcome: Resolved/Met     Problem: Falls - Risk of  Goal: *Absence of Falls  Description: Document Norma Fall Risk and appropriate interventions in the flowsheet.   Outcome: Resolved/Met     Problem: Patient Education: Go to Patient Education Activity  Goal: Patient/Family Education  Outcome: Resolved/Met

## 2022-02-26 NOTE — PROGRESS NOTES
Pt post op day 1 of left knee surgery. Pt has ACE wrap to left leg along with compression SCD to right leg and left foot. Pt was able to do mobility and ambulate at 1900 via 2 RN assist out of bed using walker. Ortho stat VS are charted during movement out of bed. Pt tolerated well. Pt took 3 steps forward, two steps to the side, and 3 steps backwards. Pt transferred back into bed with minimal assist. Family member at bedside. Pt instructed to use call light for assistance to bedside commode for bathroom privileges.

## 2022-02-26 NOTE — PROGRESS NOTES
TOTAL KNEE ARTHROPLASTY DAILY NOTE    POD  1 Day Post-Op s/p Procedure(s):  LEFT TOTAL KNEE REPLACEMENT     ASSESSMENT / PLAN :   1. Pain Control : Acceptable, some pain overnight but overall tolerating well. 2. Wound or incisional issue : Ace wrap with ice packs on medial lateral side. Dressing is clean dry and intact. 3. Therapy / Weight Bearing Recommendations : Weight bear as tolerated with use of a walker and two person assist while mobilizing  4. DVT Prophylaxis : Aspirin and mechanical lower extremity compression device  5. Disposition : Patient would like to be discharged today once cleared by physical therapy and hospital discharge criteria. SUBJECTIVE :     ZITA overnight. Pain controlled. Working with PT. Voiding without difficulty. Tolerating a regular diet. Denies CP/SOB/Abd pain/or other complaints. OBJECTIVE :     Vitals:    02/25/22 1903 02/25/22 2042 02/26/22 0506 02/26/22 0812   BP: 127/72 (!) 149/69 120/68 (!) 154/74   Pulse: (!) 102 82 66 74   Resp: 20 20 20 18   Temp:  97.8 °F (36.6 °C) 97.4 °F (36.3 °C) 97.6 °F (36.4 °C)   SpO2:  93% 94% 95%       Alert and oriented x3. NAD  Examination of the left knee reveals that the dressing is clean, dry and intact. Motor Exam :   - fires TA / [place] / [address]  - SILT SP / DP / TN  - warm and well perfused distally      Labs:  Recent Labs     02/26/22  0128   HGB 11.2*   INR 1.0      K 3.9   *   CO2 24   BUN 18   CREA 0.86   *      Patient mobility             [place], [place]  Supervising Physician: Dr. Evita Zuleta.  Sherwin Guan

## 2022-02-26 NOTE — PROGRESS NOTES
OCCUPATIONAL THERAPY EVALUATION/DISCHARGE  Patient: Ilsa Padilla (68 y.o. 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 Pt received sitting in bedside chair, having just finished PT session. Pt completed seated UB/LB dressing, functional mobility, toileting, and standing grooming with modified independence with the use of AE. Hip kit (LB AE) delivered to pt room and left with belongings. As pt is near her functional baseline and plans to d/c home with family to provide assist/supervision with OOB mobility/ADL/IADL tasks, she is cleared for d/c from an OT standpoint. Current Level of Function (ADLs/self-care): modified independent with AE    Functional Outcome Measure: The patient scored 100/100 on the Barthel Index outcome measure which is indicative of independence in basic self care. Other factors to consider for discharge: PLOF, family able to assist as needed      PLAN :  Recommendation for discharge: (in order for the patient to meet his/her long term goals)  No skilled occupational therapy/ follow up rehabilitation needs identified at this time. This discharge recommendation:  Has been made in collaboration with the attending provider and/or case management    IF patient discharges home will need the following DME: hip kit has been delivered for discharge       SUBJECTIVE:   Patient stated I feel like I have a good bit of soreness in this leg.     OBJECTIVE DATA SUMMARY:   HISTORY:   Past Medical History:   Diagnosis Date    Arthritis     Diverticulitis 2009    HAS HAD NO EPISODES SINCE COLOSTOMY REVERSAL    GERD (gastroesophageal reflux disease)     Hypertension     Menopause     Nausea & vomiting     PONV    Stroke ([facility] [address] 08/2016    TIA ;possible association to prempro. ; NO RESIDUAL DEFICITS     Past Surgical History:   Procedure Laterality Date    COLONOSCOPY N/A 8/3/2021    [facility]    HX CATARACT REMOVAL  2016    right eye cataract removed W/ IOL    HX COLONOSCOPY      HX DILATION AND CURETTAGE      MISCARRIAGE IN HER 20s    HX GI  2009? colostomy for diverticultis and then a reversal    HX GI      COLONOSCOPY    HX KNEE ARTHROSCOPY Left 12/2018    MENISCUS    HX OTHER SURGICAL Left     cyst removed from wrist    HX OTHER SURGICAL  02/2017    LOWER FACE LIFT    HX TONSILLECTOMY      HX TUBAL LIGATION      [place] ABDOMEN SURGERY PROC UNLISTED  07/05/2017    LIPOSUCTION TWO SITES ABDOMEN AND BILATERAL FLANKS, SCAR REVISION ABDOMEN  SCAR REVISION TRUNK        Prior Level of Function/Environment/Context:   Expanded or extensive additional review of patient history:     Home Situation  Home Environment: Private residence  # Steps to Enter: 5  Rails to Enter: Yes  Hand Rails : Left  One/Two Story Residence: One story  Living Alone: No  Support Systems: Spouse/Significant Other  Patient Expects to be Discharged to[de-identified] Home  Current DME Used/Available at Home: Walker, rolling  Tub or Shower Type: Shower        EXAMINATION OF PERFORMANCE DEFICITS:  Cognitive/Behavioral Status:  Neurologic State: Alert  Orientation Level: Oriented X4  Cognition: Appropriate decision making  Perception: Appears intact  Perseveration: No perseveration noted  Safety/Judgement: Awareness of environment    Skin: visually intact     Edema: none noted     Hearing: Auditory  Auditory Impairment: None    Vision/Perceptual:                           Acuity: Within Defined Limits;Able to read clock/calendar on wall without difficulty         Range of Motion:    AROM: Within functional limits                         Strength:    Strength:  Within functional limits Coordination:  Coordination: Within functional limits  Fine Motor Skills-Upper: Left Intact; Right Intact    Gross Motor Skills-Upper: Left Intact; Right Intact    Tone & Sensation:    Tone: Normal   sensation: intact                       Balance:  Sitting: Intact  Standing: Intact; With support    Functional Mobility and Transfers for ADLs:  Bed Mobility:  Rolling: Modified independent  Supine to Sit: Modified independent  Sit to Supine: Modified independent  Scooting: Modified independent    Transfers:  Sit to Stand: Modified independent  Stand to Sit: Modified independent  Bathroom Mobility: Modified independent  Toilet Transfer : Modified independent    ADL Assessment:  Feeding: Independent    Oral Facial Hygiene/Grooming: Modified Independent    Bathing: Modified independent    Upper Body Dressing: Modified independent    Lower Body Dressing: Modified independent    Toileting: Modified independent                ADL Intervention and task modifications:       Grooming  Position Performed: Standing  Washing Face: Modified independent  Washing Hands: Modified independent              Upper Body Dressing Assistance  Bra: Modified independent  Pullover Shirt: Modified independent    Lower Body Dressing Assistance  Underpants: Modified independent  Pants With Elastic Waist: Modified independent  Socks: Modified independent  Slip on Shoes with Back: Modified independent  Position Performed: Seated in chair  Cues: Verbal cues provided  Adaptive Equipment Used: Long handled shoe horn;Reacher;Sock aid    Toileting  Toileting Assistance: Modified independent    Cognitive Retraining  Safety/Judgement: Awareness of environment    Bathing: Patient instructed and indicated understanding when bathing to not submerge wound in water, stand to shower or sponge bathe, cover wound with plastic and tape to ensure no water reaches bandage/wound without cues.     Dressing joint: Patient instructed and demonstrated understanding to don/doff Left LE first/last with verbal cues. Patient instructed and demonstrated to don all clothing while sitting prior to standing, doff all clothing to knees while standing, then sit to doff clothing off from knees to feet in order to facilitate fall prevention, pain management, and energy conservation with Modified independent. Dressing joint reach exercise: To increase independence with lower body dressing, patient instructed and demonstrated to reach down Left LE in a seated position slowly to prevent tearing/shearing until slight pull is felt, hold at end range for 10 seconds, then return to starting upright position with Modified independent. Patient instructed to complete three sets of three repetitions each daily. Home safety: Patient instructed and indicated understanding on home modifications and safety (raise height of ADL objects, appropriate height of chair surfaces, recliner safety, change of floor surfaces, clear pathways) to increase independence and fall prevention. Standing: Patient instructed and demonstrated during ADLs to walk up to sink/counter top/surfaces, step into walker to increase safety of joint and fall prevention with Modified independent. Patient educated about knee anatomy and educated to avoid rotation of Left LE. Instructed to apply concept to ADLs within the home (no twisting of knee during reaching across body, square off while using objects, slide objects along surfaces). Patient instructed and indicated understanding to increase amount of time standing, observe standing position during ADLs in order to increase even weight bearing through bilateral LEs in order to increase independence with ADLs. Goal to be reached 30 days post - op, per orthopedic surgeon or per PT.     Tub/shower transfer: Patient instructed and indicated understanding regarding when it is safe to begin transfer into tub/shower (complete stairs with PT, advance exercises with PT high enough to clear tub/shower height). Patient instructed to use the same technique as used with stairs when entering and exiting tub/shower (\"up with the non-surgical, down with the surgical leg\"). Functional Measure:    Barthel Index:  Bathin  Bladder: 10  Bowels: 10  Groomin  Dressing: 10  Feeding: 10  Mobility: 15  Stairs: 10  Toilet Use: 10  Transfer (Bed to Chair and Back): 15  Total: 100/100      The Barthel ADL Index: Guidelines  1. The index should be used as a record of what a patient does, not as a record of what a patient could do. 2. The main aim is to establish degree of independence from any help, physical or verbal, however minor and for whatever reason. 3. The need for supervision renders the patient not independent. 4. A patient's performance should be established using the best available evidence. Asking the patient, friends/relatives and nurses are the usual sources, but direct observation and common sense are also important. However direct testing is not needed. 5. Usually the patient's performance over the preceding 24-48 hours is important, but occasionally longer periods will be relevant. 6. Middle categories imply that the patient supplies over 50 per cent of the effort. 7. Use of aids to be independent is allowed. Score Interpretation (from [address] [facility])    Independent   60-79 Minimally independent   40-59 Partially dependent   20-39 Very dependent   <20 Totally dependent     -Najma Garrido., Barthel, D.W. (1965). Functional evaluation: the Barthel Index. [facility] ([address] [facility] [address] (). The Barthel activities of daily living index: self-reporting versus actual performance in the old (> or = 75 years). Journal of [address] [place] [address] 45(7), [facility], JJOSEFINA, Maritza Jessica., Shirin Paredes. (1999).  Measuring the change in disability after inpatient rehabilitation; comparison of the responsiveness of the Barthel Index and Functional [place] Measure. Journal of Neurology, Neurosurgery, and Psychiatry, 66(4), 879-366. EDGAR Lopez.A, AMA Gomez, & Mekhi Spencer M.A. (2004) Assessment of post-stroke quality of life in cost-effectiveness studies: The usefulness of the Barthel Index and the EuroQoL-5D. Quality of Life Research, 15, 746-82         Occupational Therapy Evaluation Charge Determination   History Examination Decision-Making   LOW Complexity : Brief history review  LOW Complexity : 1-3 performance deficits relating to physical, cognitive , or psychosocial skils that result in activity limitations and / or participation restrictions  MEDIUM Complexity : Patient may present with comorbidities that affect occupational performnce. Miniml to moderate modification of tasks or assistance (eg, physical or verbal ) with assesment(s) is necessary to enable patient to complete evaluation       Based on the above components, the patient evaluation is determined to be of the following complexity level: LOW   Pain Ratin-3/10 LLE    Activity Tolerance:   Good    After treatment patient left in no apparent distress:    Sitting in chair, Heels elevated for pressure relief and Call bell within reach    COMMUNICATION/EDUCATION:   The patients plan of care was discussed with: Registered nurse.      Thank you for this referral.  Mendel Landsberg, OT  Time Calculation: 29 mins

## 2022-02-26 NOTE — PROGRESS NOTES
Problem: Falls - Risk of  Goal: *Absence of Falls  Description: Document Zoltan Blight Fall Risk and appropriate interventions in the flowsheet.   Outcome: Progressing Towards Goal  Note: Fall Risk Interventions:  Mobility Interventions: Communicate number of staff needed for ambulation/transfer,Patient to call before getting OOB,PT Consult for assist device competence,PT Consult for mobility concerns,Utilize walker, cane, or other assistive device,Utilize gait belt for transfers/ambulation         Medication Interventions: Teach patient to arise slowly,Patient to call before getting OOB,Utilize gait belt for transfers/ambulation    Elimination Interventions: Call light in reach

## 2022-02-28 ENCOUNTER — PATIENT OUTREACH (OUTPATIENT)
Dept: CASE MANAGEMENT | Age: 77
End: 2022-02-28

## 2022-02-28 NOTE — NURSE NAVIGATOR
[address]  [facility]     FROM:                                TO: At [address]                                                      ([address] [place] [address] or Facility name)  Jen Shah [address]  [address] [place] [address]  Dept: [address]: [phone]                                      Room#:  552/01                                                       Nurse Navigator:  Josefa Camacho RN         SITUATION      ASAScore: ASA 2 - Patient with mild systemic disease with no functional limitations    Admitted:  2/25/2022  Hospital Day: 2      Attending Provider:  No att. providers found     Consultations:  None    PCP:  Martha Holly   [phone]     Admitting Dx: Left knee DJD [M17.12]       Active Problems:    Left knee DJD (2/25/2022)      3 Days Post-Op of   Procedure(s):  LEFT TOTAL KNEE REPLACEMENT   BY: Cari Spicer MD (Jody)             ON: 2/25/2022                  Code Status: Prior             Advance Directive? Not Received (Send w/patient)     Isolation:  There are currently no Active Isolations       MDRO: No current active infections    BACKGROUND     Allergies:   Allergies   Allergen Reactions    Shellfish Derived Hives and Angioedema     Topical iodine ok on skin    Pcn [Penicillins] Rash     PATIENT DOES NOT REALLY REMEMBER REACTION    Prempro [Conj Estrog-Medroxyprogest Ace] Other (comments)     Possible TIA    Morphine Hives       Past Medical History:   Diagnosis Date    Arthritis     Diverticulitis 2009    HAS HAD NO EPISODES SINCE COLOSTOMY REVERSAL    GERD (gastroesophageal reflux disease)     Hypertension     Menopause     Nausea & vomiting     PONV    Stroke ([facility] [address] 08/2016    TIA ;possible association to prempro. ; NO RESIDUAL DEFICITS       Past Surgical History:   Procedure Laterality Date    COLONOSCOPY N/A 8/3/2021    COLON[facility] performed [facility] Raul Gray MD at [facility] ENDOSCOPY    HX CATARACT REMOVAL  2016    right eye cataract removed W/ IOL    HX COLONOSCOPY      HX DILATION AND CURETTAGE      MISCARRIAGE IN HER 20s    HX GI  2009? colostomy for diverticultis and then a reversal    HX GI      COLONOSCOPY    HX KNEE ARTHROSCOPY Left 12/2018    MENISCUS    HX OTHER SURGICAL Left     cyst removed from wrist    HX OTHER SURGICAL  02/2017    LOWER FACE LIFT    HX TONSILLECTOMY      HX TUBAL LIGATION      [place] ABDOMEN SURGERY PROC UNLISTED  07/05/2017    LIPOSUCTION TWO SITES ABDOMEN AND BILATERAL FLANKS, SCAR REVISION ABDOMEN  SCAR REVISION TRUNK        Prior to Admission Medications   Prescriptions Last Dose Informant Patient Reported? Taking? Biotin 2,500 mcg cap 1/25/2022 at Unknown time  Yes No   Sig: Take  by mouth daily. cholecalciferol (Vitamin D3) (1000 Units /25 mcg) tablet 1/25/2022 at Unknown time  Yes Yes   Sig: Take 1,000 Units by mouth daily. mirabegron ER (Myrbetriq) 25 mg ER tablet 2/24/2022 at Unknown time  Yes Yes   Sig: Take 25 mg by mouth daily. pantoprazole (Protonix) 40 mg tablet 2/24/2022 at Unknown time  Yes Yes   Sig: Take 40 mg by mouth daily. rosuvastatin (CRESTOR) 5 mg tablet 2/24/2022 at Unknown time  Yes Yes   Sig: Take 5 mg by mouth daily. valsartan-hydroCHLOROthiazide (DIOVAN-HCT) 160-12.5 mg per tablet 2/24/2022 at Unknown time  Yes Yes   Sig: Take 1 Tablet by mouth daily. Facility-Administered Medications: None       Vaccinations:    Immunization History   Administered Date(s) Administered    COVID-19, Charlene Most, Primary or Immunocompromised Series, MRNA, PF, 100mcg/0.5mL 09/06/2021, 10/18/2021    Tdap 07/15/2019         ASSESSMENT   Age: 68 y.o. Gender: female        Height:                      Weight:      No data found. Active Orders   There are no active orders of the following types: Diet.        Orientation: Orientation Level: Oriented X4    Active Lines/Drains:  (Peg Tube / [place] / CL or S/L?):no    Urinary Status: Voiding      Last BM:       Skin Integrity: Incision (comment)             Mobility: Slightly limited   Weight Bearing Status: WBAT (Weight Bearing as Tolerated)      Gait Training  Assistive Device: Walker, rolling  Ambulation - Level of Assistance: Supervision  Distance (ft): 300 Feet (ft)  Stairs - Level of Assistance: Modified independent  Number of Stairs Trained: 4  Rail Use: Left      On Anticoagulation? YES  Aspirin                                         Pain Medications given:  oxycodone                                   Lab Results   Component Value Date/Time    Glucose 163 (H) 02/26/2022 01:28 AM    Hemoglobin A1c 5.4 02/01/2022 11:46 AM    INR 1.0 02/26/2022 01:28 AM    INR 1.0 02/01/2022 11:46 AM    HGB 11.2 (L) 02/26/2022 01:28 AM    HGB 14.5 02/01/2022 11:46 AM    HGB 12.9 07/28/2020 11:40 AM    HGB 13.6 06/09/2020 11:00 AM       Readmission Risks:  Score:         RECOMMENDATION     See After Visit Summary (AVS) for:  · Discharge instructions  · After [address]   · Medication Reconciliation          [facility] Orthopaedic Nurse Navigator  ARELI BurrellN, RN-BC       Office  [phone]  Cell      [phone]  Fax      [phone]  [email or web address]             . Harriet

## 2022-02-28 NOTE — PROGRESS NOTES
Post Discharge Follow-up contact after Joint Replacement    Patient discharged on 2/26/22  By  Cari Regan(Marina) JEANCARLOS   following  left knee Arthroplasty. Spoke with patient today, who reports that    \" I realized that they didn't include aspirin when we picked up the prescriptions at the pharmacy. So my  is going to the pharmacy right now to get me some. \"  Denies Fever, Shortness of Breath or Chest Pain. Home Health has not visited; however, the first visit is scheduled for tomorrow. Patient also reports:  Surgical dressing clean, dry, intact  Calf is non-tender, operative extremity has moderate swelling. Pain is well managed. Discussed use of ice & elevation. Patient is  exercising independently. Pt states that she will begin taking Aspirin for anticoagulation as soon as  returns from pharmacy, and that she will continue twice daily. She is taking oxycodone and Tylenol for pain. Patient is not experiencing symptoms of constipation & urinating without difficulty. Discussed side effects of anticoagulants & pain medications (bleeding/bruising, constipation, lightheaded/dizziness)  Follow up appointment is scheduled. Discussed calling surgeon Dr. Irma Mayes  for drainage, bleeding, swelling in operative extremity, fever or pain. Discussed calling PCP Dr Noris Gonzales with other medical issues.

## 2022-03-18 PROBLEM — R07.9 CHEST PAIN: Status: ACTIVE | Noted: 2017-06-20

## 2022-03-19 PROBLEM — Z41.1 ENCOUNTER FOR COSMETIC SURGERY: Status: ACTIVE | Noted: 2017-01-27

## 2022-03-19 PROBLEM — E78.5 DYSLIPIDEMIA: Status: ACTIVE | Noted: 2017-06-20

## 2022-03-19 PROBLEM — G45.9 TRANSIENT CEREBRAL ISCHEMIA: Status: ACTIVE | Noted: 2017-06-20

## 2022-03-19 PROBLEM — M17.12 LEFT KNEE DJD: Status: ACTIVE | Noted: 2022-02-25

## 2022-03-20 PROBLEM — N39.46 MIXED INCONTINENCE: Status: ACTIVE | Noted: 2019-05-07

## 2023-05-10 RX ORDER — VALSARTAN AND HYDROCHLOROTHIAZIDE 160; 12.5 MG/1; MG/1
1 TABLET, FILM COATED ORAL DAILY
COMMUNITY

## 2023-05-10 RX ORDER — PANTOPRAZOLE SODIUM 40 MG/1
40 TABLET, DELAYED RELEASE ORAL DAILY
COMMUNITY

## 2023-05-10 RX ORDER — ROSUVASTATIN CALCIUM 5 MG/1
5 TABLET, COATED ORAL DAILY
COMMUNITY

## 2023-05-10 RX ORDER — ASPIRIN 81 MG/1
TABLET, CHEWABLE ORAL 2 TIMES DAILY
COMMUNITY
Start: 2022-02-25

## 2023-09-07 ENCOUNTER — HOSPITAL ENCOUNTER (EMERGENCY)
Facility: HOSPITAL | Age: 78
Discharge: HOME | End: 2023-09-07
Attending: EMERGENCY MEDICINE
Payer: MEDICARE

## 2023-09-07 VITALS
SYSTOLIC BLOOD PRESSURE: 145 MMHG | TEMPERATURE: 98.8 F | OXYGEN SATURATION: 100 % | HEART RATE: 80 BPM | DIASTOLIC BLOOD PRESSURE: 70 MMHG | HEIGHT: 62 IN | BODY MASS INDEX: 26.13 KG/M2 | WEIGHT: 142 LBS | RESPIRATION RATE: 18 BRPM

## 2023-09-07 DIAGNOSIS — K21.00 GASTROESOPHAGEAL REFLUX DISEASE WITH ESOPHAGITIS WITHOUT HEMORRHAGE: Primary | ICD-10-CM

## 2023-09-07 PROCEDURE — 99283 EMERGENCY DEPT VISIT LOW MDM: CPT

## 2023-09-07 RX ORDER — PANTOPRAZOLE SODIUM 40 MG/1
40 TABLET, DELAYED RELEASE ORAL
Qty: 90 TABLET | Refills: 1 | Status: SHIPPED | OUTPATIENT
Start: 2023-09-07

## 2023-09-07 ASSESSMENT — LIFESTYLE VARIABLES
HOW MANY STANDARD DRINKS CONTAINING ALCOHOL DO YOU HAVE ON A TYPICAL DAY: PATIENT DECLINED
HOW OFTEN DO YOU HAVE A DRINK CONTAINING ALCOHOL: PATIENT DECLINED

## 2023-09-07 ASSESSMENT — PAIN - FUNCTIONAL ASSESSMENT: PAIN_FUNCTIONAL_ASSESSMENT: NONE - DENIES PAIN

## 2023-09-07 NOTE — ED NOTES
Delay in Discharge due to patient not being registered. Charge nurse aware.      Tressa Suarez RN  09/07/23 0824

## 2023-09-07 NOTE — DISCHARGE INSTRUCTIONS
Please try taking the Protonix medication daily. Decrease anything acidic in your diet. The most acidic foods are orange juice, coffee, tomato or tomato sauce, anything spicy, anything fried, chocolate. Please follow-up with your GI doctor as scheduled. You can try taking Cepacol lozenges for sore throat.

## 2023-09-08 NOTE — ED PROVIDER NOTES
follow-up with PCP for further evaluation. FINAL IMPRESSION     1. Gastroesophageal reflux disease with esophagitis without hemorrhage          DISPOSITION/PLAN   DISPOSITION Decision To Discharge 09/07/2023 05:54:25 PM      Discharge Note: The patient is stable for discharge home. The signs, symptoms, diagnosis, and discharge instructions have been discussed, understanding conveyed, and agreed upon. The patient is to follow up as recommended or return to ER should their symptoms worsen. PATIENT REFERRED TO:  [address] [place] [address]  [phone]          Your GI Doctor               DISCHARGE MEDICATIONS:     Medication List        START taking these medications      pantoprazole 40 MG tablet  Commonly known as: PROTONIX  Take 1 tablet by mouth every morning (before breakfast)            ASK your doctor about these medications      aspirin 81 MG chewable tablet     mirabegron 25 MG Tb24  Commonly known as: MYRBETRIQ     rosuvastatin 5 MG tablet  Commonly known as: CRESTOR     valsartan-hydroCHLOROthiazide 160-12.5 MG per tablet  Commonly known as: DIOVAN-HCT     vitamin D 25 MCG (1000 UT) Tabs tablet  Commonly known as: CHOLECALCIFEROL               Where to Get Your Medications        These medications were sent to [facility]/pharmacy [address], [facility] [address] [address] [address]      Phone: [phone]   pantoprazole 40 MG tablet           DISCONTINUED MEDICATIONS:  Discharge Medication List as of 9/7/2023  6:13 PM          I am the Primary Clinician of Record. Mary Collazo MD (electronically signed)      (Please note that parts of this dictation were completed with voice recognition software. Quite often unanticipated grammatical, syntax, homophones, and other interpretive errors are inadvertently transcribed by the computer software. Please disregards these errors.  Please

## 2024-03-25 ENCOUNTER — TRANSCRIBE ORDERS (OUTPATIENT)
Facility: HOSPITAL | Age: 79
End: 2024-03-25

## 2024-03-25 DIAGNOSIS — Z12.31 SCREENING MAMMOGRAM FOR BREAST CANCER: Primary | ICD-10-CM

## 2024-03-27 ENCOUNTER — HOSPITAL ENCOUNTER (OUTPATIENT)
Facility: HOSPITAL | Age: 79
Discharge: HOME | End: 2024-03-30
Attending: FAMILY MEDICINE
Payer: MEDICARE

## 2024-03-27 VITALS — WEIGHT: 142 LBS | BODY MASS INDEX: 25.97 KG/M2

## 2024-03-27 DIAGNOSIS — Z12.31 SCREENING MAMMOGRAM FOR BREAST CANCER: ICD-10-CM

## 2024-03-27 PROCEDURE — 77063 BREAST TOMOSYNTHESIS BI: CPT

## 2024-08-21 NOTE — ANESTHESIA POSTPROCEDURE EVALUATION
Post-Anesthesia Evaluation and Assessment Patient: Karen Corral MRN: [id number]  SSN: [id number] YOB: 1945  Age: 68 y.o. Sex: female I have evaluated the patient and they are stable and ready for discharge from the PACU. Cardiovascular Function/Vital Signs Visit Vitals /89 Pulse 65 Temp 36.4 °C (97.5 °F) Resp 12 Ht 5' 2\" (1.575 m) Wt 64.4 kg (142 lb) SpO2 100% Breastfeeding? No  
BMI 25.97 kg/m² Patient is status post MAC anesthesia for Procedure(s): ESOPHAGOGASTRODUODENOSCOPY (EGD) ESOPHAGOGASTRODUODENAL (EGD) BIOPSY 
ESOPHAGEAL DILATION. Nausea/Vomiting: None Postoperative hydration reviewed and adequate. Pain: 
Pain Scale 1: Visual (04/09/19 9612) Pain Intensity 1: 0 (04/09/19 0152) Managed Neurological Status: At baseline Mental Status, Level of Consciousness: Alert and  oriented to person, place, and time Pulmonary Status:  
O2 Device: Room air (04/09/19 2300) Adequate oxygenation and airway patent Complications related to anesthesia: None Post-anesthesia assessment completed. No concerns Signed By: Betty Wesley MD   
 April 9, 2019 Procedure(s): ESOPHAGOGASTRODUODENOSCOPY (EGD) ESOPHAGOGASTRODUODENAL (EGD) BIOPSY 
ESOPHAGEAL DILATION. MAC 
 
<BSHSIANPOST> Vitals Value Taken Time  
BP 0/0 4/9/2019  9:29 AM  
Temp 36.4 °C (97.5 °F) 4/9/2019  8:38 AM  
Pulse 67 4/9/2019  9:29 AM  
Resp 0 4/9/2019  9:29 AM  
SpO2 99 % 4/9/2019  9:29 AM  
Vitals shown include unvalidated device data. Photo Preface (Leave Blank If You Do Not Want): Photographs were obtained today Detail Level: Zone

## 2025-01-29 ENCOUNTER — HOSPITAL ENCOUNTER (EMERGENCY)
Facility: HOSPITAL | Age: 80
Discharge: HOME | End: 2025-01-29
Attending: EMERGENCY MEDICINE
Payer: MEDICARE

## 2025-01-29 ENCOUNTER — APPOINTMENT (OUTPATIENT)
Facility: HOSPITAL | Age: 80
End: 2025-01-29
Payer: MEDICARE

## 2025-01-29 VITALS
HEIGHT: 62 IN | OXYGEN SATURATION: 95 % | WEIGHT: 140 LBS | HEART RATE: 81 BPM | BODY MASS INDEX: 25.76 KG/M2 | SYSTOLIC BLOOD PRESSURE: 153 MMHG | RESPIRATION RATE: 16 BRPM | DIASTOLIC BLOOD PRESSURE: 71 MMHG | TEMPERATURE: 98.1 F

## 2025-01-29 DIAGNOSIS — R10.31 ABDOMINAL PAIN, RIGHT LOWER QUADRANT: Primary | ICD-10-CM

## 2025-01-29 LAB
ALBUMIN SERPL-MCNC: 3.9 G/DL (ref 3.5–5)
ALBUMIN/GLOB SERPL: 1.1 (ref 1.1–2.2)
ALP SERPL-CCNC: 104 U/L (ref 45–117)
ALT SERPL-CCNC: 21 U/L (ref 12–78)
ANION GAP SERPL CALC-SCNC: 9 MMOL/L (ref 2–12)
APPEARANCE UR: CLEAR
AST SERPL-CCNC: 19 U/L (ref 15–37)
BACTERIA URNS QL MICRO: NEGATIVE /HPF
BASOPHILS # BLD: 0.07 K/UL (ref 0–0.1)
BASOPHILS NFR BLD: 1 % (ref 0–1)
BILIRUB SERPL-MCNC: 0.4 MG/DL (ref 0.2–1)
BILIRUB UR QL: NEGATIVE
BUN SERPL-MCNC: 21 MG/DL (ref 6–20)
BUN/CREAT SERPL: 21 (ref 12–20)
CALCIUM SERPL-MCNC: 9.2 MG/DL (ref 8.5–10.1)
CHLORIDE SERPL-SCNC: 107 MMOL/L (ref 97–108)
CO2 SERPL-SCNC: 26 MMOL/L (ref 21–32)
COLOR UR: NORMAL
CREAT SERPL-MCNC: 1.02 MG/DL (ref 0.55–1.02)
DIFFERENTIAL METHOD BLD: ABNORMAL
EOSINOPHIL # BLD: 0.15 K/UL (ref 0–0.4)
EOSINOPHIL NFR BLD: 2.2 % (ref 0–0.7)
EPITH CASTS URNS QL MICRO: NORMAL /LPF
ERYTHROCYTE [DISTWIDTH] IN BLOOD BY AUTOMATED COUNT: 12.7 % (ref 11.5–14.5)
GLOBULIN SER CALC-MCNC: 3.5 G/DL (ref 2–4)
GLUCOSE SERPL-MCNC: 104 MG/DL (ref 65–100)
GLUCOSE UR STRIP.AUTO-MCNC: NEGATIVE MG/DL
HCT VFR BLD AUTO: 43 % (ref 35–47)
HGB BLD-MCNC: 14.2 G/DL (ref 11.5–16)
HGB UR QL STRIP: NEGATIVE
IMM GRANULOCYTES # BLD AUTO: 0.02 K/UL (ref 0–0.04)
IMM GRANULOCYTES NFR BLD AUTO: 0.3 % (ref 0–0.5)
KETONES UR QL STRIP.AUTO: NEGATIVE MG/DL
LEUKOCYTE ESTERASE UR QL STRIP.AUTO: NEGATIVE
LIPASE SERPL-CCNC: 28 U/L (ref 13–75)
LYMPHOCYTES # BLD: 1.6 K/UL (ref 0.8–3.5)
LYMPHOCYTES NFR BLD: 23.5 % (ref 12–49)
MCH RBC QN AUTO: 30.9 PG (ref 26–34)
MCHC RBC AUTO-ENTMCNC: 33 G/DL (ref 30–36.5)
MCV RBC AUTO: 93.5 FL (ref 80–99)
MONOCYTES # BLD: 0.6 K/UL (ref 0–1)
MONOCYTES NFR BLD: 8.8 % (ref 5–13)
NEUTS SEG # BLD: 4.36 K/UL (ref 1.8–8)
NEUTS SEG NFR BLD: 64.2 % (ref 32–75)
NITRITE UR QL STRIP.AUTO: NEGATIVE
NRBC # BLD: 0 K/UL (ref 0–0.01)
NRBC BLD-RTO: 0 PER 100 WBC
PH UR STRIP: 6 (ref 5–8)
PLATELET # BLD AUTO: 189 K/UL (ref 150–400)
PMV BLD AUTO: 10.3 FL (ref 8.9–12.9)
POTASSIUM SERPL-SCNC: 3.6 MMOL/L (ref 3.5–5.1)
PROT SERPL-MCNC: 7.4 G/DL (ref 6.4–8.2)
PROT UR STRIP-MCNC: NEGATIVE MG/DL
RBC # BLD AUTO: 4.6 M/UL (ref 3.8–5.2)
RBC #/AREA URNS HPF: NORMAL /HPF (ref 0–5)
SODIUM SERPL-SCNC: 142 MMOL/L (ref 136–145)
SP GR UR REFRACTOMETRY: 1.02 (ref 1–1.03)
URINE CULTURE IF INDICATED: NORMAL
UROBILINOGEN UR QL STRIP.AUTO: 0.2 EU/DL (ref 0.2–1)
WBC # BLD AUTO: 6.8 K/UL (ref 3.6–11)
WBC URNS QL MICRO: NORMAL /HPF (ref 0–4)

## 2025-01-29 PROCEDURE — 6370000000 HC RX 637 (ALT 250 FOR IP): Performed by: FAMILY MEDICINE

## 2025-01-29 PROCEDURE — 80053 COMPREHEN METABOLIC PANEL: CPT

## 2025-01-29 PROCEDURE — 81001 URINALYSIS AUTO W/SCOPE: CPT

## 2025-01-29 PROCEDURE — 74177 CT ABD & PELVIS W/CONTRAST: CPT

## 2025-01-29 PROCEDURE — 6360000004 HC RX CONTRAST MEDICATION: Performed by: EMERGENCY MEDICINE

## 2025-01-29 PROCEDURE — 96375 TX/PRO/DX INJ NEW DRUG ADDON: CPT

## 2025-01-29 PROCEDURE — 36415 COLL VENOUS BLD VENIPUNCTURE: CPT

## 2025-01-29 PROCEDURE — 83690 ASSAY OF LIPASE: CPT

## 2025-01-29 PROCEDURE — 6360000002 HC RX W HCPCS: Performed by: EMERGENCY MEDICINE

## 2025-01-29 PROCEDURE — 85025 COMPLETE CBC W/AUTO DIFF WBC: CPT

## 2025-01-29 PROCEDURE — 99285 EMERGENCY DEPT VISIT HI MDM: CPT

## 2025-01-29 PROCEDURE — 96374 THER/PROPH/DIAG INJ IV PUSH: CPT

## 2025-01-29 RX ORDER — IOPAMIDOL 755 MG/ML
100 INJECTION, SOLUTION INTRAVASCULAR
Status: COMPLETED | OUTPATIENT
Start: 2025-01-29 | End: 2025-01-29

## 2025-01-29 RX ORDER — OXYCODONE HYDROCHLORIDE 5 MG/1
10 TABLET ORAL
Status: COMPLETED | OUTPATIENT
Start: 2025-01-29 | End: 2025-01-29

## 2025-01-29 RX ORDER — ONDANSETRON 2 MG/ML
4 INJECTION INTRAMUSCULAR; INTRAVENOUS ONCE
Status: COMPLETED | OUTPATIENT
Start: 2025-01-29 | End: 2025-01-29

## 2025-01-29 RX ORDER — OXYCODONE HYDROCHLORIDE 5 MG/1
5 TABLET ORAL EVERY 6 HOURS PRN
Qty: 12 TABLET | Refills: 0 | Status: SHIPPED | OUTPATIENT
Start: 2025-01-29 | End: 2025-02-01

## 2025-01-29 RX ORDER — FENTANYL CITRATE 50 UG/ML
50 INJECTION, SOLUTION INTRAMUSCULAR; INTRAVENOUS ONCE
Status: COMPLETED | OUTPATIENT
Start: 2025-01-29 | End: 2025-01-29

## 2025-01-29 RX ORDER — FENTANYL CITRATE 50 UG/ML
100 INJECTION, SOLUTION INTRAMUSCULAR; INTRAVENOUS ONCE
Status: DISCONTINUED | OUTPATIENT
Start: 2025-01-29 | End: 2025-01-29

## 2025-01-29 RX ORDER — OXYCODONE HYDROCHLORIDE 5 MG/1
10 TABLET ORAL
Status: DISCONTINUED | OUTPATIENT
Start: 2025-01-29 | End: 2025-01-29

## 2025-01-29 RX ADMIN — IOPAMIDOL 100 ML: 755 INJECTION, SOLUTION INTRAVENOUS at 21:41

## 2025-01-29 RX ADMIN — FENTANYL CITRATE 50 MCG: 50 INJECTION INTRAMUSCULAR; INTRAVENOUS at 18:32

## 2025-01-29 RX ADMIN — OXYCODONE 10 MG: 5 TABLET ORAL at 22:42

## 2025-01-29 RX ADMIN — ONDANSETRON 4 MG: 2 INJECTION, SOLUTION INTRAMUSCULAR; INTRAVENOUS at 18:17

## 2025-01-29 ASSESSMENT — PAIN DESCRIPTION - ORIENTATION
ORIENTATION: RIGHT;LOWER
ORIENTATION: RIGHT;LOWER

## 2025-01-29 ASSESSMENT — PAIN - FUNCTIONAL ASSESSMENT
PAIN_FUNCTIONAL_ASSESSMENT: 0-10
PAIN_FUNCTIONAL_ASSESSMENT: ACTIVITIES ARE NOT PREVENTED

## 2025-01-29 ASSESSMENT — LIFESTYLE VARIABLES
HOW MANY STANDARD DRINKS CONTAINING ALCOHOL DO YOU HAVE ON A TYPICAL DAY: PATIENT DOES NOT DRINK
HOW OFTEN DO YOU HAVE A DRINK CONTAINING ALCOHOL: NEVER

## 2025-01-29 ASSESSMENT — PAIN SCALES - GENERAL
PAINLEVEL_OUTOF10: 5
PAINLEVEL_OUTOF10: 8
PAINLEVEL_OUTOF10: 5
PAINLEVEL_OUTOF10: 8

## 2025-01-29 ASSESSMENT — PAIN DESCRIPTION - DESCRIPTORS: DESCRIPTORS: ACHING

## 2025-01-29 ASSESSMENT — PAIN DESCRIPTION - LOCATION
LOCATION: ABDOMEN
LOCATION: ABDOMEN

## 2025-01-29 NOTE — ED TRIAGE NOTES
Pt reports right lower quadrant abd pain since 1130 today. Pt denies any N/V/D, last BM today normal no change to urination.

## 2025-01-29 NOTE — ED PROVIDER NOTES
[facility] EMERGENCY DEPARTMENT  EMERGENCY DEPARTMENT ENCOUNTER       Pt Name: Ruth Chabmers  MRN: [id number]  Birthdate 1945  Date of evaluation: 1/29/2025  Provider: Brandt Enamorado DO   PCP: Jc Forrester MD  Note Started: 6:13 PM EST 1/29/25     CHIEF COMPLAINT       Chief Complaint   Patient presents with    Abdominal Pain        HISTORY OF PRESENT ILLNESS: 1 or more elements      History From: Patient, History limited by: none     Ruth Chambers is a 79 y.o. female presents to the emergency department by private vehicle for evaluation of abdominal pain.       Please See MDM for Additional Details of the HPI/PMH  Nursing Notes were all reviewed and agreed with or any disagreements were addressed in the HPI.     REVIEW OF SYSTEMS        Positives and Pertinent negatives as per HPI.    PAST HISTORY     Past Medical History:  Past Medical History:   Diagnosis Date    Arthritis     Diverticulitis 2009    HAS HAD NO EPISODES SINCE COLOSTOMY REVERSAL    GERD (gastroesophageal reflux disease)     Hypertension     Menopause     Nausea & vomiting     PONV    Stroke (HCC) 08/2016    TIA ;possible association to prempro. ; NO RESIDUAL DEFICITS       Past Surgical History:  Past Surgical History:   Procedure Laterality Date    CATARACT REMOVAL  2016    right eye cataract removed W/ IOL    COLONOSCOPY N/A 8/3/2021    COLONOSCOPY performed by Ayaan Cortes MD at [facility] ENDOSCOPY    COLONOSCOPY      DILATION AND CURETTAGE OF UTERUS      MISCARRIAGE IN HER 20s    GI  2009?    colostomy for diverticultis and then a reversal    GI      COLONOSCOPY    KNEE ARTHROSCOPY Left 12/2018    MENISCUS    OTHER SURGICAL HISTORY Left     cyst removed from wrist    OTHER SURGICAL HISTORY  02/2017    LOWER FACE LIFT    WA UNLISTED PROCEDURE ABDOMEN PERITONEUM & OMENTUM  07/05/2017    LIPOSUCTION TWO SITES ABDOMEN AND BILATERAL FLANKS, SCAR REVISION ABDOMEN  SCAR REVISION TRUNK     TONSILLECTOMY      TUBAL LIGATION

## 2025-01-29 NOTE — ED NOTES
Introduced self to pt, pt  at bedside, explained role and tests to be obtained. Assisted pt with ambulating to restroom, then return to room.

## 2025-01-30 NOTE — DISCHARGE INSTRUCTIONS
--Ibuprofen 400 mg every 6 hours as needed for pain. Take with food.  --Oxycodone 5 mg every 4 to 6 hours as needed for more severe pain. Take with food.  --Miralax 17 grams daily while you are taking the oxycodone. Ok to also take Milk of Magnesia if you need it.  --Return to the ED if you are having worse pain or if the character of the pain changes.

## 2025-01-30 NOTE — ED NOTES
Pt continues to await scan. Pain improved a little at this time, nausea under control. Pt has asked how much longer it will be for scan, educated that scan will occur when radiology is ready to perform and apologies for any delay.

## (undated) DEVICE — DRAPE,CHEST,FENES,15X10,STERIL: Brand: MEDLINE

## (undated) DEVICE — SUTURE VCRL SZ 0 L27IN ABSRB UD L36MM CT-1 1/2 CIR J260H

## (undated) DEVICE — SUTURE PERMA-HAND SZ 4-0 L18IN NONABSORBABLE BLK L13MM P-3 641G

## (undated) DEVICE — NEEDLE HYPO 25GA L1.5IN BVL ORIENTED ECLIPSE

## (undated) DEVICE — SUT PROL 7-0 18IN BV1 DA BLU --

## (undated) DEVICE — KERLIX BANDAGE ROLL: Brand: KERLIX

## (undated) DEVICE — GAUZE SPONGES,12 PLY: Brand: CURITY

## (undated) DEVICE — 4-PORT MANIFOLD: Brand: NEPTUNE 2

## (undated) DEVICE — DRESSING PETRO GZ XRFRM CURAD ST OVERWRAP 5 X 9 IN

## (undated) DEVICE — TOWEL SURG W17XL27IN STD BLU COT NONFENESTRATED PREWASHED

## (undated) DEVICE — SCRUB DRY SURG EZ SCRUB BRUSH PREOPERATIVE GRN

## (undated) DEVICE — SOLUTION SURG PREP 26 CC PURPREP

## (undated) DEVICE — GARMENT,MEDLINE,DVT,INT,CALF,MED, GEN2: Brand: MEDLINE

## (undated) DEVICE — BLADE ELECTRODE: Brand: EDGE

## (undated) DEVICE — DRESSING,GAUZE,XEROFORM,CURAD,1"X8",ST: Brand: CURAD

## (undated) DEVICE — DISPOSABLE TOURNIQUET CUFF SINGLE BLADDER, DUAL PORT AND QUICK CONNECT CONNECTOR: Brand: COLOR CUFF

## (undated) DEVICE — ZIMMER® STERILE DISPOSABLE TOURNIQUET CUFF WITH PLC, DUAL PORT, SINGLE BLADDER, 34 IN. (86 CM)

## (undated) DEVICE — SUTURE VCRL SZ 3-0 L27IN ABSRB UD L24MM PS-1 3/8 CIR PRIM J936H

## (undated) DEVICE — BASIC PACK: Brand: CONVERTORS

## (undated) DEVICE — MASTISOL ADHESIVE LIQ 2/3ML

## (undated) DEVICE — SUTURE MCRYL SZ 3-0 L27IN ABSRB UD L24MM PS-1 3/8 CIR PRIM Y936H

## (undated) DEVICE — DRAPE,REIN 53X77,STERILE: Brand: MEDLINE

## (undated) DEVICE — DRAIN SURG W7MMXL20CM SIL FULL PERF HUBLESS FLAT RADPQ STRP

## (undated) DEVICE — Z DISCONTINUED USE 2275686 GLOVE SURG SZ 8 L12IN FNGR THK13MIL WHT ISOLEX POLYISOPRENE

## (undated) DEVICE — SOLUTION IV 1000ML 0.9% SOD CHL

## (undated) DEVICE — REM POLYHESIVE ADULT PATIENT RETURN ELECTRODE: Brand: VALLEYLAB

## (undated) DEVICE — STERILE POLYISOPRENE POWDER-FREE SURGICAL GLOVES WITH EMOLLIENT COATING: Brand: PROTEXIS

## (undated) DEVICE — SUTURE ABSORBABLE BRAIDED 6-0 P-3 18 IN UD VICRYL J492G

## (undated) DEVICE — TRAY PREP DRY W/ PREM GLV 2 APPL 6 SPNG 2 UNDPD 1 OVERWRAP

## (undated) DEVICE — ARTHROSCOPY RICHMOND-LF: Brand: MEDLINE INDUSTRIES, INC.

## (undated) DEVICE — SURGICAL PROCEDURE PACK BASIN MAJ SET CUST NO CAUT

## (undated) DEVICE — MARKER,SKIN,WI/RULER AND LABELS: Brand: MEDLINE

## (undated) DEVICE — SPONGE GZ W4XL4IN COT RADPQ HIGHLY ABSRB

## (undated) DEVICE — CATH FOL TY IC BAG 16FR 2000ML -- CONVERT TO ITEM 363158

## (undated) DEVICE — SUTURE STRATAFIX SYMMETRIC PDS + SZ 1 L18IN ABSRB VLT L48MM SXPP1A400

## (undated) DEVICE — STERILE POLYISOPRENE POWDER-FREE SURGICAL GLOVES: Brand: PROTEXIS

## (undated) DEVICE — GOWN,PLEAT,SPECIALTY,XL,STRL: Brand: MEDLINE

## (undated) DEVICE — BLADE SAW W083XL354IN THK0047IN CUT THK0047IN SAG FLR

## (undated) DEVICE — PACK,EENT,TURBAN DRAPE,PK II: Brand: MEDLINE

## (undated) DEVICE — BANDAGE COMPR M W6INXL10YD WHT BGE VELC E MTRX HK AND LOOP

## (undated) DEVICE — SUT PROL 5-0 18IN P3 BLU --

## (undated) DEVICE — SYR 20ML LL STRL LF --

## (undated) DEVICE — SYR 10ML CTRL LR LCK NSAF LF --

## (undated) DEVICE — (D)STRIP SKN CLSR 0.5X4IN WHT --

## (undated) DEVICE — TOTAL JOINT - SMH: Brand: MEDLINE INDUSTRIES, INC.

## (undated) DEVICE — INTENDED FOR TISSUE SEPARATION, AND OTHER PROCEDURES THAT REQUIRE A SHARP SURGICAL BLADE TO PUNCTURE OR CUT.: Brand: BARD-PARKER ® CARBON RIB-BACK BLADES

## (undated) DEVICE — PREP SKN PREVAIL 40ML APPL --

## (undated) DEVICE — SUTURE VCRL SZ 3-0 L27IN ABSRB UD FS-2 L19MM 1/2 CIR J423H

## (undated) DEVICE — SYR 10ML LUER LOK 1/5ML GRAD --

## (undated) DEVICE — INFECTION CONTROL KIT SYS

## (undated) DEVICE — SUT PROL 7-0 24IN BV1 DA BLU --

## (undated) DEVICE — CROUCH CORNEAL PROTECTOR: Brand: BAUSCH + LOMB

## (undated) DEVICE — DRAPE,EXTREMITY,89X128,STERILE: Brand: MEDLINE

## (undated) DEVICE — HANDLE LT SNAP ON ULT DURABLE LENS FOR TRUMPF ALC DISPOSABLE

## (undated) DEVICE — KENDALL SCD EXPRESS SLEEVES, KNEE LENGTH, MEDIUM: Brand: KENDALL SCD

## (undated) DEVICE — 1200 GUARD II KIT W/5MM TUBE W/O VAC TUBE: Brand: GUARDIAN

## (undated) DEVICE — Z CONVERTED USE 2271116 TUBING ASPIR 9 FT STRL ULTRA-LIMP

## (undated) DEVICE — SOLIDIFIER FLUID 3000 CC ABSORB

## (undated) DEVICE — SYR LR LCK 1ML GRAD NSAF 30ML --

## (undated) DEVICE — Z CONVERTED USE 2274299 CUFF BLD PRESSURE LNG MED AD 25-35 CM ARM FLEXIPORT DISP

## (undated) DEVICE — SPONGE GZ W4XL4IN COT 12 PLY TYP VII WVN C FLD DSGN

## (undated) DEVICE — CANN NASAL O2 CAPNOGRAPHY AD -- FILTERLINE

## (undated) DEVICE — DYONICS 25 INFLOW/OUTFLOW TUBE                                    SET, SINGLE SUCTION, 3 PER BOX

## (undated) DEVICE — SUTURE VCRL SZ 2-0 L36IN ABSRB UD L40MM CT 1/2 CIR J957H

## (undated) DEVICE — GLOVE ORANGE PI 7   MSG9070

## (undated) DEVICE — MAT SUCT W36XL48IN FLD CTRL DISP

## (undated) DEVICE — BAG BELONG PT PERS CLEAR HANDL

## (undated) DEVICE — SUTURE ABSORBABLE MONOFILAMENT 6-0 G-1 18 IN CHROMIC GUT UD 796G

## (undated) DEVICE — CATH IV AUTOGRD BC BLU 22GA 25 -- INSYTE

## (undated) DEVICE — CUSTOM CAST PD STR

## (undated) DEVICE — (D)SYR 10ML 1/5ML GRAD NSAF -- PKGING CHANGE USE ITEM 338027

## (undated) DEVICE — NEEDLE HYPO 18GA L1.5IN PNK S STL HUB POLYPR SHLD REG BVL

## (undated) DEVICE — CONTAINER SPEC 20 ML LID NEUT BUFF FORMALIN 10 % POLYPR STS

## (undated) DEVICE — MICRODISSECTION NEEDLE STRAIGHT SLEEVE: Brand: COLORADO

## (undated) DEVICE — Device

## (undated) DEVICE — HANDPIECE SET WITH BONE CLEANING TIP AND SUCTION TUBE: Brand: INTERPULSE

## (undated) DEVICE — SLIM BODY SKIN STAPLER: Brand: APPOSE ULC

## (undated) DEVICE — SYRINGE MED L0.5IN OD30GA 1ML LL W/ SFTY PIVOTING SHLD

## (undated) DEVICE — PACK,LITHOTOMY,PK I: Brand: MEDLINE

## (undated) DEVICE — TOTAL TRAY, 16FR 10ML SIL FOLEY, URN: Brand: MEDLINE

## (undated) DEVICE — KENDALL RADIOLUCENT FOAM MONITORING ELECTRODE -RECTANGULAR SHAPE: Brand: KENDALL

## (undated) DEVICE — HEADLESS TROCHAR PIN 75MM: Brand: ZUK

## (undated) DEVICE — YANKAUER,FLEXIBLE HANDLE,REGLR CAPACITY: Brand: MEDLINE INDUSTRIES, INC.

## (undated) DEVICE — GLOVE SURG SZ 7 CRM LTX FREE POLYISOPRENE POLYMER BEAD ANTI

## (undated) DEVICE — NEEDLE ENDOSCP 1ML SYR CA HA SIDEKCK RIG INJ COAPTITE

## (undated) DEVICE — SUT SLK 6-0 18IN G1 DA BLK --

## (undated) DEVICE — CARTRIDGE BNE CEM MIX UNIV TWR VAC ROTOR BRK OFF NOZ W/O

## (undated) DEVICE — ROCKER SWITCH PENCIL BLADE ELECTRODE, HOLSTER: Brand: EDGE

## (undated) DEVICE — NEEDLE HYPO 22GA L1.5IN BLK POLYPR HUB S STL REG BVL STR

## (undated) DEVICE — SOLUTION IRRIGATION H2O 0797305] ICU MEDICAL INC]

## (undated) DEVICE — SPONGE LAP 18X18IN STRL -- 5/PK

## (undated) DEVICE — TRAY CATH OD16FR SIL URIN M STATLOK STBL DEV SURSTP

## (undated) DEVICE — Z INACTIVE NO USAGE TURNOVER KIT RM CLEANOP

## (undated) DEVICE — SUT ETHLN 6-0 18IN P3 BLK --

## (undated) DEVICE — BW-412T DISP COMBO CLEANING BRUSH: Brand: SINGLE USE COMBINATION CLEANING BRUSH

## (undated) DEVICE — BAG SPEC BIOHZD LF 2MIL 6X10IN -- CONVERT TO ITEM 357326

## (undated) DEVICE — TUBING HYDR IRR --

## (undated) DEVICE — SOLUTION IRRIG 1000ML H2O STRL BLT

## (undated) DEVICE — Z INACTIVE USE 2273136 JELLY EXAM LUBRICATING 5GM PKT TB ST K-Y E-Z LUBE

## (undated) DEVICE — COVER,MAYO STAND,STERILE: Brand: MEDLINE

## (undated) DEVICE — STRYKER PERFORMANCE SERIES SAGITTAL BLADE: Brand: STRYKER PERFORMANCE SERIES

## (undated) DEVICE — FORCEPS BX L240CM JAW DIA2.8MM L CAP W/ NDL MIC MESH TOOTH

## (undated) DEVICE — SOLUTION IRRIG 1000ML STRL H2O USP PLAS POUR BTL

## (undated) DEVICE — SYRINGE MED 20ML STD CLR PLAS LUERLOCK TIP N CTRL DISP

## (undated) DEVICE — SUTURE PERMAHAND SZ 6-0 L18IN NONABSORBABLE BLK L11MM P-1 639G

## (undated) DEVICE — SOLUTION IRRIG 3000ML 0.9% SOD CHL USP UROMATIC PLAS CONT

## (undated) DEVICE — GOWN,PREVENTION PLUS,XLN/XL,ST,24/CS: Brand: MEDLINE

## (undated) DEVICE — GLOVE SURG SZ 65 CRM LTX FREE POLYISOPRENE POLYMER BEAD ANTI

## (undated) DEVICE — SWAB SURG L76CM COT ROUNDED PT TIP VISISWAB

## (undated) DEVICE — NDL PRT INJ NSAF BLNT 18GX1.5 --

## (undated) DEVICE — STRIP SKIN CLSR 1/4INX1.5IN REINF WND NYL CURAD

## (undated) DEVICE — 3000CC GUARDIAN II: Brand: GUARDIAN

## (undated) DEVICE — SUT ETHLN 4-0 18IN PS2 BLK --

## (undated) DEVICE — PACK,ORTOHMAX/CVMAX,UNIVERSAL,5/CS: Brand: MEDLINE

## (undated) DEVICE — CONTAINER,SPECIMEN,3OZ,OR STRL: Brand: MEDLINE

## (undated) DEVICE — TRAY CATH SIL 16FR 10 CA STATLOK

## (undated) DEVICE — 4.5 MM INCISOR PLUS STRAIGHT                                    BLADES, POWER/EP-1, VIOLET, PACKAGED                                    6 PER BOX, STERILE

## (undated) DEVICE — STRAP RESTRAIN W3.5XL19IN TECLIN STRRP POS LEG DURING LITH

## (undated) DEVICE — SET ADMIN 16ML TBNG L100IN 2 Y INJ SITE IV PIGGY BK DISP

## (undated) DEVICE — SOLUTION IRRIG 3000ML LAC R FLX CONT

## (undated) DEVICE — ENDO CARRY-ON PROCEDURE KIT INCLUDES ENZYMATIC SPONGE, GAUZE, BIOHAZARD LABEL, TRAY, LUBRICANT, DIRTY SCOPE LABEL, WATER LABEL, TRAY, DRAWSTRING PAD, AND DEFENDO 4-PIECE KIT.: Brand: ENDO CARRY-ON PROCEDURE KIT

## (undated) DEVICE — DRESSING HYDROCOLLOID BORDER 35X10 IN ALUM PRIMASEAL

## (undated) DEVICE — SUT MERS 3-0 18IN FS1 WHT --

## (undated) DEVICE — GLOVE SURG SZ 8 L12IN FNGR THK94MIL STD WHT LTX FREE

## (undated) DEVICE — STRAP POS KNEE BODY VELC

## (undated) DEVICE — SKIN TEMPERATURE SENSOR: Brand: DEROYAL

## (undated) DEVICE — TUBING SUCT L9FT FOR AUTOFUSE INFLTR SYS

## (undated) DEVICE — WRAP SURG W1.31XL1.34M CARD FOR PT 165-172CM THERMOWRP

## (undated) DEVICE — CATHETER IV 22GA L1IN TEF FEP STR HUB INTROCAN SFTY

## (undated) DEVICE — OCCLUSIVE GAUZE STRIP,3% BISMUTH TRIBROMOPHENATE IN PETROLATUM BLEND: Brand: XEROFORM

## (undated) DEVICE — CYSTO/BLADDER IRRIGATION SET, REGULATING CLAMP

## (undated) DEVICE — BITE BLK ENDOSCP AD 54FR GRN POLYETH ENDOSCP W STRP SLD

## (undated) DEVICE — Z DISCONTINUED NO SUB IDED SET EXTN W/ 4 W STPCOCK M SPIN LOK 36IN

## (undated) DEVICE — Device: Brand: MEDICAL ACTION INDUSTRIES

## (undated) DEVICE — SKIN MARKER,REGULAR TIP WITH RULER AND LABELS: Brand: DEVON

## (undated) DEVICE — Z INACTIVE USE 2527070 DRAPE SURG W40XL44IN UNDERBUTTOCK SMS POLYPR W/ PCH BK DISP